# Patient Record
Sex: MALE | Race: BLACK OR AFRICAN AMERICAN | Employment: FULL TIME | URBAN - METROPOLITAN AREA
[De-identification: names, ages, dates, MRNs, and addresses within clinical notes are randomized per-mention and may not be internally consistent; named-entity substitution may affect disease eponyms.]

---

## 2017-03-16 ENCOUNTER — OFFICE VISIT (OUTPATIENT)
Dept: FAMILY MEDICINE CLINIC | Facility: CLINIC | Age: 52
End: 2017-03-16

## 2017-03-16 VITALS
HEART RATE: 73 BPM | HEIGHT: 72 IN | DIASTOLIC BLOOD PRESSURE: 80 MMHG | SYSTOLIC BLOOD PRESSURE: 135 MMHG | TEMPERATURE: 98 F | RESPIRATION RATE: 17 BRPM

## 2017-03-16 DIAGNOSIS — S06.0X0D MILD CONCUSSION, WITHOUT LOSS OF CONSCIOUSNESS, SUBSEQUENT ENCOUNTER: ICD-10-CM

## 2017-03-16 DIAGNOSIS — S16.1XXS CERVICAL STRAIN, SEQUELA: ICD-10-CM

## 2017-03-16 DIAGNOSIS — M99.01 CERVICOTHORACIC SOMATIC DYSFUNCTION: ICD-10-CM

## 2017-03-16 DIAGNOSIS — S13.4XXD WHIPLASH INJURY SYNDROME, SUBSEQUENT ENCOUNTER: Primary | ICD-10-CM

## 2017-03-16 PROCEDURE — 98927 OSTEOPATH MANJ 5-6 REGIONS: CPT | Performed by: FAMILY MEDICINE

## 2017-03-16 PROCEDURE — 99213 OFFICE O/P EST LOW 20 MIN: CPT | Performed by: FAMILY MEDICINE

## 2017-03-23 ENCOUNTER — OFFICE VISIT (OUTPATIENT)
Dept: FAMILY MEDICINE CLINIC | Facility: CLINIC | Age: 52
End: 2017-03-23

## 2017-03-23 VITALS
HEIGHT: 72 IN | SYSTOLIC BLOOD PRESSURE: 116 MMHG | RESPIRATION RATE: 16 BRPM | BODY MASS INDEX: 29.53 KG/M2 | WEIGHT: 218 LBS | HEART RATE: 81 BPM | DIASTOLIC BLOOD PRESSURE: 68 MMHG

## 2017-03-23 DIAGNOSIS — M99.01 CERVICOTHORACIC SOMATIC DYSFUNCTION: Primary | ICD-10-CM

## 2017-03-23 DIAGNOSIS — S13.4XXD WHIPLASH INJURY SYNDROME, SUBSEQUENT ENCOUNTER: ICD-10-CM

## 2017-03-23 PROCEDURE — 99214 OFFICE O/P EST MOD 30 MIN: CPT | Performed by: FAMILY MEDICINE

## 2017-03-23 PROCEDURE — 98927 OSTEOPATH MANJ 5-6 REGIONS: CPT | Performed by: FAMILY MEDICINE
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
## 2017-03-31 ENCOUNTER — OFFICE VISIT (OUTPATIENT)
Dept: PHYSICAL THERAPY | Facility: HOSPITAL | Age: 52
End: 2017-03-31
Attending: FAMILY MEDICINE
Payer: COMMERCIAL

## 2017-03-31 DIAGNOSIS — S13.4XXD WHIPLASH INJURY SYNDROME, SUBSEQUENT ENCOUNTER: ICD-10-CM

## 2017-03-31 DIAGNOSIS — M99.01 CERVICOTHORACIC SOMATIC DYSFUNCTION: Primary | ICD-10-CM

## 2017-03-31 PROCEDURE — 97163 PT EVAL HIGH COMPLEX 45 MIN: CPT
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
## 2017-04-03 ENCOUNTER — OFFICE VISIT (OUTPATIENT)
Dept: PHYSICAL THERAPY | Facility: HOSPITAL | Age: 52
End: 2017-04-03
Attending: FAMILY MEDICINE
Payer: COMMERCIAL

## 2017-04-03 PROCEDURE — 97110 THERAPEUTIC EXERCISES: CPT
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
## 2017-04-06 ENCOUNTER — APPOINTMENT (OUTPATIENT)
Dept: PHYSICAL THERAPY | Facility: HOSPITAL | Age: 52
End: 2017-04-06
Attending: FAMILY MEDICINE
Payer: COMMERCIAL

## 2017-04-11 ENCOUNTER — APPOINTMENT (OUTPATIENT)
Dept: PHYSICAL THERAPY | Facility: HOSPITAL | Age: 52
End: 2017-04-11
Attending: FAMILY MEDICINE
Payer: COMMERCIAL

## 2017-04-14 ENCOUNTER — APPOINTMENT (OUTPATIENT)
Dept: PHYSICAL THERAPY | Facility: HOSPITAL | Age: 52
End: 2017-04-14
Attending: FAMILY MEDICINE
Payer: COMMERCIAL

## 2017-04-18 ENCOUNTER — APPOINTMENT (OUTPATIENT)
Dept: PHYSICAL THERAPY | Facility: HOSPITAL | Age: 52
End: 2017-04-18
Attending: FAMILY MEDICINE
Payer: COMMERCIAL

## 2017-04-21 ENCOUNTER — OFFICE VISIT (OUTPATIENT)
Dept: PHYSICAL THERAPY | Facility: HOSPITAL | Age: 52
End: 2017-04-21
Attending: FAMILY MEDICINE
Payer: COMMERCIAL

## 2017-04-21 PROCEDURE — 97110 THERAPEUTIC EXERCISES: CPT
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
## 2017-07-20 ENCOUNTER — OFFICE VISIT (OUTPATIENT)
Dept: FAMILY MEDICINE CLINIC | Facility: CLINIC | Age: 52
End: 2017-07-20

## 2017-07-20 VITALS
RESPIRATION RATE: 16 BRPM | TEMPERATURE: 97 F | WEIGHT: 225 LBS | HEIGHT: 72 IN | HEART RATE: 67 BPM | BODY MASS INDEX: 30.48 KG/M2 | SYSTOLIC BLOOD PRESSURE: 110 MMHG | DIASTOLIC BLOOD PRESSURE: 70 MMHG

## 2017-07-20 DIAGNOSIS — Z02.89 EXAMINATION, PHYSICAL, EMPLOYEE: Primary | ICD-10-CM

## 2017-07-20 PROBLEM — Z00.00 PHYSICAL EXAM: Status: ACTIVE | Noted: 2017-07-20

## 2017-07-20 PROCEDURE — 99213 OFFICE O/P EST LOW 20 MIN: CPT | Performed by: FAMILY MEDICINE

## 2017-07-20 PROCEDURE — 99212 OFFICE O/P EST SF 10 MIN: CPT | Performed by: FAMILY MEDICINE

## 2017-07-20 NOTE — PROGRESS NOTES
HPI:    Patient ID: Delma Banks is a 49year old male. 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 No acute or chronic complaints. Bowel, bladder functions intact.          Review of Systems         Current Outpat heard. Carotid bruit not present. Pulmonary/Chest: Effort normal and breath sounds normal. No respiratory distress. Abdominal: Soft. Bowel sounds are normal.   Neurological: He is alert and oriented to person, place, and time.  No cranial nerve deficit, s

## 2017-08-14 ENCOUNTER — TELEPHONE (OUTPATIENT)
Dept: FAMILY MEDICINE CLINIC | Facility: CLINIC | Age: 52
End: 2017-08-14

## 2018-01-19 ENCOUNTER — OFFICE VISIT (OUTPATIENT)
Dept: FAMILY MEDICINE CLINIC | Facility: CLINIC | Age: 53
End: 2018-01-19

## 2018-01-19 VITALS
HEIGHT: 72 IN | DIASTOLIC BLOOD PRESSURE: 76 MMHG | HEART RATE: 78 BPM | TEMPERATURE: 99 F | SYSTOLIC BLOOD PRESSURE: 118 MMHG | RESPIRATION RATE: 16 BRPM

## 2018-01-19 DIAGNOSIS — L28.0 LICHEN SIMPLEX CHRONICUS: Primary | ICD-10-CM

## 2018-01-19 PROCEDURE — 99213 OFFICE O/P EST LOW 20 MIN: CPT | Performed by: FAMILY MEDICINE

## 2018-01-19 PROCEDURE — 99212 OFFICE O/P EST SF 10 MIN: CPT | Performed by: FAMILY MEDICINE

## 2018-01-19 NOTE — PROGRESS NOTES
HPI:    Patient ID: Ashish Green is a 47year old male. Rash   This is a new problem. The current episode started 1 to 4 weeks ago (2 weeks). The problem has been gradually worsening since onset.  The affected locations include the right hand, right Physical Exam    Constitutional: He appears well-developed and well-nourished. No distress. Cardiovascular: Normal rate and regular rhythm.     Pulmonary/Chest: Effort normal and breath sounds normal.   Skin:        Lichen simplex lesions as depicted on b

## 2018-07-26 ENCOUNTER — OFFICE VISIT (OUTPATIENT)
Dept: FAMILY MEDICINE CLINIC | Facility: CLINIC | Age: 53
End: 2018-07-26

## 2018-07-26 VITALS
SYSTOLIC BLOOD PRESSURE: 115 MMHG | HEART RATE: 64 BPM | RESPIRATION RATE: 17 BRPM | TEMPERATURE: 97 F | DIASTOLIC BLOOD PRESSURE: 78 MMHG | WEIGHT: 220 LBS | HEIGHT: 72 IN | BODY MASS INDEX: 29.8 KG/M2

## 2018-07-26 DIAGNOSIS — Z02.89 PHYSICAL EXAMINATION OF EMPLOYEE: Primary | ICD-10-CM

## 2018-07-26 PROCEDURE — 99212 OFFICE O/P EST SF 10 MIN: CPT | Performed by: FAMILY MEDICINE

## 2018-07-26 PROCEDURE — 99213 OFFICE O/P EST LOW 20 MIN: CPT | Performed by: FAMILY MEDICINE

## 2018-07-26 NOTE — PATIENT INSTRUCTIONS
Encouraged physical fitness and daily physical activity daily (PLAY). Recommend weight loss via daily exercising and consistent healthy dietary changes. Monitor blood pressures and record at home. Limit salt intake.

## 2018-07-26 NOTE — PROGRESS NOTES
HPI:    Patient ID: Verner Carpenter is a 47year old male. 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 No acute concerns. All pre-existing medical issues are all stable.          Review of Systems         Current Outpatient Pres Cardiovascular: Normal rate, regular rhythm and normal heart sounds. Pulmonary/Chest: Effort normal and breath sounds normal. No respiratory distress. Abdominal: Soft. Bowel sounds are normal. He exhibits no distension.    Neurological: He is alert a

## 2018-08-13 ENCOUNTER — TELEPHONE (OUTPATIENT)
Dept: FAMILY MEDICINE CLINIC | Facility: CLINIC | Age: 53
End: 2018-08-13

## 2018-08-13 ENCOUNTER — OFFICE VISIT (OUTPATIENT)
Dept: FAMILY MEDICINE CLINIC | Facility: CLINIC | Age: 53
End: 2018-08-13

## 2018-08-13 VITALS
RESPIRATION RATE: 17 BRPM | SYSTOLIC BLOOD PRESSURE: 117 MMHG | TEMPERATURE: 97 F | HEIGHT: 72 IN | HEART RATE: 63 BPM | DIASTOLIC BLOOD PRESSURE: 74 MMHG

## 2018-08-13 DIAGNOSIS — T14.8XXA SPLINTER: ICD-10-CM

## 2018-08-13 DIAGNOSIS — Z86.711 PERSONAL HISTORY OF PE (PULMONARY EMBOLISM): Primary | ICD-10-CM

## 2018-08-13 PROCEDURE — 99212 OFFICE O/P EST SF 10 MIN: CPT | Performed by: FAMILY MEDICINE

## 2018-08-13 PROCEDURE — 99213 OFFICE O/P EST LOW 20 MIN: CPT | Performed by: FAMILY MEDICINE

## 2018-08-13 RX ORDER — POLYMYXIN B SULFATE AND TRIMETHOPRIM 1; 10000 MG/ML; [USP'U]/ML
SOLUTION OPHTHALMIC
Refills: 0 | COMMUNITY
Start: 2018-07-08 | End: 2018-08-13 | Stop reason: ALTCHOICE

## 2018-08-13 NOTE — TELEPHONE ENCOUNTER
Tavo Linda had an appointment today with Dr. Zion Acevedo, he left FMLA forms for completion. FMLA forms, signed HIPAA form and payment form emailed to [email or web address], original forms left in the [employer] office for [employer]. Tavo Linda would like to  the forms when completed [facility].

## 2018-08-13 NOTE — PROGRESS NOTES
HPI:    Patient ID: Derrill Cabot is a 47year old male. 47year old AA male also with splint in left hand third digit which needs removal.  FMLA needs to be updated regarding pulmonary embolism history. Asymptomatic today.           Review of System Cardiovascular: Normal rate, regular rhythm and normal heart sounds. Pulmonary/Chest: Effort normal and breath sounds normal. No respiratory distress.    Musculoskeletal:        Hands:  Wood splint as depicted   Neurological: He is alert and oriented to

## 2018-08-13 NOTE — PATIENT INSTRUCTIONS
FMLA to be updated. Status unchanged. Keep specialty appointment as needed. Splinter removed manually after sterile prep with 11 blade without anesthesia.

## 2018-08-14 NOTE — TELEPHONE ENCOUNTER
Form completed signed by Dr. Ryan Blandon. Orig ready to  at  OP, pt informed and already faxed to  BROOK TAFOYA

## 2019-01-25 ENCOUNTER — TELEPHONE (OUTPATIENT)
Dept: OTHER | Age: 54
End: 2019-01-25

## 2019-01-25 NOTE — TELEPHONE ENCOUNTER
Action Requested: Summary for Provider     []  Critical Lab, Recommendations Needed  [] Need Additional Advice  []   FYI    []   Need Orders  [] Need Medications Sent to Pharmacy  []  Other     SUMMARY: OV scheduled for 1/25/19 lower back pain    Pt states

## 2019-01-29 ENCOUNTER — OFFICE VISIT (OUTPATIENT)
Dept: FAMILY MEDICINE CLINIC | Facility: CLINIC | Age: 54
End: 2019-01-29

## 2019-01-29 VITALS
HEART RATE: 60 BPM | WEIGHT: 240 LBS | RESPIRATION RATE: 16 BRPM | HEIGHT: 72 IN | BODY MASS INDEX: 32.51 KG/M2 | SYSTOLIC BLOOD PRESSURE: 120 MMHG | DIASTOLIC BLOOD PRESSURE: 81 MMHG

## 2019-01-29 DIAGNOSIS — M99.03 SOMATIC DYSFUNCTION OF LUMBOSACRAL REGION: ICD-10-CM

## 2019-01-29 DIAGNOSIS — R06.83 SNORING: ICD-10-CM

## 2019-01-29 DIAGNOSIS — M99.02 THORACIC REGION SOMATIC DYSFUNCTION: Primary | ICD-10-CM

## 2019-01-29 DIAGNOSIS — R29.818 SUSPECTED SLEEP APNEA: ICD-10-CM

## 2019-01-29 PROCEDURE — 98926 OSTEOPATH MANJ 3-4 REGIONS: CPT | Performed by: FAMILY MEDICINE

## 2019-01-29 PROCEDURE — 99212 OFFICE O/P EST SF 10 MIN: CPT | Performed by: FAMILY MEDICINE

## 2019-01-29 PROCEDURE — 99214 OFFICE O/P EST MOD 30 MIN: CPT | Performed by: FAMILY MEDICINE

## 2019-01-29 NOTE — PATIENT INSTRUCTIONS
Sleep study recommended for the patient. OMT done. Monitor blood pressures and record at home. Limit salt intake. Recommend weight loss via daily exercising and consistent healthy dietary changes.   We will get a lumbar x-ray if conservative manipulation

## 2019-01-29 NOTE — PROGRESS NOTES
HPI:    Patient ID: Lauren Mccollum is a 47year old male. Low Back Pain   This is a new problem. The current episode started 1 to 4 weeks ago. The problem occurs constantly. The problem is unchanged. The pain is present in the lumbar spine.  The qualit soft palate hanging low and obstructive. Cardiovascular: Normal rate, regular rhythm and normal heart sounds. Pulmonary/Chest: Effort normal and breath sounds normal. No respiratory distress.    Musculoskeletal:        Cervical back: He exhibits decr

## 2019-01-29 NOTE — PROCEDURES
Multiple vertebral segments in lumbosacral and thoracic region misaligned. Manual osteopathic manipulative therapy performed and immediate relief obtained. Patient instantaneously improved.

## 2019-03-28 ENCOUNTER — TELEPHONE (OUTPATIENT)
Dept: GASTROENTEROLOGY | Facility: CLINIC | Age: 54
End: 2019-03-28

## 2019-03-28 NOTE — TELEPHONE ENCOUNTER
----- Message from Thierno Valle RN sent at 4/29/2016  4:52 PM CDT -----  Regarding: recall colon  Recall colon in 3 years per PL.  Colon done 4/28/16

## 2019-04-24 ENCOUNTER — NURSE TRIAGE (OUTPATIENT)
Dept: FAMILY MEDICINE CLINIC | Facility: CLINIC | Age: 54
End: 2019-04-24

## 2019-04-24 NOTE — TELEPHONE ENCOUNTER
Pt called in requesting to come in after 1pm for off and on knee pain. Offered pt the first available appt with [facility] for 5/6 pt declined. Pt declines seeing another physician sooner.    Please advise

## 2019-04-26 NOTE — TELEPHONE ENCOUNTER
Action Requested: Summary for Provider     []  Critical Lab, Recommendations Needed  [] Need Additional Advice  []   FYI    []   Need Orders  [] Need Medications Sent to Pharmacy  []  Other     SUMMARY:   Appointment made for tomorrow 4/26/19    The patien

## 2019-05-14 ENCOUNTER — LAB ENCOUNTER (OUTPATIENT)
Dept: LAB | Age: 54
End: 2019-05-14
Attending: FAMILY MEDICINE
Payer: COMMERCIAL

## 2019-05-14 ENCOUNTER — OFFICE VISIT (OUTPATIENT)
Dept: FAMILY MEDICINE CLINIC | Facility: CLINIC | Age: 54
End: 2019-05-14

## 2019-05-14 VITALS
SYSTOLIC BLOOD PRESSURE: 117 MMHG | BODY MASS INDEX: 31.83 KG/M2 | HEIGHT: 72 IN | DIASTOLIC BLOOD PRESSURE: 75 MMHG | WEIGHT: 235 LBS | RESPIRATION RATE: 16 BRPM | HEART RATE: 76 BPM | TEMPERATURE: 98 F

## 2019-05-14 DIAGNOSIS — Z13.220 LIPID SCREENING: ICD-10-CM

## 2019-05-14 DIAGNOSIS — Z12.11 COLON CANCER SCREENING: ICD-10-CM

## 2019-05-14 DIAGNOSIS — Z00.00 ROUTINE PHYSICAL EXAMINATION: ICD-10-CM

## 2019-05-14 DIAGNOSIS — Z12.5 PROSTATE CANCER SCREENING: ICD-10-CM

## 2019-05-14 DIAGNOSIS — Z00.00 ROUTINE PHYSICAL EXAMINATION: Primary | ICD-10-CM

## 2019-05-14 DIAGNOSIS — Z13.29 THYROID DISORDER SCREEN: ICD-10-CM

## 2019-05-14 PROCEDURE — 99396 PREV VISIT EST AGE 40-64: CPT | Performed by: FAMILY MEDICINE

## 2019-05-14 PROCEDURE — 36415 COLL VENOUS BLD VENIPUNCTURE: CPT

## 2019-05-14 PROCEDURE — 81003 URINALYSIS AUTO W/O SCOPE: CPT

## 2019-05-14 PROCEDURE — 80053 COMPREHEN METABOLIC PANEL: CPT

## 2019-05-14 PROCEDURE — 85025 COMPLETE CBC W/AUTO DIFF WBC: CPT

## 2019-05-14 PROCEDURE — 93005 ELECTROCARDIOGRAM TRACING: CPT | Performed by: FAMILY MEDICINE

## 2019-05-14 PROCEDURE — 80061 LIPID PANEL: CPT

## 2019-05-14 PROCEDURE — 93000 ELECTROCARDIOGRAM COMPLETE: CPT | Performed by: FAMILY MEDICINE

## 2019-05-14 PROCEDURE — 84443 ASSAY THYROID STIM HORMONE: CPT

## 2019-05-14 NOTE — PROGRESS NOTES
HPI:    Patient ID: Verner Carpenter is a 47year old male.     Patient is a 56-year-old -American male here for complete preventive care physical and for status update on any confirmed chronic medical illnesses and follow up on any previous labs or sinus rate and rhythm. No Q waves. No ST-T wave changes. Normal EKG. - COMP METABOLIC PANEL (14); Future    2. Lipid screening  Screened  - LIPID PANEL; Future    3. Thyroid disorder screen  Screened  - TSH W REFLEX TO FREE T4; Future    4.  Prostate canc

## 2019-05-14 NOTE — PATIENT INSTRUCTIONS
All adult screening ordered and done appropriate for patient's age and gender and risk factors and complaints. Recommend weight loss via daily exercising and consistent healthy dietary changes. Monitor blood pressures and record at home.  Limit salt intak

## 2019-05-16 ENCOUNTER — OFFICE VISIT (OUTPATIENT)
Dept: FAMILY MEDICINE CLINIC | Facility: CLINIC | Age: 54
End: 2019-05-16

## 2019-05-16 VITALS
TEMPERATURE: 98 F | HEART RATE: 81 BPM | DIASTOLIC BLOOD PRESSURE: 79 MMHG | RESPIRATION RATE: 16 BRPM | SYSTOLIC BLOOD PRESSURE: 112 MMHG

## 2019-05-16 DIAGNOSIS — Z57.9 OCCUPATIONAL EXPOSURE IN WORKPLACE: Primary | ICD-10-CM

## 2019-05-16 PROCEDURE — 99213 OFFICE O/P EST LOW 20 MIN: CPT | Performed by: FAMILY MEDICINE

## 2019-05-16 PROCEDURE — 99212 OFFICE O/P EST SF 10 MIN: CPT | Performed by: FAMILY MEDICINE

## 2019-05-16 SDOH — HEALTH STABILITY - PHYSICAL HEALTH: OCCUPATIONAL EXPOSURE TO UNSPECIFIED RISK FACTOR: Z57.9
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
## 2019-05-20 ENCOUNTER — TELEPHONE (OUTPATIENT)
Dept: GASTROENTEROLOGY | Facility: CLINIC | Age: 54
End: 2019-05-20

## 2019-05-20 NOTE — TELEPHONE ENCOUNTER
Forwarded to Nelly TILLMAN. Pt contacted, meds/allergies reviewed. This is Dr Bro Rodriguez pt--placed colon/path report on your desk. Thanks.      Last Procedure:  Colon screening 4/28/16  Last Diagnosis:  Found 4 polyps  Recalled for (years): 3 yrs, due 4/19  Kamala

## 2019-05-20 NOTE — TELEPHONE ENCOUNTER
The patient's chart has been reviewed. Okay to schedule pt for 3 year CLN recall r/t screening for CLN CA/hx colon polyps with Dr. Byron Parmar. Advise MAC sedation due to BMI >30 with split dose Colyte or equivalent (eRx) preparation.     -Please note:  It is

## 2019-05-21 NOTE — TELEPHONE ENCOUNTER
CB, unable to LM to schedule procedure, VM box full. If pt returns call, please transfer to Gaby Pond at ext [phone] or [phone] for scheduling. Or please transfer to Maxine Real in GI if unavailable.

## 2019-05-23 ENCOUNTER — TELEPHONE (OUTPATIENT)
Dept: FAMILY MEDICINE CLINIC | Facility: CLINIC | Age: 54
End: 2019-05-23

## 2019-05-23 NOTE — TELEPHONE ENCOUNTER
Richardson Lowry would like May 14th.notes faxed over to [phone]. Claim # [id number].  Please advsed

## 2019-05-29 ENCOUNTER — OFFICE VISIT (OUTPATIENT)
Dept: SLEEP CENTER | Age: 54
End: 2019-05-29
Attending: FAMILY MEDICINE
Payer: COMMERCIAL

## 2019-05-29 DIAGNOSIS — Z76.89 SLEEP CONCERN: Primary | ICD-10-CM

## 2019-05-29 DIAGNOSIS — R29.818 SUSPECTED SLEEP APNEA: ICD-10-CM

## 2019-05-29 PROCEDURE — 95810 POLYSOM 6/> YRS 4/> PARAM: CPT

## 2019-06-01 NOTE — PROCEDURES
[address] [facility]  Accredited by the [place] of Sleep Medicine (AASM)    PATIENT'S NAME: Kwadwo Seller   ATTENDING PHYSICIAN: Tushar Garcia DO   REFERRING PHYSICIAN: Tushar Garcia DO   PATIENT ACCOUNT #: [de-identified] LOCATION: S index is 20.5 events per hour, for a combined arousal index of 35.9 events per hour.   There were 11 periodic limb movements, for a periodic limb movement index of 2 per hour, of which 1 per hour were associated with arousal.  Lowest desaturation was to 85%

## 2019-06-06 DIAGNOSIS — G47.33 OSA (OBSTRUCTIVE SLEEP APNEA): Primary | ICD-10-CM

## 2019-06-07 ENCOUNTER — OFFICE VISIT (OUTPATIENT)
Dept: FAMILY MEDICINE CLINIC | Facility: CLINIC | Age: 54
End: 2019-06-07

## 2019-06-07 VITALS
RESPIRATION RATE: 17 BRPM | DIASTOLIC BLOOD PRESSURE: 75 MMHG | HEART RATE: 75 BPM | WEIGHT: 235 LBS | SYSTOLIC BLOOD PRESSURE: 115 MMHG | BODY MASS INDEX: 31.83 KG/M2 | HEIGHT: 72 IN

## 2019-06-07 DIAGNOSIS — Z57.9 OCCUPATIONAL EXPOSURE IN WORKPLACE: Primary | ICD-10-CM

## 2019-06-07 PROCEDURE — 99212 OFFICE O/P EST SF 10 MIN: CPT | Performed by: FAMILY MEDICINE

## 2019-06-07 PROCEDURE — 99213 OFFICE O/P EST LOW 20 MIN: CPT | Performed by: FAMILY MEDICINE

## 2019-06-07 SDOH — HEALTH STABILITY - PHYSICAL HEALTH: OCCUPATIONAL EXPOSURE TO UNSPECIFIED RISK FACTOR: Z57.9

## 2019-06-07 NOTE — PATIENT INSTRUCTIONS
Return to work statement generated for patient. If this continues to be a sleep disturbing issue or any type of psychological components then we will recommend clinical psychologist or other counseling.

## 2019-06-07 NOTE — PROGRESS NOTES
HPI:    Patient ID: Anisha Lane is a 47year old male. This is a follow-up visit to the following. HPI: Dandy Lopez is a 47year old male who presents for occupational exposure. He works for [employer].  Pt was working yesterda of the defined types were placed in this encounter.       Meds This Visit:  Requested Prescriptions      No prescriptions requested or ordered in this encounter       Imaging & Referrals:  None  Patient Instructions   Return to work statement generated for

## 2019-06-12 ENCOUNTER — TELEPHONE (OUTPATIENT)
Dept: FAMILY MEDICINE CLINIC | Facility: CLINIC | Age: 54
End: 2019-06-12

## 2019-06-12 NOTE — TELEPHONE ENCOUNTER
Irish rucker [facility] workers comp department called stating they need LOV notes and return to work note for June 7th 2019

## 2019-07-01 ENCOUNTER — TELEPHONE (OUTPATIENT)
Dept: FAMILY MEDICINE CLINIC | Facility: CLINIC | Age: 54
End: 2019-07-01

## 2019-07-01 NOTE — TELEPHONE ENCOUNTER
Milana Champagne stopped in the [facility] office, dropped off an Attending Physician Statement to be completed. Form, signed HIPAA form and FCR emailed to [email or web address], original form left in the [facility] office for completion.  Please call patient when the

## 2019-07-02 NOTE — TELEPHONE ENCOUNTER
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 Logged for processing.  NK

## 2019-07-08 NOTE — TELEPHONE ENCOUNTER
[facility] and [facility]hands[facility] have been completed and are ready for pk up at [employer]  for pt to take to employer. Pt given office hours. No further action needed.

## 2019-08-01 ENCOUNTER — NURSE TRIAGE (OUTPATIENT)
Dept: FAMILY MEDICINE CLINIC | Facility: CLINIC | Age: 54
End: 2019-08-01

## 2019-08-01 NOTE — TELEPHONE ENCOUNTER
Action Requested: Summary for Provider     []  Critical Lab, Recommendations Needed  [] Need Additional Advice  [x]   FYI    []   Need Orders  [] Need Medications Sent to Pharmacy  []  Other     SUMMARY: Appt scheduled 8/19/19 6pm with Dr Devyn De Paz for symptoms

## 2019-08-02 ENCOUNTER — TELEPHONE (OUTPATIENT)
Dept: FAMILY MEDICINE CLINIC | Facility: CLINIC | Age: 54
End: 2019-08-02

## 2019-08-02 NOTE — TELEPHONE ENCOUNTER
Pt dropped off FMLA forms to be completed and pt will pk up when completed. Pt HIPAA still current, pt to be billed for forms fee.  Forms scanned to [employer] and placed in [employer] forms mailbox

## 2019-08-06 NOTE — TELEPHONE ENCOUNTER
Called and informed patient that [facility]) is ready for pk up at the [employer] .

## 2019-08-19 ENCOUNTER — OFFICE VISIT (OUTPATIENT)
Dept: FAMILY MEDICINE CLINIC | Facility: CLINIC | Age: 54
End: 2019-08-19

## 2019-08-19 VITALS
HEIGHT: 72 IN | HEART RATE: 77 BPM | RESPIRATION RATE: 17 BRPM | WEIGHT: 222 LBS | SYSTOLIC BLOOD PRESSURE: 125 MMHG | BODY MASS INDEX: 30.07 KG/M2 | TEMPERATURE: 98 F | DIASTOLIC BLOOD PRESSURE: 72 MMHG

## 2019-08-19 DIAGNOSIS — M99.02 THORACIC REGION SOMATIC DYSFUNCTION: ICD-10-CM

## 2019-08-19 DIAGNOSIS — Z12.11 COLON CANCER SCREENING: Primary | ICD-10-CM

## 2019-08-19 DIAGNOSIS — R19.4 CHANGE IN BOWEL HABIT: ICD-10-CM

## 2019-08-19 DIAGNOSIS — M99.03 SOMATIC DYSFUNCTION OF LUMBOSACRAL REGION: ICD-10-CM

## 2019-08-19 DIAGNOSIS — M54.50 ACUTE LEFT-SIDED LOW BACK PAIN WITHOUT SCIATICA: ICD-10-CM

## 2019-08-19 LAB
BACTERIA UR QL AUTO: NEGATIVE /HPF
BILIRUB UR QL: NEGATIVE
CLARITY UR: CLEAR
COLOR UR: YELLOW
GLUCOSE UR-MCNC: NEGATIVE MG/DL
HGB UR QL STRIP.AUTO: NEGATIVE
LEUKOCYTE ESTERASE UR QL STRIP.AUTO: NEGATIVE
NITRITE UR QL STRIP.AUTO: NEGATIVE
PH UR: 6 [PH] (ref 5–8)
PROT UR-MCNC: 30 MG/DL
RBC #/AREA URNS AUTO: <1 /HPF
SP GR UR STRIP: 1.03 (ref 1–1.03)
UROBILINOGEN UR STRIP-ACNC: 2
VIT C UR-MCNC: 40 MG/DL
WBC #/AREA URNS AUTO: <1 /HPF

## 2019-08-19 PROCEDURE — 99214 OFFICE O/P EST MOD 30 MIN: CPT | Performed by: FAMILY MEDICINE

## 2019-08-19 PROCEDURE — 98926 OSTEOPATH MANJ 3-4 REGIONS: CPT | Performed by: FAMILY MEDICINE

## 2019-08-19 RX ORDER — CYCLOBENZAPRINE HCL 5 MG
5 TABLET ORAL 3 TIMES DAILY PRN
Qty: 30 TABLET | Refills: 0 | Status: SHIPPED | OUTPATIENT
Start: 2019-08-19 | End: 2021-01-05

## 2019-08-19 NOTE — PATIENT INSTRUCTIONS
Patient referred to gastroenterologist for colonoscopy repeat. Continue oral pool therapy. Urine studies sent. Cyclobenzaprine 5 mg prescribed to be taken before bedtime for muscle relaxation as needed. OMT done.

## 2019-08-20 NOTE — PROGRESS NOTES
HPI:    Patient ID: Baudilio Feldman is a 47year old male. Patient is a 13-year-old -American male here with a concern about 2 weeks of low back pain focused more to the left side without any trauma to report.   The patient has been doing a lot o rate, regular rhythm and normal heart sounds. Pulmonary/Chest: Effort normal and breath sounds normal.   Abdominal: Soft. Bowel sounds are normal.   Neurological: He is alert and oriented to person, place, and time. ASSESSMENT/PLAN:   1.  Ac

## 2019-08-28 ENCOUNTER — TELEPHONE (OUTPATIENT)
Dept: OTHER | Age: 54
End: 2019-08-28

## 2019-08-28 NOTE — TELEPHONE ENCOUNTER
Advised patient of the result per Dr Peacock Sessions note: Urinalysis has some findings but non suggestive of UTI. Amira Richter Patient is asking if he should be concerned with the \"some findings\". Patient advised he drinks muscle protein milk shakes but not daily.  Just

## 2019-09-16 NOTE — H&P
[address] [facility]    Patient presen use:   mg daily    History, Medications, Allergies, ROS:      Past Medical History:   Diagnosis Date   • Sleep apnea, obstructive 09/2019    mild      Past Surgical History:   Procedure Laterality Date   • COLONOSCOPY     • SHOULDER SURG PROC Rosamond Barthel in no acute distress  HEENT: conjunctiva pink, the sclera appears anicteric, oropharynx clear, mucus membranes appear moist  CV: regular rate and rhythm, the extremities are warm and well perfused   Lung: effort normal and breath sounds normal, no respirat before procedure(s)   - NO herbal supplements or weight loss medications x 7 days prior to the procedure(s)    ** If MAC @ [facility] or IV twilight - continue all medications as prescribed      Colonoscopy consent: I have discussed the risks (including risk of d

## 2019-09-23 ENCOUNTER — OFFICE VISIT (OUTPATIENT)
Dept: GASTROENTEROLOGY | Facility: CLINIC | Age: 54
End: 2019-09-23

## 2019-09-23 ENCOUNTER — TELEPHONE (OUTPATIENT)
Dept: GASTROENTEROLOGY | Facility: CLINIC | Age: 54
End: 2019-09-23

## 2019-09-23 VITALS
BODY MASS INDEX: 29.66 KG/M2 | HEART RATE: 66 BPM | HEIGHT: 72 IN | DIASTOLIC BLOOD PRESSURE: 72 MMHG | WEIGHT: 219 LBS | SYSTOLIC BLOOD PRESSURE: 111 MMHG

## 2019-09-23 DIAGNOSIS — Z86.010 HISTORY OF COLON POLYPS: ICD-10-CM

## 2019-09-23 DIAGNOSIS — Z80.0 FAMILY HISTORY OF COLON CANCER: ICD-10-CM

## 2019-09-23 DIAGNOSIS — Z86.010 HISTORY OF COLON POLYPS: Primary | ICD-10-CM

## 2019-09-23 DIAGNOSIS — Z12.11 SCREENING FOR COLON CANCER: Primary | ICD-10-CM

## 2019-09-23 PROCEDURE — 99243 OFF/OP CNSLTJ NEW/EST LOW 30: CPT | Performed by: NURSE PRACTITIONER

## 2019-09-23 NOTE — TELEPHONE ENCOUNTER
Scheduled for:  Colonoscopy [id number]  Provider Name: Giuseppe Iqbal  Date:  11/20/19  Location: [facility]   Sedation:  Mac  Time:  1400 / Arrival 1300Pm  Prep: Split dose Colyte  Meds/Allergies Reconciled?:  Yes,Physican reviewed by Alfreda Otto  Diagnosis with cod

## 2019-09-23 NOTE — PATIENT INSTRUCTIONS
-Schedule colonoscopy w/ Dr. Chuck Rush with MAC  Dx:  Screening, hx colon polyps, FHCC   -Eligible for NE: No r/t medical history  -Prep: Split dose Colyte or equivalent  -Anti-platelets and anti-coagulants: ASA-continue as prescribed  -Diabetes meds: None    *

## 2019-10-02 ENCOUNTER — TELEPHONE (OUTPATIENT)
Dept: GASTROENTEROLOGY | Facility: CLINIC | Age: 54
End: 2019-10-02

## 2019-10-02 NOTE — TELEPHONE ENCOUNTER
Charito in [facility] was not in office. Sending back to [facility] to review Dr Caruso People note below so it can be forwarded to [facility]. Thanks. May close encounter when auth received and pt notified. Thanks.

## 2019-10-02 NOTE — TELEPHONE ENCOUNTER
Pt's ins is requesting a reason for a 3 yr recall regarding the colonoscopy. Per pt's ins, both family hx & hx of 2 colon polyps is recommended in 5 yrs. Please advise. Thank you.

## 2019-10-02 NOTE — TELEPHONE ENCOUNTER
Dr Larisa Leger, please see below message from Ean Armendariz, and refer to your 4/28/16 colonoscopy report/path. Please advise, thanks.

## 2019-10-02 NOTE — TELEPHONE ENCOUNTER
Chart reviewed - prior colon from 2016 mentions 4 colon polyps removed. Adenomatous x 1  Two were not retrieved and assumed to be adenomatous   One hyperplastic. Guidelines suggest that 3 or greater polyps would indicate 3 year recall.

## 2019-11-19 ENCOUNTER — TELEPHONE (OUTPATIENT)
Dept: GASTROENTEROLOGY | Facility: CLINIC | Age: 54
End: 2019-11-19

## 2019-11-19 NOTE — TELEPHONE ENCOUNTER
Pt was contacted to inquire about his question. Per patient when he called he thought he lost his prep instructions but eventually found it. RN asked if he had any questions regarding the instruction and he stated no.   States he just picked up his pr

## 2019-11-20 ENCOUNTER — ANESTHESIA EVENT (OUTPATIENT)
Dept: ENDOSCOPY | Facility: HOSPITAL | Age: 54
End: 2019-11-20
Payer: COMMERCIAL

## 2019-11-20 ENCOUNTER — HOSPITAL ENCOUNTER (OUTPATIENT)
Facility: HOSPITAL | Age: 54
Setting detail: HOSPITAL OUTPATIENT SURGERY
Discharge: HOME | End: 2019-11-20
Attending: INTERNAL MEDICINE | Admitting: INTERNAL MEDICINE
Payer: COMMERCIAL

## 2019-11-20 ENCOUNTER — ANESTHESIA (OUTPATIENT)
Dept: ENDOSCOPY | Facility: HOSPITAL | Age: 54
End: 2019-11-20
Payer: COMMERCIAL

## 2019-11-20 VITALS
SYSTOLIC BLOOD PRESSURE: 113 MMHG | HEART RATE: 62 BPM | RESPIRATION RATE: 16 BRPM | WEIGHT: 220 LBS | OXYGEN SATURATION: 100 % | BODY MASS INDEX: 29.8 KG/M2 | DIASTOLIC BLOOD PRESSURE: 87 MMHG | HEIGHT: 72 IN

## 2019-11-20 DIAGNOSIS — K63.5 POLYP OF DESCENDING COLON, UNSPECIFIED TYPE: Primary | ICD-10-CM

## 2019-11-20 DIAGNOSIS — Z86.010 HISTORY OF COLON POLYPS: ICD-10-CM

## 2019-11-20 DIAGNOSIS — Z80.0 FAMILY HISTORY OF COLON CANCER: ICD-10-CM

## 2019-11-20 DIAGNOSIS — K64.9 HEMORRHOID: ICD-10-CM

## 2019-11-20 DIAGNOSIS — K63.5 POLYP OF SIGMOID COLON, UNSPECIFIED TYPE: ICD-10-CM

## 2019-11-20 PROCEDURE — 0DBN8ZX EXCISION OF SIGMOID COLON, VIA NATURAL OR ARTIFICIAL OPENING ENDOSCOPIC, DIAGNOSTIC: ICD-10-PCS | Performed by: INTERNAL MEDICINE

## 2019-11-20 PROCEDURE — 0DBM8ZX EXCISION OF DESCENDING COLON, VIA NATURAL OR ARTIFICIAL OPENING ENDOSCOPIC, DIAGNOSTIC: ICD-10-PCS | Performed by: INTERNAL MEDICINE

## 2019-11-20 PROCEDURE — 45385 COLONOSCOPY W/LESION REMOVAL: CPT | Performed by: INTERNAL MEDICINE

## 2019-11-20 RX ORDER — SODIUM CHLORIDE, SODIUM LACTATE, POTASSIUM CHLORIDE, CALCIUM CHLORIDE 600; 310; 30; 20 MG/100ML; MG/100ML; MG/100ML; MG/100ML
INJECTION, SOLUTION INTRAVENOUS CONTINUOUS
Status: CANCELLED | OUTPATIENT
Start: 2019-11-20

## 2019-11-20 RX ORDER — LIDOCAINE HYDROCHLORIDE 10 MG/ML
INJECTION, SOLUTION EPIDURAL; INFILTRATION; INTRACAUDAL; PERINEURAL AS NEEDED
Status: DISCONTINUED | OUTPATIENT
Start: 2019-11-20 | End: 2019-11-20 | Stop reason: SURG

## 2019-11-20 RX ORDER — NALOXONE HYDROCHLORIDE 0.4 MG/ML
80 INJECTION, SOLUTION INTRAMUSCULAR; INTRAVENOUS; SUBCUTANEOUS AS NEEDED
Status: CANCELLED | OUTPATIENT
Start: 2019-11-20 | End: 2019-11-20

## 2019-11-20 RX ORDER — SODIUM CHLORIDE, SODIUM LACTATE, POTASSIUM CHLORIDE, CALCIUM CHLORIDE 600; 310; 30; 20 MG/100ML; MG/100ML; MG/100ML; MG/100ML
INJECTION, SOLUTION INTRAVENOUS CONTINUOUS
Status: DISCONTINUED | OUTPATIENT
Start: 2019-11-20 | End: 2019-11-20

## 2019-11-20 RX ADMIN — LIDOCAINE HYDROCHLORIDE 50 MG: 10 INJECTION, SOLUTION EPIDURAL; INFILTRATION; INTRACAUDAL; PERINEURAL at 14:36:00

## 2019-11-20 NOTE — ANESTHESIA POSTPROCEDURE EVALUATION
Patient: Ashish Green    Procedure Summary     Date:  11/20/19 Room / Location:  [facility] ENDOSCOPY 05 / [facility] ENDOSCOPY    Anesthesia Start:  1319 Anesthesia Stop:  4459    Procedure:  COLONOSCOPY (N/A ) Diagnosis:       History of colon polyps      Family h

## 2019-11-20 NOTE — ANESTHESIA PREPROCEDURE EVALUATION
Anesthesia PreOp Note    HPI:     Delma Banks is a 49year old male who presents for preoperative consultation requested by: Glen Feliz MD    Date of Surgery: 11/20/2019    Procedure(s):  COLONOSCOPY  Indication: History of colon polyps, Family triamcinolone acetonide 0.1 % External Cream, Apply topically 2 (two) times daily as needed. , Disp: 60 g, Rfl: 3, Taking      lactated ringers infusion, , Intravenous, Continuous, Perri Coelho MD, Last Rate: 20 mL/hr at 11/20/19 1320    No current Epi Forced sexual activity: Not on file    Other Topics      Concerns:         Service: Not Asked        Blood Transfusions: Not Asked        Caffeine Concern: Yes          tea        Occupational Exposure: Not Asked        Hobby Hazards: Not Ask I have informed Jeanna Garcia and/or legal guardian or family member of the nature of the anesthetic plan, benefits, risks including possible dental damage if relevant, major complications, and any alternative forms of anesthetic management.    All of th

## 2019-11-20 NOTE — H&P
History & Physical Examination    Patient Name: Berna Byrd  MRN: [id number]  CSN: [id number]  YOB: 1965    Diagnosis: history of colon polyps       aspirin 81 MG Oral Tab, Take 81 mg by mouth daily. , Disp: , Rfl: , 11/17/2019  PEG 3350- above. Corinne Griffiths.  Laquita  11/20/2019  2:35 PM

## 2019-11-20 NOTE — OPERATIVE REPORT
[facility] HOSP - [facility] Endoscopy Report      Preoperative Diagnosis:  - history of colon polyps   - family history of colon cancer      Post operative Diagnosis:  - colon polyps x 2  - internal hemorrhoids      Procedure:    Colonoscopy       Surgeon:

## 2019-11-22 ENCOUNTER — TELEPHONE (OUTPATIENT)
Dept: GASTROENTEROLOGY | Facility: CLINIC | Age: 54
End: 2019-11-22

## 2019-11-26 ENCOUNTER — TELEPHONE (OUTPATIENT)
Dept: GASTROENTEROLOGY | Facility: CLINIC | Age: 54
End: 2019-11-26

## 2019-11-26 DIAGNOSIS — R10.9 ABDOMINAL DISCOMFORT: Primary | ICD-10-CM

## 2019-11-26 NOTE — TELEPHONE ENCOUNTER
Pt contacted and reviewed below recommendation with the patient. He will try simethicone as he tried pepto and was not helping. Pt was also given the phone number to central scheduling ([phone]) for the abdominal x-ray ordered.   Pt asked how lakeisha

## 2019-11-26 NOTE — TELEPHONE ENCOUNTER
Dr. Cris Feliz (office on call),    Pt calling c/o abdominal pain on the right and left side described as achy, worse with food, currently rated at 5-6/10. Also feels gassy/bloated and abd pain gets relieved when passing gas.  Last BM was yesterday with nor

## 2019-11-26 NOTE — TELEPHONE ENCOUNTER
I reviewed the procedure note which appeared uncomplicated. Could be just alteration of bowels and gas which should improve over time. I will order an Xray for him to get completed in radiology.     He can try some pepto OTC for the discomfort or simethi

## 2019-12-23 ENCOUNTER — HOSPITAL ENCOUNTER (OUTPATIENT)
Dept: GENERAL RADIOLOGY | Facility: HOSPITAL | Age: 54
Discharge: HOME | End: 2019-12-23
Attending: INTERNAL MEDICINE
Payer: COMMERCIAL

## 2019-12-23 ENCOUNTER — TELEPHONE (OUTPATIENT)
Dept: GASTROENTEROLOGY | Facility: CLINIC | Age: 54
End: 2019-12-23

## 2019-12-23 DIAGNOSIS — R10.9 ABDOMINAL DISCOMFORT: ICD-10-CM

## 2019-12-23 PROCEDURE — 74018 RADEX ABDOMEN 1 VIEW: CPT | Performed by: INTERNAL MEDICINE

## 2019-12-23 NOTE — TELEPHONE ENCOUNTER
Pt informed of below results from [facility] and [address] [place] [address] Rd appt made   Future Appointments   Date Time Provider India Colunga   1/6/2020  4:00 PM PRANAY Hensley [facility] Sathya LINARES     Patient notified to come to [address], [facility], [address] [place] [address]

## 2019-12-23 NOTE — TELEPHONE ENCOUNTER
----- Message from PRANAY Green sent at 12/23/2019  5:24 PM CST -----  Unremarkable x-ray r/t TE dated 11/26/19 noted (ordered per Dr. Karla De La Cruz - on call). Nursing: please let the pt know his x-ray shows no abnormalities.   If pt is continuing t

## 2019-12-24 NOTE — TELEPHONE ENCOUNTER
Pt's procedure was completed on 11/20/19 with Dr. Bro Rodriguez at [facility]. See TE from 9/23/19 for scheduling info. TE closed.

## 2019-12-31 NOTE — TELEPHONE ENCOUNTER
Unable to leave a message. Call does not go through on home and cell #. I tried three times. I called patient's work number and person who answered the phone said Sergio Moss is unavailable and no other information given. Please follow up on Thursday.

## 2019-12-31 NOTE — TELEPHONE ENCOUNTER
Nursing: To follow-up on this appointment. This patient is currently on my schedule for next Monday at 4 PM.  I do not typically see patients that late unless it was an add-on however I do not see documentation that I approved this patient to be added on.

## 2019-12-31 NOTE — PROGRESS NOTES
[facility] Follow-up Visit    Philomena not yet have CPAP     Pertinent Family Hx:  + FHCC, father (dx age 67)  - No family hx of esophageal, gastric cancer  - No family history of IBD.     Prior endoscopies:  November 2019 colonoscopy performed by Dr. Dara Flores with MAC related to history of colon mouth 3 (three) times daily as needed for Muscle spasms. (Patient not taking: Reported on 1/6/2020 ) 30 tablet 0   • triamcinolone acetonide 0.1 % External Cream Apply topically 2 (two) times daily as needed.  (Patient not taking: Reported on 1/6/2020 ) 61 internal hemorrhoids, PE (2009), trigeminal neuralgia, who presents for f/u        1. Epigastric burning: Physical exam unremarkable. Patient does not describe other associated symptoms as discussed with Dr. Nora Salazar.   Rather, he describes epigastric bu

## 2020-01-06 ENCOUNTER — OFFICE VISIT (OUTPATIENT)
Dept: GASTROENTEROLOGY | Facility: CLINIC | Age: 55
End: 2020-01-06

## 2020-01-06 VITALS
BODY MASS INDEX: 31.42 KG/M2 | SYSTOLIC BLOOD PRESSURE: 130 MMHG | HEIGHT: 72 IN | DIASTOLIC BLOOD PRESSURE: 70 MMHG | HEART RATE: 78 BPM | WEIGHT: 232 LBS

## 2020-01-06 DIAGNOSIS — R10.13 EPIGASTRIC BURNING SENSATION: Primary | ICD-10-CM

## 2020-01-06 PROCEDURE — 99214 OFFICE O/P EST MOD 30 MIN: CPT | Performed by: NURSE PRACTITIONER

## 2020-01-06 NOTE — TELEPHONE ENCOUNTER
Nursing: please follow up on Mari's message as below.  I do not see that we have tried to reach out to the pt since Dec. 31st

## 2020-01-06 NOTE — TELEPHONE ENCOUNTER
LMTCB    There is a 1 pm slot open today, we will try to reschedule appt for 1pm today or next available. I also gave [employer] to  they will continue to call the patient as well.      [address]- FYI

## 2020-01-09 ENCOUNTER — APPOINTMENT (OUTPATIENT)
Dept: LAB | Facility: HOSPITAL | Age: 55
End: 2020-01-09
Attending: NURSE PRACTITIONER
Payer: COMMERCIAL

## 2020-01-09 DIAGNOSIS — R10.13 EPIGASTRIC BURNING SENSATION: ICD-10-CM

## 2020-01-09 PROCEDURE — 87338 HPYLORI STOOL AG IA: CPT

## 2020-01-14 LAB — H PYLORI AG STL QL IA: NEGATIVE

## 2020-01-14 RX ORDER — PANTOPRAZOLE SODIUM 40 MG/1
40 TABLET, DELAYED RELEASE ORAL
Qty: 30 TABLET | Refills: 3 | Status: SHIPPED | OUTPATIENT
Start: 2020-01-14 | End: 2020-02-13

## 2020-02-03 NOTE — Clinical Note
Spoke with pt today for TCM--please see notes.TCM appt made for 5/14/2024 with you. Pt prefers to also keep 6/04/2024 3 month f/u appt with PCP. Thank you.  Future Appointments 5/13/2024  2:00 PM    Berna Pierre APRN [facility] 5/14/2024  3:30 PM    Mandy Rodríguez APRN      [facility] 6/4/2024   2:40 PM    Dawson Means DO    [facility] 11/21/2024 10:40 AM   Dawson Means DO    [facility]  Subjective   Rigoberto Parks is a 4 m.o. male.       Well Child Visit 4 months     The following portions of the patient's history were reviewed and updated as appropriate: allergies, current medications, past family history, past medical history, past social history, past surgical history and problem list.    Review of Systems   Constitutional: Negative for appetite change and fever.   HENT: Negative for congestion, rhinorrhea, sneezing, swollen glands and trouble swallowing.    Eyes: Negative for discharge and redness.   Respiratory: Negative for cough, choking and wheezing.    Cardiovascular: Negative for fatigue with feeds and cyanosis.   Gastrointestinal: Negative for abdominal distention, blood in stool, constipation, diarrhea and vomiting.   Genitourinary: Negative for decreased urine volume and hematuria.   Skin: Negative for color change and rash.   Hematological: Negative for adenopathy.       Current Issues:  Current concerns include none.    Review of Nutrition:  Current diet: formula sim spit up  Difficulties with feeding? no  Current stooling frequency: 1-2 times a day  Sleeping all night: yes    Social Screening:  Secondhand smoke exposure? no   Car Seat (backwards, back seat) yes  Sleeps on back / side yes  Smoke Detectors yes    Developmental History:    Laughs and squeals:  yes  Smile spontaneously:  yes  [place] and begins to babble:  yes  Brings hands together in the midline:  yes  Reaches for objects::  yes  Follows moving objects from side to side:  yes  Rolls over from stomach to back:  yes  Lifts head to 90° and lifts chest off floor when prone:  yes  Plays with feet:yes    Objective     There were no vitals taken for this visit.  Physical Exam   Constitutional: He appears well-developed and well-nourished. He has a strong cry.   HENT:   Head: Anterior fontanelle is flat.   Right Ear: Tympanic membrane normal.   Left Ear: Tympanic membrane normal.   Nose: Nose normal.   Mouth/Throat:  Mucous membranes are moist. Oropharynx is clear.   Eyes: Red reflex is present bilaterally. Pupils are equal, round, and reactive to light.   Neck: Neck supple.   Cardiovascular: Normal rate and regular rhythm. Pulses are palpable.   Pulmonary/Chest: Effort normal and breath sounds normal.   Abdominal: Soft. Bowel sounds are normal. He exhibits no distension. There is no hepatosplenomegaly. There is no tenderness.   Musculoskeletal: Normal range of motion.   Neurological: He is alert. He has normal strength. Suck normal.   Skin: Skin is warm and dry. Turgor is normal.         Assessment/Plan   Rigoberto was seen today for well child.    Diagnoses and all orders for this visit:    Encounter for routine child health examination without abnormal findings    Other orders  -     DTaP HepB IPV Combined Vaccine IM  -     HiB PRP-T Conjugate Vaccine 4 Dose IM  -     Pneumococcal Conjugate Vaccine 13-Valent All  -     Rotavirus Vaccine PentaValent 3 Dose Oral          1. Anticipatory guidance discussed.      Parents were instructed to keep chemicals, , and medications locked up and out of reach.  They should keep a poison control sticker handy and call poison control it the child ingests anything.  The child should be playing only with large toys.  Plastic bags should be ripped up and thrown out.  Outlets should be covered.  Stairs should be gated as needed.  Unsafe foods include popcorn, peanuts, candy, gum, hot dogs, grapes, and raw carrots.  The child is to be supervised anytime he or she is in water.  Sunscreen should be used as needed.  General  burn safety include setting hot water heater to 120°, matches and lighters should be locked up, candles should not be left burning, smoke alarms should be checked regularly, and a fire safety plan in place.  Guns in the home should be unloaded and locked up. The child should be in an approved car seat, in the back seat, rear facing until age 2, then forward facing, but not  in the front seat with an airbag. Do not use walkers.  Do not prop bottle or put baby to sleep with a bottle.  Discussed teething.  Encouraged book sharing in the home.    2. Development: appropriate for age      3. Immunizations: discussed risk/benefits to vaccination, reviewed components of the vaccine, discussed VIS, discussed informed consent and informed consent obtained. Patient was allowed to accept or refuse vaccine. Questions answered to satisfactory state of patient. We reviewed typical age appropriate and seasonally appropriate vaccinations. Reviewed immunization history and updated state vaccination form as needed.    4. Diet: discussed starting solids if taking over 30 ounces of formula. If already taking cereal may strart baby food with a spoon. Start with vegetables, may add a new food every 3-4 days. May go onto fruits after that. If breast fed may start a spoon or wait until 6months    Return in about 2 months (around 4/3/2020) for well child.

## 2020-03-23 ENCOUNTER — TELEPHONE (OUTPATIENT)
Dept: FAMILY MEDICINE CLINIC | Facility: CLINIC | Age: 55
End: 2020-03-23

## 2020-04-13 ENCOUNTER — HOSPITAL (OUTPATIENT)
Dept: OTHER | Age: 55
End: 2020-04-13

## 2020-04-13 ENCOUNTER — NURSE TRIAGE (OUTPATIENT)
Dept: FAMILY MEDICINE CLINIC | Facility: CLINIC | Age: 55
End: 2020-04-13

## 2020-04-13 ENCOUNTER — DIAGNOSTIC TRANS (OUTPATIENT)
Dept: OTHER | Age: 55
End: 2020-04-13

## 2020-04-13 LAB
ALBUMIN SERPL-MCNC: 3.8 G/DL (ref 3.6–5.1)
ALBUMIN/GLOB SERPL: 0.9 {RATIO} (ref 1–2.4)
ALP SERPL-CCNC: 70 UNITS/L (ref 45–117)
ALT SERPL-CCNC: 45 UNITS/L
AMORPH SED URNS QL MICRO: NORMAL
ANALYZER ANC (IANC): NORMAL
ANION GAP SERPL CALC-SCNC: 6 MMOL/L (ref 10–20)
APPEARANCE UR: CLEAR
APTT PPP: 25 SEC (ref 22–32)
APTT PPP: NORMAL S
APTT PPP: NORMAL S
AST SERPL-CCNC: 26 UNITS/L
BACTERIA #/AREA URNS HPF: NORMAL /HPF
BASOPHILS # BLD: 0 K/MCL (ref 0–0.3)
BASOPHILS NFR BLD: 0 %
BILIRUB SERPL-MCNC: 0.4 MG/DL (ref 0.2–1)
BILIRUB UR QL: NEGATIVE
BUN SERPL-MCNC: 10 MG/DL (ref 6–20)
BUN/CREAT SERPL: 9 (ref 7–25)
CALCIUM SERPL-MCNC: 9.3 MG/DL (ref 8.4–10.2)
CAOX CRY URNS QL MICRO: NORMAL
CHLORIDE SERPL-SCNC: 106 MMOL/L (ref 98–107)
CO2 SERPL-SCNC: 31 MMOL/L (ref 21–32)
COLOR UR: YELLOW
CREAT SERPL-MCNC: 1.12 MG/DL (ref 0.67–1.17)
DIFFERENTIAL METHOD BLD: NORMAL
EOSINOPHIL # BLD: 0.1 K/MCL (ref 0.1–0.5)
EOSINOPHIL NFR BLD: 2 %
EPITH CASTS #/AREA URNS LPF: NORMAL /[LPF]
ERYTHROCYTE [DISTWIDTH] IN BLOOD: 11.9 % (ref 11–15)
FATTY CASTS #/AREA URNS LPF: NORMAL /[LPF]
GLOBULIN SER-MCNC: 4.1 G/DL (ref 2–4)
GLUCOSE SERPL-MCNC: 86 MG/DL (ref 65–99)
GLUCOSE UR-MCNC: NEGATIVE MG/DL
GRAN CASTS #/AREA URNS LPF: NORMAL /[LPF]
HCT VFR BLD CALC: 44.6 % (ref 39–51)
HGB BLD-MCNC: 15 G/DL (ref 13–17)
HGB UR QL: NEGATIVE
HYALINE CASTS #/AREA URNS LPF: NORMAL /LPF (ref 0–5)
IMM GRANULOCYTES # BLD AUTO: 0 K/MCL (ref 0–0.2)
IMM GRANULOCYTES NFR BLD: 0 %
INR PPP: 1.1
INR PPP: NORMAL
KETONES UR-MCNC: NEGATIVE MG/DL
LEUKOCYTE ESTERASE UR QL STRIP: NEGATIVE
LIPASE SERPL-CCNC: 83 UNITS/L (ref 73–393)
LYMPHOCYTES # BLD: 2 K/MCL (ref 1–4)
LYMPHOCYTES NFR BLD: 39 %
MAGNESIUM SERPL-MCNC: 2.2 MG/DL (ref 1.7–2.4)
MCH RBC QN AUTO: 31.8 PG (ref 26–34)
MCHC RBC AUTO-ENTMCNC: 33.6 G/DL (ref 32–36.5)
MCV RBC AUTO: 94.5 FL (ref 78–100)
MIXED CELL CASTS #/AREA URNS LPF: NORMAL /[LPF]
MONOCYTES # BLD: 0.7 K/MCL (ref 0.3–0.9)
MONOCYTES NFR BLD: 15 %
MUCOUS THREADS URNS QL MICRO: PRESENT
NEUTROPHILS # BLD: 2.2 K/MCL (ref 1.8–7.7)
NEUTROPHILS NFR BLD: 44 %
NEUTS SEG NFR BLD: NORMAL %
NITRITE UR QL: NEGATIVE
NRBC (NRBCRE): 0 /100 WBC
PH UR: 6 UNITS (ref 5–7)
PLATELET # BLD: 167 K/MCL (ref 140–450)
POTASSIUM SERPL-SCNC: 3.9 MMOL/L (ref 3.4–5.1)
PROT SERPL-MCNC: 7.9 G/DL (ref 6.4–8.2)
PROT UR QL: NEGATIVE MG/DL
PROTHROMBIN TIME (PRT2): NORMAL
PROTHROMBIN TIME: 11.3 SEC (ref 9.7–11.8)
RBC # BLD: 4.72 MIL/MCL (ref 4.5–5.9)
RBC #/AREA URNS HPF: NORMAL /HPF (ref 0–2)
RBC CASTS #/AREA URNS LPF: NORMAL /[LPF]
RENAL EPI CELLS #/AREA URNS HPF: NORMAL /[HPF]
SODIUM SERPL-SCNC: 139 MMOL/L (ref 135–145)
SP GR UR: 1.01 (ref 1–1.03)
SPECIMEN SOURCE: NORMAL
SPERM URNS QL MICRO: NORMAL
SQUAMOUS #/AREA URNS HPF: NORMAL /HPF (ref 0–5)
T VAGINALIS URNS QL MICRO: NORMAL
TRI-PHOS CRY URNS QL MICRO: NORMAL
TROPONIN I SERPL HS-MCNC: <0.02 NG/ML
URATE CRY URNS QL MICRO: NORMAL
UROBILINOGEN UR QL: 0.2 MG/DL (ref 0–1)
WAXY CASTS #/AREA URNS LPF: NORMAL /[LPF]
WBC # BLD: 5 K/MCL (ref 4.2–11)
WBC #/AREA URNS HPF: NORMAL /HPF (ref 0–5)
WBC CASTS #/AREA URNS LPF: NORMAL /[LPF]
YEAST HYPHAE URNS QL MICRO: NORMAL
YEAST URNS QL MICRO: NORMAL

## 2020-04-13 PROCEDURE — 93010 ELECTROCARDIOGRAM REPORT: CPT | Performed by: INTERNAL MEDICINE

## 2020-04-13 PROCEDURE — 99285 EMERGENCY DEPT VISIT HI MDM: CPT | Performed by: EMERGENCY MEDICINE

## 2020-04-13 NOTE — TELEPHONE ENCOUNTER
Action Requested: Summary for Provider     []  Critical Lab, Recommendations Needed  [] Need Additional Advice  [x]   FYI    []   Need Orders  [] Need Medications Sent to Pharmacy  []  Other     SUMMARY: Constant abdominal pain, 9 out of 10; advised to go

## 2020-04-15 ENCOUNTER — TELEPHONE (OUTPATIENT)
Dept: FAMILY MEDICINE CLINIC | Facility: CLINIC | Age: 55
End: 2020-04-15

## 2020-04-15 DIAGNOSIS — K65.4 MESENTERIC PANNICULITIS (HCC): Primary | ICD-10-CM

## 2020-04-15 NOTE — TELEPHONE ENCOUNTER
Patient states he was advised to follow up with PCP after ER visit on 04/13/2020 for stomach and back pain. Please confirm if we can schedule a phone or video appointment.

## 2020-04-16 ENCOUNTER — VIRTUAL PHONE E/M (OUTPATIENT)
Dept: FAMILY MEDICINE CLINIC | Facility: CLINIC | Age: 55
End: 2020-04-16

## 2020-04-16 DIAGNOSIS — K65.4 MESENTERIC PANNICULITIS (HCC): Primary | ICD-10-CM

## 2020-04-16 PROCEDURE — 99213 OFFICE O/P EST LOW 20 MIN: CPT | Performed by: FAMILY MEDICINE

## 2020-04-16 RX ORDER — METHYLPREDNISOLONE 4 MG/1
TABLET ORAL
Qty: 1 KIT | Refills: 0 | Status: SHIPPED | OUTPATIENT
Start: 2020-04-16 | End: 2020-07-09

## 2020-04-16 NOTE — PROGRESS NOTES
Virtual Telephone Check-In    Morro Diane verbally consents to a Virtual/Telephone Check-In visit on 04/16/20. Patient understands and accepts financial responsibility for any deductible, co-insurance and/or co-pays associated with this service.

## 2020-04-21 RX ORDER — METHYLPREDNISOLONE 4 MG/1
TABLET ORAL
Qty: 1 KIT | Refills: 0 | Status: SHIPPED | OUTPATIENT
Start: 2020-04-21 | End: 2020-07-09

## 2020-04-24 ENCOUNTER — TELEPHONE (OUTPATIENT)
Dept: FAMILY MEDICINE CLINIC | Facility: CLINIC | Age: 55
End: 2020-04-24

## 2020-04-24 NOTE — TELEPHONE ENCOUNTER
Patient called and advised he needs a new letter for his employer. He is asking for a letter extending his time off of work. He is still feeling lightheaded and dizzy. He is asking for a letter giving him until May 8th off of work.        Please Advise

## 2020-04-24 NOTE — TELEPHONE ENCOUNTER
The patient's work statement has been completed. Please call the patient and let him know. I sent a straight to my chart.

## 2020-05-01 ENCOUNTER — TELEPHONE (OUTPATIENT)
Dept: FAMILY MEDICINE CLINIC | Facility: CLINIC | Age: 55
End: 2020-05-01

## 2020-05-01 NOTE — TELEPHONE ENCOUNTER
Patient requesting a new note for work indicating he could return to work on 05/06/2020 instead of 05/08/2020.

## 2020-05-01 NOTE — TELEPHONE ENCOUNTER
The note has been written and is on the chart and I sent it straight to my chart. Please call the patient and let him know.

## 2020-05-09 ENCOUNTER — DIAGNOSTIC TRANS (OUTPATIENT)
Dept: OTHER | Age: 55
End: 2020-05-09

## 2020-05-09 ENCOUNTER — HOSPITAL (OUTPATIENT)
Dept: OTHER | Age: 55
End: 2020-05-09

## 2020-05-09 LAB
ALBUMIN SERPL-MCNC: 3.9 G/DL (ref 3.6–5.1)
ALBUMIN/GLOB SERPL: 1 {RATIO} (ref 1–2.4)
ALP SERPL-CCNC: 73 UNITS/L (ref 45–117)
ALT SERPL-CCNC: 50 UNITS/L
ANALYZER ANC (IANC): NORMAL
ANION GAP SERPL CALC-SCNC: 8 MMOL/L (ref 10–20)
AST SERPL-CCNC: 54 UNITS/L
BASOPHILS # BLD: 0 K/MCL (ref 0–0.3)
BASOPHILS NFR BLD: 1 %
BILIRUB SERPL-MCNC: 0.3 MG/DL (ref 0.2–1)
BUN SERPL-MCNC: 12 MG/DL (ref 6–20)
BUN/CREAT SERPL: 9 (ref 7–25)
CALCIUM SERPL-MCNC: 9 MG/DL (ref 8.4–10.2)
CHLORIDE SERPL-SCNC: 105 MMOL/L (ref 98–107)
CO2 SERPL-SCNC: 30 MMOL/L (ref 21–32)
CREAT SERPL-MCNC: 1.32 MG/DL (ref 0.67–1.17)
DIFFERENTIAL METHOD BLD: NORMAL
EOSINOPHIL # BLD: 0.1 K/MCL (ref 0.1–0.5)
EOSINOPHIL NFR BLD: 2 %
ERYTHROCYTE [DISTWIDTH] IN BLOOD: 12.4 % (ref 11–15)
GLOBULIN SER-MCNC: 4.1 G/DL (ref 2–4)
GLUCOSE SERPL-MCNC: 130 MG/DL (ref 65–99)
HCT VFR BLD CALC: 42.6 % (ref 39–51)
HGB BLD-MCNC: 14.7 G/DL (ref 13–17)
IMM GRANULOCYTES # BLD AUTO: 0 K/MCL (ref 0–0.2)
IMM GRANULOCYTES NFR BLD: 0 %
LYMPHOCYTES # BLD: 2 K/MCL (ref 1–4)
LYMPHOCYTES NFR BLD: 38 %
MCH RBC QN AUTO: 32.4 PG (ref 26–34)
MCHC RBC AUTO-ENTMCNC: 34.5 G/DL (ref 32–36.5)
MCV RBC AUTO: 93.8 FL (ref 78–100)
MONOCYTES # BLD: 0.6 K/MCL (ref 0.3–0.9)
MONOCYTES NFR BLD: 12 %
NEUTROPHILS # BLD: 2.5 K/MCL (ref 1.8–7.7)
NEUTROPHILS NFR BLD: 47 %
NEUTS SEG NFR BLD: NORMAL %
NRBC (NRBCRE): 0 /100 WBC
PLATELET # BLD: 163 K/MCL (ref 140–450)
POTASSIUM SERPL-SCNC: 4 MMOL/L (ref 3.4–5.1)
PROT SERPL-MCNC: 8 G/DL (ref 6.4–8.2)
RBC # BLD: 4.54 MIL/MCL (ref 4.5–5.9)
SODIUM SERPL-SCNC: 139 MMOL/L (ref 135–145)
TROPONIN I SERPL HS-MCNC: <0.02 NG/ML
WBC # BLD: 5.3 K/MCL (ref 4.2–11)

## 2020-05-09 PROCEDURE — 99284 EMERGENCY DEPT VISIT MOD MDM: CPT | Performed by: EMERGENCY MEDICINE

## 2020-07-09 ENCOUNTER — OFFICE VISIT (OUTPATIENT)
Dept: FAMILY MEDICINE CLINIC | Facility: CLINIC | Age: 55
End: 2020-07-09

## 2020-07-09 VITALS
SYSTOLIC BLOOD PRESSURE: 100 MMHG | WEIGHT: 242 LBS | DIASTOLIC BLOOD PRESSURE: 70 MMHG | HEIGHT: 72 IN | BODY MASS INDEX: 32.78 KG/M2 | TEMPERATURE: 98 F | RESPIRATION RATE: 17 BRPM

## 2020-07-09 DIAGNOSIS — Z02.1 PHYSICAL EXAM, PRE-EMPLOYMENT: Primary | ICD-10-CM

## 2020-07-09 PROCEDURE — 99213 OFFICE O/P EST LOW 20 MIN: CPT | Performed by: FAMILY MEDICINE

## 2020-07-09 NOTE — PATIENT INSTRUCTIONS
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 Monitor blood pressures and record at home. Limit salt intake. Encouraged physical fitness and daily physical activity daily (PLAY).   Recommend weight loss via

## 2020-07-09 NOTE — PROGRESS NOTES
HPI:    Patient ID: Makenna Hallman is a 49year old male. 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 Monitor blood pressures and record at home. Limit salt intake.   E

## 2020-07-16 ENCOUNTER — TELEPHONE (OUTPATIENT)
Dept: FAMILY MEDICINE CLINIC | Facility: CLINIC | Age: 55
End: 2020-07-16

## 2020-07-17 NOTE — TELEPHONE ENCOUNTER
Patient dropped off FMLA forms at the [facility] office, filled the [employer], paid $25.00 cash, scanned to the [employer] dept and placed in the forms box.

## 2020-07-21 NOTE — TELEPHONE ENCOUNTER
Dr. Emily Russo,    [facility] recert     Please sign off on form:  -Highlight the patient and hit \"Chart\" button.   -In Chart Review, w/in the Encounter tab - click 1 time on the Telephone call encounter for 7/16/20 Scroll down the telephone encounter.  -Click \"

## 2020-07-22 NOTE — TELEPHONE ENCOUNTER
Patient called back and states he will pick the paper up today     Thank you  Please advice  [phone]

## 2021-01-05 NOTE — PROGRESS NOTES
HPI: Severino Real is a 48year old male who presents for work note. Pt was off of work for 4 days for personal reasons. No physical illness. Pt states it was due to mental reasons. He is ready to return to work tomorrow.   He does not feel like he needs thera

## 2021-01-16 ENCOUNTER — NURSE TRIAGE (OUTPATIENT)
Dept: FAMILY MEDICINE CLINIC | Facility: CLINIC | Age: 56
End: 2021-01-16

## 2021-01-16 ENCOUNTER — HOSPITAL ENCOUNTER (EMERGENCY)
Age: 56
Discharge: HOME | End: 2021-01-16
Attending: EMERGENCY MEDICINE

## 2021-01-16 VITALS
SYSTOLIC BLOOD PRESSURE: 128 MMHG | WEIGHT: 244.49 LBS | HEART RATE: 94 BPM | TEMPERATURE: 98.1 F | RESPIRATION RATE: 17 BRPM | DIASTOLIC BLOOD PRESSURE: 89 MMHG | OXYGEN SATURATION: 97 %

## 2021-01-16 DIAGNOSIS — K29.00 ACUTE GASTRITIS WITHOUT HEMORRHAGE, UNSPECIFIED GASTRITIS TYPE: Primary | ICD-10-CM

## 2021-01-16 LAB
ALBUMIN SERPL-MCNC: 3.6 G/DL (ref 3.6–5.1)
ALBUMIN/GLOB SERPL: 0.8 {RATIO} (ref 1–2.4)
ALP SERPL-CCNC: 78 UNITS/L (ref 45–117)
ALT SERPL-CCNC: 27 UNITS/L
ANION GAP SERPL CALC-SCNC: 11 MMOL/L (ref 10–20)
APPEARANCE UR: CLEAR
AST SERPL-CCNC: 17 UNITS/L
BACTERIA #/AREA URNS HPF: ABNORMAL /HPF
BASOPHILS # BLD: 0 K/MCL (ref 0–0.3)
BASOPHILS NFR BLD: 0 %
BILIRUB SERPL-MCNC: 0.4 MG/DL (ref 0.2–1)
BILIRUB UR QL STRIP: NEGATIVE
BUN SERPL-MCNC: 13 MG/DL (ref 6–20)
BUN/CREAT SERPL: 11 (ref 7–25)
CALCIUM SERPL-MCNC: 9.3 MG/DL (ref 8.4–10.2)
CHLORIDE SERPL-SCNC: 105 MMOL/L (ref 98–107)
CO2 SERPL-SCNC: 26 MMOL/L (ref 21–32)
COLOR UR: YELLOW
CREAT SERPL-MCNC: 1.22 MG/DL (ref 0.67–1.17)
DEPRECATED RDW RBC: 42.6 FL (ref 39–50)
EOSINOPHIL # BLD: 0 K/MCL (ref 0–0.5)
EOSINOPHIL NFR BLD: 1 %
ERYTHROCYTE [DISTWIDTH] IN BLOOD: 12.6 % (ref 11–15)
FASTING DURATION TIME PATIENT: ABNORMAL H
GFR SERPLBLD BASED ON 1.73 SQ M-ARVRAT: 77 ML/MIN/1.73M2
GLOBULIN SER-MCNC: 4.4 G/DL (ref 2–4)
GLUCOSE SERPL-MCNC: 203 MG/DL (ref 65–99)
GLUCOSE UR STRIP-MCNC: NEGATIVE MG/DL
HCT VFR BLD CALC: 43.5 % (ref 39–51)
HGB BLD-MCNC: 14.9 G/DL (ref 13–17)
HGB UR QL STRIP: NEGATIVE
HYALINE CASTS #/AREA URNS LPF: ABNORMAL /LPF
IMM GRANULOCYTES # BLD AUTO: 0 K/MCL (ref 0–0.2)
IMM GRANULOCYTES # BLD: 0 %
KETONES UR STRIP-MCNC: NEGATIVE MG/DL
LEUKOCYTE ESTERASE UR QL STRIP: NEGATIVE
LIPASE SERPL-CCNC: 68 UNITS/L (ref 73–393)
LYMPHOCYTES # BLD: 1.8 K/MCL (ref 1–4)
LYMPHOCYTES NFR BLD: 31 %
MCH RBC QN AUTO: 31.4 PG (ref 26–34)
MCHC RBC AUTO-ENTMCNC: 34.3 G/DL (ref 32–36.5)
MCV RBC AUTO: 91.6 FL (ref 78–100)
MONOCYTES # BLD: 0.5 K/MCL (ref 0.3–0.9)
MONOCYTES NFR BLD: 9 %
MUCOUS THREADS URNS QL MICRO: PRESENT
NEUTROPHILS # BLD: 3.4 K/MCL (ref 1.8–7.7)
NEUTROPHILS NFR BLD: 59 %
NITRITE UR QL STRIP: NEGATIVE
NRBC BLD MANUAL-RTO: 0 /100 WBC
PH UR STRIP: 6 [PH] (ref 5–7)
PLATELET # BLD AUTO: 188 K/MCL (ref 140–450)
POTASSIUM SERPL-SCNC: 3.8 MMOL/L (ref 3.4–5.1)
PROT SERPL-MCNC: 8 G/DL (ref 6.4–8.2)
PROT UR STRIP-MCNC: 30 MG/DL
RAINBOW EXTRA TUBES HOLD SPECIMEN: NORMAL
RBC # BLD: 4.75 MIL/MCL (ref 4.5–5.9)
RBC #/AREA URNS HPF: ABNORMAL /HPF
SODIUM SERPL-SCNC: 138 MMOL/L (ref 135–145)
SP GR UR STRIP: 1.02 (ref 1–1.03)
SPERM URNS QL MICRO: PRESENT
SQUAMOUS #/AREA URNS HPF: ABNORMAL /HPF
UROBILINOGEN UR STRIP-MCNC: 2 MG/DL
WBC # BLD: 5.8 K/MCL (ref 4.2–11)
WBC #/AREA URNS HPF: ABNORMAL /HPF

## 2021-01-16 PROCEDURE — 81001 URINALYSIS AUTO W/SCOPE: CPT | Performed by: EMERGENCY MEDICINE

## 2021-01-16 PROCEDURE — 36415 COLL VENOUS BLD VENIPUNCTURE: CPT

## 2021-01-16 PROCEDURE — C9803 HOPD COVID-19 SPEC COLLECT: HCPCS

## 2021-01-16 PROCEDURE — 80053 COMPREHEN METABOLIC PANEL: CPT | Performed by: EMERGENCY MEDICINE

## 2021-01-16 PROCEDURE — 83690 ASSAY OF LIPASE: CPT | Performed by: EMERGENCY MEDICINE

## 2021-01-16 PROCEDURE — 10002803 HB RX 637: Performed by: EMERGENCY MEDICINE

## 2021-01-16 PROCEDURE — 99284 EMERGENCY DEPT VISIT MOD MDM: CPT | Performed by: EMERGENCY MEDICINE

## 2021-01-16 PROCEDURE — 99284 EMERGENCY DEPT VISIT MOD MDM: CPT

## 2021-01-16 PROCEDURE — 10002801 HB RX 250 W/O HCPCS: Performed by: EMERGENCY MEDICINE

## 2021-01-16 PROCEDURE — 93005 ELECTROCARDIOGRAM TRACING: CPT | Performed by: EMERGENCY MEDICINE

## 2021-01-16 PROCEDURE — 85025 COMPLETE CBC W/AUTO DIFF WBC: CPT | Performed by: EMERGENCY MEDICINE

## 2021-01-16 PROCEDURE — U0005 INFEC AGEN DETEC AMPLI PROBE: HCPCS | Performed by: EMERGENCY MEDICINE

## 2021-01-16 PROCEDURE — 93010 ELECTROCARDIOGRAM REPORT: CPT | Performed by: INTERNAL MEDICINE

## 2021-01-16 RX ORDER — FAMOTIDINE 20 MG/1
20 TABLET, FILM COATED ORAL ONCE
Status: COMPLETED | OUTPATIENT
Start: 2021-01-16 | End: 2021-01-16

## 2021-01-16 RX ADMIN — FAMOTIDINE 20 MG: 20 TABLET ORAL at 16:09

## 2021-01-16 RX ADMIN — Medication 15 ML: at 14:02

## 2021-01-16 ASSESSMENT — ENCOUNTER SYMPTOMS
HEADACHES: 0
BACK PAIN: 1
CHILLS: 0
SORE THROAT: 0
ABDOMINAL PAIN: 1
SHORTNESS OF BREATH: 0
FEVER: 0
DIZZINESS: 0

## 2021-01-16 NOTE — TELEPHONE ENCOUNTER
Advised to go to ER now; patient agreed, will go to nearest ER now. Since Thursday evening, after some alcohol/food, he has had new and constant abdominal pain, middle abdomen to throughout, worse Thursday and he rated 7 now.  He also said he has low bennie

## 2021-01-17 LAB
ATRIAL RATE (BPM): 98
P AXIS (DEGREES): 39
PR-INTERVAL (MSEC): 156
QRS-INTERVAL (MSEC): 84
QT-INTERVAL (MSEC): 344
QTC: 439
R AXIS (DEGREES): 4
REPORT TEXT: NORMAL
SARS-COV-2 RNA RESP QL NAA+PROBE: NOT DETECTED
SERVICE CMNT-IMP: NORMAL
SERVICE CMNT-IMP: NORMAL
T AXIS (DEGREES): -7
VENTRICULAR RATE EKG/MIN (BPM): 98

## 2021-01-18 NOTE — TELEPHONE ENCOUNTER
Pt was seen in [facility] ER on 1/16/21 per chart notes. MDM  Number of Diagnoses or Management Options  Diagnosis management comments: 52-year-old male presented to the ER for evaluation of upper abdominal pain. , Consider gastritis, pancrea

## 2021-05-11 ENCOUNTER — NURSE TRIAGE (OUTPATIENT)
Dept: FAMILY MEDICINE CLINIC | Facility: CLINIC | Age: 56
End: 2021-05-11

## 2021-05-11 NOTE — TELEPHONE ENCOUNTER
Action Requested: Summary for Provider     []  Critical Lab, Recommendations Needed  [] Need Additional Advice  []   FYI    []   Need Orders  [] Need Medications Sent to Pharmacy  []  Other     SUMMARY:     Reports swelling to left calf for 6-7 days.   No r

## 2021-05-12 NOTE — TELEPHONE ENCOUNTER
Spoke with patient--reports he was seen at [facility] in [facility] (his sister lives there) and assessed--patient is scheduled for US left leg today after work--around 3:30 p.m.     Patient states his calf feels sightly better after icing--he was told by  physicia

## 2021-05-13 ENCOUNTER — TELEPHONE (OUTPATIENT)
Dept: INTERNAL MEDICINE | Age: 56
End: 2021-05-13

## 2021-05-13 DIAGNOSIS — M79.662 PAIN OF LEFT LOWER LEG: Primary | ICD-10-CM

## 2021-05-13 NOTE — TELEPHONE ENCOUNTER
Dr. Daniela Reed - Patient asking if Ultrasound test can be ordered instead of ER visit. Ultrasound Left Leg was not completed yesterday. Please advise. Thank you.    Please reply to pool: EM RN TRIAGE      RN called patient to follow-up on results of ultrasoun

## 2021-05-13 NOTE — TELEPHONE ENCOUNTER
RN called patient. RN informed patient of provider's message below. RN provided phone number to schedule test.   RN advised him to get this done as soon as possible - today preferably.    RN advised patient if symptoms get severely worse, patient should s

## 2021-05-16 ENCOUNTER — HOSPITAL ENCOUNTER (OUTPATIENT)
Dept: ULTRASOUND IMAGING | Age: 56
Discharge: HOME | End: 2021-05-16
Attending: FAMILY MEDICINE
Payer: COMMERCIAL

## 2021-05-16 DIAGNOSIS — M79.662 PAIN OF LEFT LOWER LEG: ICD-10-CM

## 2021-05-16 PROCEDURE — 93971 EXTREMITY STUDY: CPT | Performed by: FAMILY MEDICINE

## 2021-07-20 ENCOUNTER — TELEPHONE (OUTPATIENT)
Dept: FAMILY MEDICINE CLINIC | Facility: CLINIC | Age: 56
End: 2021-07-20

## 2021-07-20 NOTE — TELEPHONE ENCOUNTER
Pt dropped off [facility] paperwork to be completed. signed Signa Ascencion and would like a call when it's ready for .  Pt wants to pick it up does not want it faxed over

## 2021-07-27 NOTE — TELEPHONE ENCOUNTER
Dr. Shon Bianchi,    Yearly recert due to Pulmonary Embolism. Pt's next appt is on 8/17/21     Please sign off on form:  -Highlight the patient and hit \"Chart\" button. -In Chart Review, w/in the Encounter tab - click 1 time on the Telephone call encounter for 7/20/21 Scroll down the telephone encounter.  -Click \"scan on\" blue Hyperlink under \"Media\" heading for FMLA Dr. Shon Bianchi 7/27/21  w/in the telephone enc.  -Click on Acknowledge button at the bottom right corner and left-click onto image, signature stamp appears and drag signature to Provider signature line. Stamp will turn blue. Close window.      Thank you,  [facility]

## 2021-08-17 ENCOUNTER — OFFICE VISIT (OUTPATIENT)
Dept: FAMILY MEDICINE CLINIC | Facility: CLINIC | Age: 56
End: 2021-08-17

## 2021-08-17 VITALS
RESPIRATION RATE: 17 BRPM | SYSTOLIC BLOOD PRESSURE: 109 MMHG | BODY MASS INDEX: 33 KG/M2 | TEMPERATURE: 98 F | HEART RATE: 72 BPM | HEIGHT: 72 IN | DIASTOLIC BLOOD PRESSURE: 69 MMHG

## 2021-08-17 DIAGNOSIS — M25.562 CHRONIC PAIN OF LEFT KNEE: Primary | ICD-10-CM

## 2021-08-17 DIAGNOSIS — G47.33 OSA ON CPAP: ICD-10-CM

## 2021-08-17 DIAGNOSIS — R09.81 NASAL SINUS CONGESTION: ICD-10-CM

## 2021-08-17 DIAGNOSIS — Z99.89 OSA ON CPAP: ICD-10-CM

## 2021-08-17 DIAGNOSIS — G89.29 CHRONIC PAIN OF LEFT KNEE: Primary | ICD-10-CM

## 2021-08-17 PROCEDURE — 3074F SYST BP LT 130 MM HG: CPT | Performed by: FAMILY MEDICINE

## 2021-08-17 PROCEDURE — 3078F DIAST BP <80 MM HG: CPT | Performed by: FAMILY MEDICINE

## 2021-08-17 PROCEDURE — 99214 OFFICE O/P EST MOD 30 MIN: CPT | Performed by: FAMILY MEDICINE

## 2021-08-17 NOTE — PATIENT INSTRUCTIONS
CPAP titration test placed on the chart. X-ray of the left knee also ordered.   Patient actually might have nasal sinus congestion secondary to the recent high index of heat/humidity, however his recent headaches may be more a result of obstructive sleep a

## 2021-08-18 ENCOUNTER — ORDER TRANSCRIPTION (OUTPATIENT)
Dept: SLEEP CENTER | Age: 56
End: 2021-08-18

## 2021-08-18 DIAGNOSIS — G47.33 OBSTRUCTIVE SLEEP APNEA (ADULT) (PEDIATRIC): Primary | ICD-10-CM

## 2021-08-21 NOTE — PROGRESS NOTES
HPI/Subjective:   Patient ID: Makenna Hallman is a 48year old male. 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   It is starting to hinder his capacity t congestion  Patient might benefit from topical steroid nasal spray versus a short course of pseudoephedrine product to be taken orally. No orders of the defined types were placed in this encounter.       Meds This Visit:  Requested Prescriptions      No

## 2021-08-25 ENCOUNTER — HOSPITAL ENCOUNTER (OUTPATIENT)
Dept: GENERAL RADIOLOGY | Age: 56
Discharge: HOME | End: 2021-08-25
Attending: FAMILY MEDICINE
Payer: COMMERCIAL

## 2021-08-25 DIAGNOSIS — G89.29 CHRONIC PAIN OF LEFT KNEE: ICD-10-CM

## 2021-08-25 DIAGNOSIS — M25.562 CHRONIC PAIN OF LEFT KNEE: ICD-10-CM

## 2021-08-25 PROCEDURE — 73562 X-RAY EXAM OF KNEE 3: CPT | Performed by: FAMILY MEDICINE

## 2021-09-13 ENCOUNTER — LAB ENCOUNTER (OUTPATIENT)
Dept: LAB | Facility: HOSPITAL | Age: 56
End: 2021-09-13
Attending: FAMILY MEDICINE
Payer: COMMERCIAL

## 2021-09-13 DIAGNOSIS — G47.33 OBSTRUCTIVE SLEEP APNEA (ADULT) (PEDIATRIC): ICD-10-CM

## 2021-09-14 LAB — SARS-COV-2 RNA RESP QL NAA+PROBE: NOT DETECTED

## 2021-09-15 ENCOUNTER — OFFICE VISIT (OUTPATIENT)
Dept: SLEEP CENTER | Age: 56
End: 2021-09-15
Attending: FAMILY MEDICINE
Payer: COMMERCIAL

## 2021-09-15 DIAGNOSIS — G47.33 OSA ON CPAP: ICD-10-CM

## 2021-09-15 DIAGNOSIS — Z99.89 OSA ON CPAP: ICD-10-CM

## 2021-09-15 DIAGNOSIS — Z76.89 SLEEP CONCERN: Primary | ICD-10-CM

## 2021-09-15 PROCEDURE — 95811 POLYSOM 6/>YRS CPAP 4/> PARM: CPT

## 2021-09-22 DIAGNOSIS — Z99.89 OSA ON CPAP: Primary | ICD-10-CM

## 2021-09-22 DIAGNOSIS — G47.33 OSA ON CPAP: Primary | ICD-10-CM

## 2021-12-02 ENCOUNTER — TELEPHONE (OUTPATIENT)
Dept: FAMILY MEDICINE CLINIC | Facility: CLINIC | Age: 56
End: 2021-12-02

## 2021-12-02 NOTE — TELEPHONE ENCOUNTER
Patient requesting an earlier appointment for ER follow up. Dr. Sarah Francois next available is 1/11. Patient declined other provider.

## 2021-12-03 NOTE — TELEPHONE ENCOUNTER
Spoke, with the patient and he did schedule an appt with Dr. Shai Gill for 12-7-21 at 11:40 this was a double book ok 'd per the doctors message below.

## 2021-12-03 NOTE — TELEPHONE ENCOUNTER
See this patient can come and see me on Tuesday, December 7, 2021 at 11:40 AM.  You can double book that time slot.

## 2021-12-07 ENCOUNTER — OFFICE VISIT (OUTPATIENT)
Dept: FAMILY MEDICINE CLINIC | Facility: CLINIC | Age: 56
End: 2021-12-07

## 2021-12-07 VITALS
WEIGHT: 236 LBS | DIASTOLIC BLOOD PRESSURE: 80 MMHG | BODY MASS INDEX: 31.97 KG/M2 | HEART RATE: 94 BPM | HEIGHT: 72 IN | RESPIRATION RATE: 18 BRPM | SYSTOLIC BLOOD PRESSURE: 115 MMHG

## 2021-12-07 DIAGNOSIS — S20.212D CONTUSION OF RIB ON LEFT SIDE, SUBSEQUENT ENCOUNTER: ICD-10-CM
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
## 2021-12-07 DIAGNOSIS — S09.90XD TRAUMATIC INJURY OF HEAD, SUBSEQUENT ENCOUNTER: ICD-10-CM

## 2021-12-07 DIAGNOSIS — Z23 FLU VACCINE NEED: ICD-10-CM

## 2021-12-07 DIAGNOSIS — S13.4XXD WHIPLASH INJURY TO NECK, SUBSEQUENT ENCOUNTER: ICD-10-CM

## 2021-12-07 DIAGNOSIS — S06.0X9A CONCUSSION WITH BRIEF LOC: ICD-10-CM

## 2021-12-07 DIAGNOSIS — M54.9 VERTEBRAL COLUMN PAIN: ICD-10-CM

## 2021-12-07 DIAGNOSIS — R42 DIZZINESS: ICD-10-CM

## 2021-12-07 PROCEDURE — 3079F DIAST BP 80-89 MM HG: CPT | Performed by: FAMILY MEDICINE

## 2021-12-07 PROCEDURE — 99214 OFFICE O/P EST MOD 30 MIN: CPT | Performed by: FAMILY MEDICINE

## 2021-12-07 PROCEDURE — 3008F BODY MASS INDEX DOCD: CPT | Performed by: FAMILY MEDICINE

## 2021-12-07 PROCEDURE — 90471 IMMUNIZATION ADMIN: CPT | Performed by: FAMILY MEDICINE

## 2021-12-07 PROCEDURE — 90686 IIV4 VACC NO PRSV 0.5 ML IM: CPT | Performed by: FAMILY MEDICINE

## 2021-12-07 PROCEDURE — 3074F SYST BP LT 130 MM HG: CPT | Performed by: FAMILY MEDICINE

## 2021-12-07 RX ORDER — CYCLOBENZAPRINE HCL 10 MG
10 TABLET ORAL 3 TIMES DAILY
Qty: 30 TABLET | Refills: 1 | Status: SHIPPED | OUTPATIENT
Start: 2021-12-07 | End: 2021-12-27

## 2021-12-07 NOTE — PROGRESS NOTES
Subjective:   Patient ID: Abhay Sprain is a 55year old male. This 52-year-old -American gentleman is doing a follow-up after a fall that occurred while at work on duty on 2021-12-01.   The patient was on the external part of his truck and fel present. Back:       Comments: Areas of spasm as depicted. Neurological:      General: No focal deficit present. Mental Status: He is alert and oriented to person, place, and time. Assessment & Plan:   1.  Fall, subsequent encounter  R

## 2021-12-07 NOTE — PATIENT INSTRUCTIONS
Patient being referred to PT regarding fall while at work. Seasonal flu vaccine consented to and administered on today. Pain management via prescription medications as needed. Comply with medications. Monitor blood pressures and record at home.  Limit s

## 2021-12-20 ENCOUNTER — APPOINTMENT (OUTPATIENT)
Dept: GENERAL RADIOLOGY | Age: 56
End: 2021-12-20
Attending: EMERGENCY MEDICINE

## 2021-12-20 ENCOUNTER — HOSPITAL ENCOUNTER (EMERGENCY)
Age: 56
Discharge: LEFT BEFORE DISCHARGE | End: 2021-12-20

## 2021-12-20 VITALS
WEIGHT: 242.51 LBS | HEART RATE: 81 BPM | DIASTOLIC BLOOD PRESSURE: 86 MMHG | RESPIRATION RATE: 15 BRPM | OXYGEN SATURATION: 98 % | BODY MASS INDEX: 32.85 KG/M2 | HEIGHT: 72 IN | SYSTOLIC BLOOD PRESSURE: 126 MMHG | TEMPERATURE: 97.7 F

## 2021-12-20 LAB
ATRIAL RATE (BPM): 79
P AXIS (DEGREES): 42
PR-INTERVAL (MSEC): 152
QRS-INTERVAL (MSEC): 90
QT-INTERVAL (MSEC): 378
QTC: 433
R AXIS (DEGREES): 11
REPORT TEXT: NORMAL
T AXIS (DEGREES): -5
VENTRICULAR RATE EKG/MIN (BPM): 79

## 2021-12-20 PROCEDURE — 93005 ELECTROCARDIOGRAM TRACING: CPT | Performed by: EMERGENCY MEDICINE

## 2021-12-20 PROCEDURE — 10003627 HB COUNTER ED NO SERVICE

## 2021-12-20 PROCEDURE — 10004651 HB RX, NO CHARGE ITEM: Performed by: EMERGENCY MEDICINE

## 2021-12-20 PROCEDURE — 71046 X-RAY EXAM CHEST 2 VIEWS: CPT

## 2021-12-20 RX ORDER — ACETAMINOPHEN 500 MG
1000 TABLET ORAL ONCE
Status: COMPLETED | OUTPATIENT
Start: 2021-12-20 | End: 2021-12-20

## 2021-12-20 RX ADMIN — ACETAMINOPHEN 1000 MG: 500 TABLET ORAL at 04:17

## 2022-01-04 ENCOUNTER — MED REC SCAN ONLY (OUTPATIENT)
Dept: FAMILY MEDICINE CLINIC | Facility: CLINIC | Age: 57
End: 2022-01-04

## 2022-01-11 ENCOUNTER — OFFICE VISIT (OUTPATIENT)
Dept: FAMILY MEDICINE CLINIC | Facility: CLINIC | Age: 57
End: 2022-01-11
Payer: OTHER MISCELLANEOUS

## 2022-01-11 VITALS
BODY MASS INDEX: 32.54 KG/M2 | HEIGHT: 72 IN | SYSTOLIC BLOOD PRESSURE: 114 MMHG | HEART RATE: 79 BPM | WEIGHT: 240.25 LBS | DIASTOLIC BLOOD PRESSURE: 74 MMHG | TEMPERATURE: 98 F

## 2022-01-11 DIAGNOSIS — S09.90XD TRAUMATIC INJURY OF HEAD, SUBSEQUENT ENCOUNTER: Primary | ICD-10-CM

## 2022-01-11 DIAGNOSIS — S20.212D CONTUSION OF RIB ON LEFT SIDE, SUBSEQUENT ENCOUNTER: ICD-10-CM

## 2022-01-11 DIAGNOSIS — S13.4XXD WHIPLASH INJURY TO NECK, SUBSEQUENT ENCOUNTER: ICD-10-CM

## 2022-01-11 DIAGNOSIS — S06.0X9A CONCUSSION WITH BRIEF LOC: ICD-10-CM
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
## 2022-01-11 PROCEDURE — 99214 OFFICE O/P EST MOD 30 MIN: CPT | Performed by: FAMILY MEDICINE

## 2022-01-11 PROCEDURE — 3008F BODY MASS INDEX DOCD: CPT | Performed by: FAMILY MEDICINE

## 2022-01-11 PROCEDURE — 3078F DIAST BP <80 MM HG: CPT | Performed by: FAMILY MEDICINE

## 2022-01-11 PROCEDURE — 3074F SYST BP LT 130 MM HG: CPT | Performed by: FAMILY MEDICINE

## 2022-01-11 NOTE — PROGRESS NOTES
Subjective:   Patient ID: Char London is a 55year old male. This is a summary for this patient who was seen on December 7, 2021 for a work-related accident that occurred on December 1, 2021:     This 60-year-old -American gentleman is doing Exam  Constitutional:       Appearance: Normal appearance. HENT:      Head: Normocephalic. Cardiovascular:      Rate and Rhythm: Normal rate and regular rhythm. Pulses: Normal pulses. Heart sounds: Normal heart sounds.    Pulmonary:      Effor

## 2022-01-11 NOTE — PATIENT INSTRUCTIONS
Pain management and continuation with physical therapy. Follow-up appointment in the first week of February 2022.

## 2022-01-12 ENCOUNTER — TELEPHONE (OUTPATIENT)
Dept: FAMILY MEDICINE CLINIC | Facility: CLINIC | Age: 57
End: 2022-01-12

## 2022-01-12 NOTE — TELEPHONE ENCOUNTER
Karen from [address] [place] [address] solutions would like appts on 12/7/2021 and 1/11/2022 faxed over to [phone].  Please advise

## 2022-01-27 ENCOUNTER — TELEPHONE (OUTPATIENT)
Dept: FAMILY MEDICINE CLINIC | Facility: CLINIC | Age: 57
End: 2022-01-27

## 2022-01-27 NOTE — TELEPHONE ENCOUNTER
Melecio from [facility] calling stating that they have been waiting to received pts referral for Physical therapy. Requesting if the referral can be faxed to them    Please advise.    [facility] physical Therapy  Fax#: [phone]

## 2022-01-27 NOTE — TELEPHONE ENCOUNTER
Referral for physical therapy has been submitted to [facility]. Received confirmation.

## 2022-02-08 ENCOUNTER — TELEPHONE (OUTPATIENT)
Dept: NEUROLOGY | Facility: CLINIC | Age: 57
End: 2022-02-08

## 2022-02-08 ENCOUNTER — OFFICE VISIT (OUTPATIENT)
Dept: FAMILY MEDICINE CLINIC | Facility: CLINIC | Age: 57
End: 2022-02-08
Payer: OTHER MISCELLANEOUS

## 2022-02-08 VITALS
DIASTOLIC BLOOD PRESSURE: 75 MMHG | TEMPERATURE: 98 F | SYSTOLIC BLOOD PRESSURE: 114 MMHG | HEIGHT: 72 IN | WEIGHT: 244 LBS | BODY MASS INDEX: 33.05 KG/M2 | HEART RATE: 76 BPM

## 2022-02-08 DIAGNOSIS — G44.309 POST-TRAUMATIC HEADACHE, NOT INTRACTABLE, UNSPECIFIED CHRONICITY PATTERN: ICD-10-CM

## 2022-02-08 DIAGNOSIS — S09.90XD TRAUMATIC INJURY OF HEAD, SUBSEQUENT ENCOUNTER: Primary | ICD-10-CM

## 2022-02-08 DIAGNOSIS — S06.0X9A CONCUSSION WITH BRIEF LOC: ICD-10-CM

## 2022-02-08 DIAGNOSIS — G47.33 OSA ON CPAP: ICD-10-CM

## 2022-02-08 DIAGNOSIS — R42 VERTIGO: ICD-10-CM

## 2022-02-08 DIAGNOSIS — Z99.89 OSA ON CPAP: ICD-10-CM

## 2022-02-08 DIAGNOSIS — S13.4XXD WHIPLASH INJURY TO NECK, SUBSEQUENT ENCOUNTER: ICD-10-CM

## 2022-02-08 DIAGNOSIS — M54.9 VERTEBRAL COLUMN PAIN: ICD-10-CM

## 2022-02-08 DIAGNOSIS — L20.9 ATOPIC DERMATITIS, UNSPECIFIED TYPE: ICD-10-CM

## 2022-02-08 PROCEDURE — 99214 OFFICE O/P EST MOD 30 MIN: CPT | Performed by: FAMILY MEDICINE

## 2022-02-08 PROCEDURE — 3074F SYST BP LT 130 MM HG: CPT | Performed by: FAMILY MEDICINE

## 2022-02-08 PROCEDURE — 3078F DIAST BP <80 MM HG: CPT | Performed by: FAMILY MEDICINE

## 2022-02-08 PROCEDURE — 3008F BODY MASS INDEX DOCD: CPT | Performed by: FAMILY MEDICINE

## 2022-02-08 NOTE — PATIENT INSTRUCTIONS
Patient being referred to neurology. In the meantime patient has been encouraged to continue with physical therapy as ordered 3 times a week for 8 weeks and to be reassessed at that time for continuation or completion. Pain management is fine with ibuprofen products along as that is working. Patient will likely need a head radiograph such as a CT and/or MRI according to the neurologist's determination. Patient is work status has not changed. Remains temporarily totally disabled and cannot work.

## 2022-02-08 NOTE — TELEPHONE ENCOUNTER
Physical Therapy-pending approval   Occupational Therapy-pending   Vestibular Therapy-pending     Left voice message with [address] [place] [address] # [id number] to initiate authorization for above. Clinical notes faxed for review.     Flip Leoset  Claim#: [id number]  Phone Number: [phone]  XXT:JOHN

## 2022-02-09 ENCOUNTER — TELEPHONE (OUTPATIENT)
Dept: FAMILY MEDICINE CLINIC | Facility: CLINIC | Age: 57
End: 2022-02-09

## 2022-02-09 NOTE — TELEPHONE ENCOUNTER
Melecio from [facility] calling stating that they have been seeing this pt for his lower back and they are requesting two referrals for pt. One that needs to be backdated for 1/14/2022   And one for today 2/9/2022. She states it is to received authorization for pts workman's comp case. Fax#: [phone]    Please advise.

## 2022-02-09 NOTE — TELEPHONE ENCOUNTER
tj Dewitt messages requesting a return call. Called patient to request  information. If patient returns call, obtain  name, phone and fax number also, verify claim # .

## 2022-02-10 NOTE — TELEPHONE ENCOUNTER
Hi Dr. Torsten Ghotra,    Please see message below from phone room. [facility] does not do referral/authorization for Workman's Comp. Ag Tsai needs to go through the company that is handling his Workman's Comp case.     Thank you  Jw Castro

## 2022-02-11 NOTE — TELEPHONE ENCOUNTER
Called [facility] at [facility] to gather more information about the needed referrals. Melecio PT is gone for the day and  will be back to the office on Monday. I will follow-up on Monday.

## 2022-02-14 ENCOUNTER — OFFICE VISIT (OUTPATIENT)
Dept: FAMILY MEDICINE CLINIC | Facility: CLINIC | Age: 57
End: 2022-02-14
Payer: COMMERCIAL

## 2022-02-14 VITALS
OXYGEN SATURATION: 98 % | HEIGHT: 72 IN | HEART RATE: 87 BPM | RESPIRATION RATE: 19 BRPM | BODY MASS INDEX: 32.37 KG/M2 | SYSTOLIC BLOOD PRESSURE: 100 MMHG | DIASTOLIC BLOOD PRESSURE: 76 MMHG | WEIGHT: 239 LBS

## 2022-02-14 DIAGNOSIS — R10.13 EPIGASTRIC PAIN: ICD-10-CM

## 2022-02-14 DIAGNOSIS — R10.9 MIGRATORY ABDOMINAL PAIN: Primary | ICD-10-CM

## 2022-02-14 PROCEDURE — 3008F BODY MASS INDEX DOCD: CPT | Performed by: FAMILY MEDICINE

## 2022-02-14 PROCEDURE — 3074F SYST BP LT 130 MM HG: CPT | Performed by: FAMILY MEDICINE

## 2022-02-14 PROCEDURE — 3078F DIAST BP <80 MM HG: CPT | Performed by: FAMILY MEDICINE

## 2022-02-14 PROCEDURE — 99214 OFFICE O/P EST MOD 30 MIN: CPT | Performed by: FAMILY MEDICINE

## 2022-02-14 NOTE — TELEPHONE ENCOUNTER
Called and spoke to patient, notified the office was unable to obtain  information. Available options for continuation of care are, self pay or submit request through insurance and any bills received,submit to his . Patient states, submit to insurance but pt was informed the  called our office with  information. Verified  information with front office staff, no  information available     Requests submitted to [facility] for review.

## 2022-02-14 NOTE — TELEPHONE ENCOUNTER
Melecio at [facility] calling, confirmed pateint's name and . Melecio states that the patient needs a new referral.     The recommendation is for 3x per week for 4 weeks. Please fax to [phone]. Dr. Torsten Ghotra, I see a referral from Dr. Yann Camilo but it's only for 1 visit. Should I ask him to edit that one or should you enter a new one?

## 2022-02-14 NOTE — TELEPHONE ENCOUNTER
Physical Therapy CPT CODE 22312, [id number] -pending approval   Occupational Therapy CPT Code: 67886, [id number]   Vestibular Therapy CPT CODE: 20536, [id number]    Clinical notes sent to [facility] for review.

## 2022-02-14 NOTE — PATIENT INSTRUCTIONS
I have recommended that the patient continue to drink increased amounts of water. Patient also can take to apple cider vinegar Gummies per day. Navarro Joanne over the counter tablets to be taken twice a day as well for bulking stool. We can consider Linzess and/or Amitiza if these conservative natural treatments do not resolve the patient's symptoms. We are aware that the patient is on a 5-year plan for colonoscopy which would have the patient to have a repeat in 2024, however if symptoms persist we will refer to GI sooner.

## 2022-02-14 NOTE — TELEPHONE ENCOUNTER
Incoming message from [facility] , Sharath Chester.  who provided claim adjusters name and phone number. [facility], spoke to Sharath Chester who states pt walk-in to provide  name and phone number.       [address]   Phone : [phone]   [facility] # [id number]

## 2022-02-15 NOTE — TELEPHONE ENCOUNTER
Spoke with pt,  verified, pt stated he is returning our call. Pt was informed of MD recommendation, pt stated understanding.

## 2022-02-15 NOTE — TELEPHONE ENCOUNTER
I redid the neurology referral so that he can have 3 additional visits. Please call the patient let him know. Referral for external physical therapy also done on the chart. Please let the patient know.

## 2022-02-15 NOTE — TELEPHONE ENCOUNTER
Incoming call from [facility], fax clinical notes for   claim # [id number] for review. If request is approved, patient has the option to go to any facility go for his therapy.      Fax# [phone]     Clinical notes sent for review

## 2022-02-20 ENCOUNTER — APPOINTMENT (OUTPATIENT)
Dept: LAB | Facility: HOSPITAL | Age: 57
End: 2022-02-20
Attending: FAMILY MEDICINE
Payer: COMMERCIAL

## 2022-02-20 PROCEDURE — 87338 HPYLORI STOOL AG IA: CPT

## 2022-02-21 ENCOUNTER — LAB ENCOUNTER (OUTPATIENT)
Dept: LAB | Age: 57
End: 2022-02-21
Attending: FAMILY MEDICINE
Payer: COMMERCIAL

## 2022-02-21 DIAGNOSIS — R10.9 MIGRATORY ABDOMINAL PAIN: ICD-10-CM

## 2022-02-23 LAB — HELICOBACTER PYLORI AG, FECAL: NEGATIVE

## 2022-02-25 ENCOUNTER — TELEPHONE (OUTPATIENT)
Dept: FAMILY MEDICINE CLINIC | Facility: CLINIC | Age: 57
End: 2022-02-25

## 2022-02-25 NOTE — TELEPHONE ENCOUNTER
A Ellie calling from Dr Cesia Huang office to request Dr wells a Peer to peer which is due 2/28 by the end of our business day regarding therapy.    Dr. Quin Chowdhury   [phone]

## 2022-02-28 NOTE — TELEPHONE ENCOUNTER
There really is no good time for [id number]-mg-[id number] review. I suggest any day except for Wednesdays at 1:10 PM just prior to starting the afternoon clinic. Please call the  and let them know. This does not include today which is Monday, February 28, 2022.

## 2022-03-01 NOTE — TELEPHONE ENCOUNTER
Called  [facility] for status. States to contact nurse  Patsy Curtis for status on claim # [id number]    Called nurse case [address] who states Vestibular Therapy is approved for 12 Sessions. A similar  request from pt PCP was received AND  patient will continue to go to [facility] for vestibular PT. In addition, Physical therapy is certified for 59-52 sessions however, advised, any PT requests after the 24 sessions are not certified. As of Feb 18, 2022 pt has had 22 PT sessions. Nurse management Ezequiel Salinas, can not approved Occupational therapy due to insufficient information. Requesting clarification, why is occupational therapy being order and how many OT sessions are being requested? Message routed for  OT clarification     Left message for pt notifying of above.

## 2022-03-08 ENCOUNTER — OFFICE VISIT (OUTPATIENT)
Dept: FAMILY MEDICINE CLINIC | Facility: CLINIC | Age: 57
End: 2022-03-08
Payer: COMMERCIAL

## 2022-03-08 VITALS
HEART RATE: 96 BPM | HEIGHT: 72 IN | DIASTOLIC BLOOD PRESSURE: 75 MMHG | BODY MASS INDEX: 32.51 KG/M2 | SYSTOLIC BLOOD PRESSURE: 115 MMHG | WEIGHT: 240 LBS

## 2022-03-08 DIAGNOSIS — R10.13 EPIGASTRIC PAIN: ICD-10-CM

## 2022-03-08 DIAGNOSIS — R10.9 MIGRATORY ABDOMINAL PAIN: Primary | ICD-10-CM

## 2022-03-08 PROCEDURE — 3008F BODY MASS INDEX DOCD: CPT | Performed by: FAMILY MEDICINE

## 2022-03-08 PROCEDURE — 99214 OFFICE O/P EST MOD 30 MIN: CPT | Performed by: FAMILY MEDICINE

## 2022-03-08 PROCEDURE — 3078F DIAST BP <80 MM HG: CPT | Performed by: FAMILY MEDICINE

## 2022-03-08 PROCEDURE — 3074F SYST BP LT 130 MM HG: CPT | Performed by: FAMILY MEDICINE

## 2022-03-08 NOTE — PATIENT INSTRUCTIONS
Patient encouraged to continue with healthy dietary choices with consistency. Apple cider vinegar Gummies and FiberCon can be added to the patient's daily regimen for colon health. Increase clear water intake as well.

## 2022-03-21 ENCOUNTER — TELEPHONE (OUTPATIENT)
Dept: FAMILY MEDICINE CLINIC | Facility: CLINIC | Age: 57
End: 2022-03-21

## 2022-03-21 NOTE — TELEPHONE ENCOUNTER
A Statement of Attending Physician Form was received in the [facility] via fax for patient. Form emailed to [email or web address], original form left in the [facility]. No Exposure

## 2022-04-01 ENCOUNTER — LAB ENCOUNTER (OUTPATIENT)
Dept: LAB | Age: 57
End: 2022-04-01
Attending: FAMILY MEDICINE
Payer: COMMERCIAL

## 2022-04-01 ENCOUNTER — OFFICE VISIT (OUTPATIENT)
Dept: FAMILY MEDICINE CLINIC | Facility: CLINIC | Age: 57
End: 2022-04-01
Payer: COMMERCIAL

## 2022-04-01 VITALS
RESPIRATION RATE: 19 BRPM | DIASTOLIC BLOOD PRESSURE: 75 MMHG | HEIGHT: 72 IN | BODY MASS INDEX: 31.97 KG/M2 | HEART RATE: 87 BPM | SYSTOLIC BLOOD PRESSURE: 111 MMHG | WEIGHT: 236 LBS | OXYGEN SATURATION: 98 %

## 2022-04-01 DIAGNOSIS — Z00.00 PHYSICAL EXAM: Primary | ICD-10-CM

## 2022-04-01 DIAGNOSIS — Z00.00 PHYSICAL EXAM: ICD-10-CM

## 2022-04-01 LAB
ALBUMIN SERPL-MCNC: 4 G/DL (ref 3.4–5)
ALBUMIN/GLOB SERPL: 1 {RATIO} (ref 1–2)
ALP LIVER SERPL-CCNC: 68 U/L
ALT SERPL-CCNC: 36 U/L
ANION GAP SERPL CALC-SCNC: 4 MMOL/L (ref 0–18)
AST SERPL-CCNC: 22 U/L (ref 15–37)
BASOPHILS # BLD AUTO: 0.04 X10(3) UL (ref 0–0.2)
BASOPHILS NFR BLD AUTO: 0.8 %
BILIRUB SERPL-MCNC: 0.4 MG/DL (ref 0.1–2)
BILIRUB UR QL: NEGATIVE
BUN BLD-MCNC: 14 MG/DL (ref 7–18)
BUN/CREAT SERPL: 10.1 (ref 10–20)
CALCIUM BLD-MCNC: 9.2 MG/DL (ref 8.5–10.1)
CHLORIDE SERPL-SCNC: 107 MMOL/L (ref 98–112)
CHOLEST SERPL-MCNC: 156 MG/DL (ref ?–200)
CO2 SERPL-SCNC: 28 MMOL/L (ref 21–32)
COLOR UR: YELLOW
COMPLEXED PSA SERPL-MCNC: 0.78 NG/ML (ref ?–4)
CREAT BLD-MCNC: 1.39 MG/DL
DEPRECATED RDW RBC AUTO: 48 FL (ref 35.1–46.3)
EOSINOPHIL # BLD AUTO: 0.18 X10(3) UL (ref 0–0.7)
EOSINOPHIL NFR BLD AUTO: 3.8 %
ERYTHROCYTE [DISTWIDTH] IN BLOOD BY AUTOMATED COUNT: 13 % (ref 11–15)
FASTING PATIENT LIPID ANSWER: NO
FASTING STATUS PATIENT QL REPORTED: NO
GLOBULIN PLAS-MCNC: 3.9 G/DL (ref 2.8–4.4)
GLUCOSE BLD-MCNC: 135 MG/DL (ref 70–99)
GLUCOSE UR-MCNC: NEGATIVE MG/DL
HCT VFR BLD AUTO: 46.6 %
HDLC SERPL-MCNC: 33 MG/DL (ref 40–59)
HGB BLD-MCNC: 14.9 G/DL
HGB UR QL STRIP.AUTO: NEGATIVE
IMM GRANULOCYTES # BLD AUTO: 0 X10(3) UL (ref 0–1)
IMM GRANULOCYTES NFR BLD: 0 %
KETONES UR-MCNC: NEGATIVE MG/DL
LDLC SERPL CALC-MCNC: 106 MG/DL (ref ?–100)
LYMPHOCYTES # BLD AUTO: 2.07 X10(3) UL (ref 1–4)
LYMPHOCYTES NFR BLD AUTO: 43.7 %
MCH RBC QN AUTO: 31.9 PG (ref 26–34)
MCHC RBC AUTO-ENTMCNC: 32 G/DL (ref 31–37)
MCV RBC AUTO: 99.8 FL
MONOCYTES # BLD AUTO: 0.55 X10(3) UL (ref 0.1–1)
MONOCYTES NFR BLD AUTO: 11.6 %
NEUTROPHILS # BLD AUTO: 1.9 X10 (3) UL (ref 1.5–7.7)
NEUTROPHILS # BLD AUTO: 1.9 X10(3) UL (ref 1.5–7.7)
NEUTROPHILS NFR BLD AUTO: 40.1 %
NITRITE UR QL STRIP.AUTO: NEGATIVE
NONHDLC SERPL-MCNC: 123 MG/DL (ref ?–130)
OSMOLALITY SERPL CALC.SUM OF ELEC: 291 MOSM/KG (ref 275–295)
PH UR: 5 [PH] (ref 5–8)
PLATELET # BLD AUTO: 172 10(3)UL (ref 150–450)
POTASSIUM SERPL-SCNC: 4.1 MMOL/L (ref 3.5–5.1)
PROT SERPL-MCNC: 7.9 G/DL (ref 6.4–8.2)
PROT UR-MCNC: 30 MG/DL
RBC # BLD AUTO: 4.67 X10(6)UL
SODIUM SERPL-SCNC: 139 MMOL/L (ref 136–145)
SP GR UR STRIP: 1.03 (ref 1–1.03)
TRIGL SERPL-MCNC: 89 MG/DL (ref 30–149)
TSI SER-ACNC: 1.24 MIU/ML (ref 0.36–3.74)
UROBILINOGEN UR STRIP-ACNC: 2
VIT C UR-MCNC: NEGATIVE MG/DL
VLDLC SERPL CALC-MCNC: 15 MG/DL (ref 0–30)
WBC # BLD AUTO: 4.7 X10(3) UL (ref 4–11)

## 2022-04-01 PROCEDURE — 80061 LIPID PANEL: CPT

## 2022-04-01 PROCEDURE — 3074F SYST BP LT 130 MM HG: CPT | Performed by: FAMILY MEDICINE

## 2022-04-01 PROCEDURE — 3078F DIAST BP <80 MM HG: CPT | Performed by: FAMILY MEDICINE

## 2022-04-01 PROCEDURE — 3008F BODY MASS INDEX DOCD: CPT | Performed by: FAMILY MEDICINE

## 2022-04-01 PROCEDURE — 85025 COMPLETE CBC W/AUTO DIFF WBC: CPT

## 2022-04-01 PROCEDURE — 99396 PREV VISIT EST AGE 40-64: CPT | Performed by: FAMILY MEDICINE

## 2022-04-01 PROCEDURE — 93000 ELECTROCARDIOGRAM COMPLETE: CPT | Performed by: FAMILY MEDICINE

## 2022-04-01 PROCEDURE — 36415 COLL VENOUS BLD VENIPUNCTURE: CPT

## 2022-04-01 PROCEDURE — 80053 COMPREHEN METABOLIC PANEL: CPT

## 2022-04-01 PROCEDURE — 81001 URINALYSIS AUTO W/SCOPE: CPT

## 2022-04-01 PROCEDURE — 84443 ASSAY THYROID STIM HORMONE: CPT

## 2022-04-01 NOTE — PATIENT INSTRUCTIONS
All adult screening ordered and done appropriate for patient's age and gender and risk factors and complaints. Monitor blood pressures and record at home. Limit salt intake. Recommend weight loss via daily exercising and consistent healthy dietary changes. Encouraged physical fitness and daily physical activity daily.

## 2022-04-15 ENCOUNTER — TELEPHONE (OUTPATIENT)
Dept: FAMILY MEDICINE CLINIC | Facility: CLINIC | Age: 57
End: 2022-04-15

## 2022-04-15 NOTE — TELEPHONE ENCOUNTER
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 Dates requesting are 4/1-4/28. Requesting letter be signed by Dr. Cookie Vazquez. 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      Fax [phone]

## 2022-04-15 NOTE — TELEPHONE ENCOUNTER
Pt would like a note stating that he is still under the doctors care. Pt would like to  the copy when ready. Please, call the patient when the note/letter is ready for  at [phone].

## 2022-04-18 NOTE — TELEPHONE ENCOUNTER
Dr. Laith Faria,   See patient request for note below, have you seen patient in regards to this work injury? He is asking for a note to be off for 1 months. Please advise.  Thanks

## 2022-04-27 ENCOUNTER — TELEPHONE (OUTPATIENT)
Dept: NEUROLOGY | Facility: CLINIC | Age: 57
End: 2022-04-27

## 2022-04-27 NOTE — TELEPHONE ENCOUNTER
Received a fax from [employer]  (Stephanie Christensen RN, CCM - ) requesting the office visit notes from 4/25/22 for the patients workers compensation case. The OV notes have been faxed to [phone]. Santosh Smith Reviewed and electronically signed by:  ORALIA Mei,

## 2022-04-28 ENCOUNTER — OFFICE VISIT (OUTPATIENT)
Dept: FAMILY MEDICINE CLINIC | Facility: CLINIC | Age: 57
End: 2022-04-28
Payer: OTHER MISCELLANEOUS

## 2022-04-28 VITALS
WEIGHT: 239 LBS | OXYGEN SATURATION: 100 % | SYSTOLIC BLOOD PRESSURE: 123 MMHG | DIASTOLIC BLOOD PRESSURE: 79 MMHG | HEART RATE: 72 BPM | HEIGHT: 72 IN | BODY MASS INDEX: 32.37 KG/M2

## 2022-04-28 DIAGNOSIS — Z99.89 OSA ON CPAP: ICD-10-CM

## 2022-04-28 DIAGNOSIS — G44.309 POST-TRAUMATIC HEADACHE, NOT INTRACTABLE, UNSPECIFIED CHRONICITY PATTERN: ICD-10-CM

## 2022-04-28 DIAGNOSIS — S09.90XD TRAUMATIC INJURY OF HEAD, SUBSEQUENT ENCOUNTER: ICD-10-CM

## 2022-04-28 DIAGNOSIS — S06.0X9A CONCUSSION WITH BRIEF LOC: ICD-10-CM

## 2022-04-28 DIAGNOSIS — Z23 NEED FOR ZOSTER VACCINATION: Primary | ICD-10-CM

## 2022-04-28 DIAGNOSIS — G47.33 OSA ON CPAP: ICD-10-CM

## 2022-04-28 DIAGNOSIS — F07.81 POSTCONCUSSION SYNDROME: ICD-10-CM

## 2022-04-28 PROCEDURE — 3074F SYST BP LT 130 MM HG: CPT | Performed by: FAMILY MEDICINE

## 2022-04-28 PROCEDURE — 3008F BODY MASS INDEX DOCD: CPT | Performed by: FAMILY MEDICINE

## 2022-04-28 PROCEDURE — 99214 OFFICE O/P EST MOD 30 MIN: CPT | Performed by: FAMILY MEDICINE

## 2022-04-28 PROCEDURE — 3078F DIAST BP <80 MM HG: CPT | Performed by: FAMILY MEDICINE

## 2022-04-28 NOTE — TELEPHONE ENCOUNTER
Dr. Jessica Herron,     Please sign off on form: Disability   12/1/22 - 5/3/22  -Highlight the patient and hit \"Chart\" button. -In Chart Review, w/in the Encounter tab - click 1 time on the Telephone call encounter for 3/21/22 Scroll down the telephone encounter.  -Click \"scan on\" blue Hyperlink under \"Media\" heading for Disab Dr Jessica Herron 4/28/22  w/in the telephone enc.  -Click on Acknowledge button at the bottom right corner and left-click onto image, signature stamp appears and drag signature to Provider signature line. Stamp will turn blue. Close window.      Thank you,    Jailyn Coleman

## 2022-04-28 NOTE — PATIENT INSTRUCTIONS
Medication reviewed and renewed where needed and appropriate. Comply with medications. Monitor blood pressures and record at home. Limit salt intake. Recommend weight loss via daily exercising and consistent healthy dietary changes. Encouraged physical fitness and daily physical activity daily. Patient has been encouraged to continue with any and all exercises learned via PT.

## 2022-05-05 ENCOUNTER — TELEPHONE (OUTPATIENT)
Dept: FAMILY MEDICINE CLINIC | Facility: CLINIC | Age: 57
End: 2022-05-05

## 2022-05-05 NOTE — TELEPHONE ENCOUNTER
Patient called in and states he has not heard about CPAP machine yet, sleep study completed in Sept 2021 and DME's ordered 9/22/21 and 4/28/21. [facility] and spoke with Efrem Zuleta, they have not received referral for patient. Refaxed all information to home medical at [phone]. Patient notified, verbalized understanding. Patient expressed concern at delay, notified patient we would follow up with home medical tomorrow to verify fax was received.

## 2022-05-06 NOTE — TELEPHONE ENCOUNTER
[facility], confirmed patient's name and . She states that she is emailing the appropriate staff and that we will receive a call back if they have not received the fax from yesterday.

## 2022-05-06 NOTE — TELEPHONE ENCOUNTER
Mechelle Vasquez from [facility] mable Worthy  ( identified name and  )  States needs more information concerning this  patient , Jalil Ruby will fax what she is requesting  the [facility]   Provided  fax number to [place] [phone]      O [facility] please be aware of incoming fax , Thank you

## 2022-05-09 ENCOUNTER — MED REC SCAN ONLY (OUTPATIENT)
Dept: FAMILY MEDICINE CLINIC | Facility: CLINIC | Age: 57
End: 2022-05-09

## 2022-05-19 ENCOUNTER — TELEPHONE (OUTPATIENT)
Dept: FAMILY MEDICINE CLINIC | Facility: CLINIC | Age: 57
End: 2022-05-19

## 2022-05-19 DIAGNOSIS — G47.33 OBSTRUCTIVE SLEEP APNEA: Primary | ICD-10-CM

## 2022-05-24 ENCOUNTER — TELEPHONE (OUTPATIENT)
Dept: FAMILY MEDICINE CLINIC | Facility: CLINIC | Age: 57
End: 2022-05-24

## 2022-05-24 DIAGNOSIS — G47.33 OSA (OBSTRUCTIVE SLEEP APNEA): Primary | ICD-10-CM

## 2022-06-20 ENCOUNTER — NURSE TRIAGE (OUTPATIENT)
Dept: FAMILY MEDICINE CLINIC | Facility: CLINIC | Age: 57
End: 2022-06-20

## 2022-06-21 ENCOUNTER — OFFICE VISIT (OUTPATIENT)
Dept: FAMILY MEDICINE CLINIC | Facility: CLINIC | Age: 57
End: 2022-06-21
Payer: COMMERCIAL

## 2022-06-21 VITALS
DIASTOLIC BLOOD PRESSURE: 72 MMHG | SYSTOLIC BLOOD PRESSURE: 123 MMHG | RESPIRATION RATE: 18 BRPM | BODY MASS INDEX: 31.94 KG/M2 | TEMPERATURE: 96 F | WEIGHT: 235.81 LBS | HEIGHT: 72 IN | HEART RATE: 78 BPM

## 2022-06-21 DIAGNOSIS — M79.662 PAIN OF LEFT CALF: Primary | ICD-10-CM

## 2022-06-21 DIAGNOSIS — M79.89 CALF SWELLING: ICD-10-CM

## 2022-06-21 PROCEDURE — 3078F DIAST BP <80 MM HG: CPT | Performed by: FAMILY MEDICINE

## 2022-06-21 PROCEDURE — 3008F BODY MASS INDEX DOCD: CPT | Performed by: FAMILY MEDICINE

## 2022-06-21 PROCEDURE — 99214 OFFICE O/P EST MOD 30 MIN: CPT | Performed by: FAMILY MEDICINE

## 2022-06-21 PROCEDURE — 3074F SYST BP LT 130 MM HG: CPT | Performed by: FAMILY MEDICINE

## 2022-06-21 NOTE — PROGRESS NOTES
Subjective:   Patient ID: Mariluz Corrales is a 55year old male. Patient presents with calf pain as below    Pain  This is a new problem. The current episode started 1 to 4 weeks ago. The problem occurs daily. The problem has been waxing and waning. The symptoms are aggravated by walking. History/Other:   Review of Systems  No current outpatient medications on file. Allergies:No Known Allergies    Objective:   Physical Exam  Constitutional:       Appearance: Normal appearance. Cardiovascular:      Rate and Rhythm: Normal rate and regular rhythm. Pulses: Normal pulses. Heart sounds: Normal heart sounds. Pulmonary:      Effort: Pulmonary effort is normal.      Breath sounds: Normal breath sounds. Musculoskeletal:      Right lower leg: No edema. Left lower leg: No edema. Comments: See below   Neurological:      Mental Status: He is alert. Assessment & Plan:   Pain of left calf  (primary encounter diagnosis)  Calf swelling  Pain in the left calf for the past month. Worse with walking, stairs. No dyspnea, chest pain. History of Achilles tendinitis, but also pulmonary emboli. On exam mild calf tenderness. Left calf circumference 43 cm, right calf circumference 41 cm. No ankle or foot edema. Rule out DVT, tendinitis. Check Doppler. If negative recommend Aleve twice daily as needed, physical therapy. No orders of the defined types were placed in this encounter.       Meds This Visit:  Requested Prescriptions      No prescriptions requested or ordered in this encounter       Imaging & Referrals:  PHYSICAL THERAPY - INTERNAL  US VENOUS DOPPLER LEG LEFT - DIAG IMG (CPT=93971)

## 2022-06-22 ENCOUNTER — HOSPITAL ENCOUNTER (OUTPATIENT)
Dept: ULTRASOUND IMAGING | Facility: HOSPITAL | Age: 57
Discharge: HOME | End: 2022-06-22
Attending: FAMILY MEDICINE
Payer: COMMERCIAL

## 2022-06-22 DIAGNOSIS — M79.89 CALF SWELLING: ICD-10-CM

## 2022-06-22 DIAGNOSIS — I82.532: Primary | ICD-10-CM

## 2022-06-22 DIAGNOSIS — M79.662 PAIN OF LEFT CALF: ICD-10-CM

## 2022-06-22 PROCEDURE — 93971 EXTREMITY STUDY: CPT | Performed by: FAMILY MEDICINE

## 2022-06-28 NOTE — PATIENT INSTRUCTIONS
Triamcinolone 0.1% cream prescribed. Consider derm. May need to wear gloves at night.
no chest pain and no edema.

## 2022-07-18 ENCOUNTER — OFFICE VISIT (OUTPATIENT)
Dept: FAMILY MEDICINE CLINIC | Facility: CLINIC | Age: 57
End: 2022-07-18
Payer: COMMERCIAL

## 2022-07-18 VITALS
WEIGHT: 225 LBS | HEIGHT: 72 IN | HEART RATE: 78 BPM | BODY MASS INDEX: 30.48 KG/M2 | SYSTOLIC BLOOD PRESSURE: 102 MMHG | DIASTOLIC BLOOD PRESSURE: 78 MMHG

## 2022-07-18 DIAGNOSIS — M21.42 PES PLANUS OF BOTH FEET: ICD-10-CM

## 2022-07-18 DIAGNOSIS — G47.33 OBSTRUCTIVE SLEEP APNEA: ICD-10-CM

## 2022-07-18 DIAGNOSIS — Z23 NEED FOR TDAP VACCINATION: ICD-10-CM

## 2022-07-18 DIAGNOSIS — M21.41 PES PLANUS OF BOTH FEET: ICD-10-CM

## 2022-07-18 DIAGNOSIS — M21.619 BUNION OF GREAT TOE: Primary | ICD-10-CM

## 2022-07-18 PROCEDURE — 99214 OFFICE O/P EST MOD 30 MIN: CPT | Performed by: FAMILY MEDICINE

## 2022-07-18 PROCEDURE — 3074F SYST BP LT 130 MM HG: CPT | Performed by: FAMILY MEDICINE

## 2022-07-18 PROCEDURE — 3078F DIAST BP <80 MM HG: CPT | Performed by: FAMILY MEDICINE

## 2022-07-18 PROCEDURE — 90471 IMMUNIZATION ADMIN: CPT | Performed by: FAMILY MEDICINE

## 2022-07-18 PROCEDURE — 90715 TDAP VACCINE 7 YRS/> IM: CPT | Performed by: FAMILY MEDICINE

## 2022-07-18 PROCEDURE — 3008F BODY MASS INDEX DOCD: CPT | Performed by: FAMILY MEDICINE

## 2022-07-18 NOTE — PATIENT INSTRUCTIONS
Patient being referred to podiatry for pes planus/bilateral bunions of both great toes. Medication reviewed and renewed where needed and appropriate. Comply with medications. Monitor blood pressures and record at home. Limit salt intake. Encouraged physical fitness and daily physical activity daily. Tdap ordered and given on today.
none

## 2022-07-20 ENCOUNTER — TELEPHONE (OUTPATIENT)
Dept: FAMILY MEDICINE CLINIC | Facility: CLINIC | Age: 57
End: 2022-07-20

## 2022-07-20 DIAGNOSIS — M79.662 PAIN OF LEFT CALF: ICD-10-CM

## 2022-07-20 DIAGNOSIS — M21.619 BUNION OF GREAT TOE: Primary | ICD-10-CM

## 2022-07-20 DIAGNOSIS — M21.41 PES PLANUS OF BOTH FEET: ICD-10-CM

## 2022-07-20 DIAGNOSIS — M21.42 PES PLANUS OF BOTH FEET: ICD-10-CM

## 2022-07-20 NOTE — TELEPHONE ENCOUNTER
Dr Sidhu First I pended PT referral for patient please advise if appropriate and missing DXs. Thank you!

## 2022-07-20 NOTE — TELEPHONE ENCOUNTER
Abimael Mancia of [facility] physical therapy wants an order faxed to them for patient for PT for his foot and ankle on the left  (2-3 times/week for 6 weeks). Phone: [phone]  Fax [phone]    Patient is requesting referral.     Name of specialist and specialty department :   Reason for visit with the specialist: lower extremity pain  Address of the specialist office: [address] [place] [address]  Appointment date: urgent         CSS informed patient the turnaround time for referral is 5-7 business days. Patient was informed to check their [employer] account for referral status.

## 2022-07-23 NOTE — TELEPHONE ENCOUNTER
PT referral has been completed and signed. Please the patient and all pertinent parties and let them know. Thank you.

## 2022-07-25 NOTE — TELEPHONE ENCOUNTER
Message sent to referral department. Please call patient once referral is approved or if referral is denied.

## 2022-08-08 ENCOUNTER — OFFICE VISIT (OUTPATIENT)
Dept: PODIATRY CLINIC | Facility: CLINIC | Age: 57
End: 2022-08-08
Payer: COMMERCIAL

## 2022-08-08 DIAGNOSIS — Q66.212 METATARSUS PRIMUS VARUS OF BOTH FEET: ICD-10-CM

## 2022-08-08 DIAGNOSIS — M20.12 VALGUS DEFORMITY OF BOTH GREAT TOES: ICD-10-CM

## 2022-08-08 DIAGNOSIS — M20.11 VALGUS DEFORMITY OF BOTH GREAT TOES: ICD-10-CM

## 2022-08-08 DIAGNOSIS — B35.1 ONYCHOMYCOSIS: ICD-10-CM

## 2022-08-08 DIAGNOSIS — M76.62 ACHILLES TENDINITIS, LEFT LEG: Primary | ICD-10-CM

## 2022-08-08 DIAGNOSIS — Q66.211 METATARSUS PRIMUS VARUS OF BOTH FEET: ICD-10-CM

## 2022-08-08 PROCEDURE — 99203 OFFICE O/P NEW LOW 30 MIN: CPT | Performed by: PODIATRIST

## 2022-08-09 ENCOUNTER — TELEPHONE (OUTPATIENT)
Dept: PODIATRY CLINIC | Facility: CLINIC | Age: 57
End: 2022-08-09

## 2022-08-09 NOTE — TELEPHONE ENCOUNTER
Per pt forgot his physical therapy referral and asking to fax it to Maggie with [employer]    Fax: [phone]

## 2022-08-22 ENCOUNTER — TELEPHONE (OUTPATIENT)
Dept: ORTHOPEDICS CLINIC | Facility: CLINIC | Age: 57
End: 2022-08-22

## 2022-08-25 ENCOUNTER — TELEPHONE (OUTPATIENT)
Dept: ORTHOPEDICS CLINIC | Facility: CLINIC | Age: 57
End: 2022-08-25

## 2022-08-25 DIAGNOSIS — B35.1 ONYCHOMYCOSIS: Primary | ICD-10-CM

## 2022-08-30 ENCOUNTER — NURSE TRIAGE (OUTPATIENT)
Dept: FAMILY MEDICINE CLINIC | Facility: CLINIC | Age: 57
End: 2022-08-30

## 2022-09-02 ENCOUNTER — LAB ENCOUNTER (OUTPATIENT)
Dept: LAB | Facility: HOSPITAL | Age: 57
End: 2022-09-02
Attending: FAMILY MEDICINE
Payer: COMMERCIAL

## 2022-09-02 ENCOUNTER — TELEPHONE (OUTPATIENT)
Dept: PODIATRY CLINIC | Facility: CLINIC | Age: 57
End: 2022-09-02

## 2022-09-02 ENCOUNTER — OFFICE VISIT (OUTPATIENT)
Dept: FAMILY MEDICINE CLINIC | Facility: CLINIC | Age: 57
End: 2022-09-02
Payer: COMMERCIAL

## 2022-09-02 VITALS
OXYGEN SATURATION: 98 % | HEIGHT: 72 IN | TEMPERATURE: 98 F | HEART RATE: 80 BPM | DIASTOLIC BLOOD PRESSURE: 80 MMHG | WEIGHT: 229 LBS | BODY MASS INDEX: 31.02 KG/M2 | RESPIRATION RATE: 18 BRPM | SYSTOLIC BLOOD PRESSURE: 118 MMHG

## 2022-09-02 DIAGNOSIS — M79.10 MYALGIA: ICD-10-CM

## 2022-09-02 DIAGNOSIS — M54.50 LUMBAR BACK PAIN: Primary | ICD-10-CM

## 2022-09-02 DIAGNOSIS — B35.1 ONYCHOMYCOSIS: ICD-10-CM

## 2022-09-02 DIAGNOSIS — R10.9 FLANK PAIN: ICD-10-CM

## 2022-09-02 LAB
ALBUMIN SERPL-MCNC: 3.5 G/DL (ref 3.4–5)
ALP LIVER SERPL-CCNC: 67 U/L
ALT SERPL-CCNC: 33 U/L
APPEARANCE: CLEAR
BILIRUB SERPL-MCNC: 0.4 MG/DL (ref 0.1–2)
BILIRUBIN: NEGATIVE
GLUCOSE (URINE DIPSTICK): NEGATIVE MG/DL
KETONES (URINE DIPSTICK): NEGATIVE MG/DL
LEUKOCYTES: NEGATIVE
MULTISTIX LOT#: NORMAL NUMERIC
NITRITE, URINE: NEGATIVE
OCCULT BLOOD: NEGATIVE
PH, URINE: 6 (ref 4.5–8)
PROT SERPL-MCNC: 7.9 G/DL (ref 6.4–8.2)
PROTEIN (URINE DIPSTICK): NEGATIVE MG/DL
SPECIFIC GRAVITY: 1.02 (ref 1–1.03)
URINE-COLOR: YELLOW
UROBILINOGEN,SEMI-QN: 1 MG/DL (ref 0–1.9)

## 2022-09-02 PROCEDURE — 80076 HEPATIC FUNCTION PANEL: CPT

## 2022-09-02 PROCEDURE — 81002 URINALYSIS NONAUTO W/O SCOPE: CPT

## 2022-09-02 PROCEDURE — 36415 COLL VENOUS BLD VENIPUNCTURE: CPT

## 2022-09-02 PROCEDURE — 3074F SYST BP LT 130 MM HG: CPT

## 2022-09-02 PROCEDURE — 3008F BODY MASS INDEX DOCD: CPT

## 2022-09-02 PROCEDURE — 3079F DIAST BP 80-89 MM HG: CPT

## 2022-09-02 PROCEDURE — 99214 OFFICE O/P EST MOD 30 MIN: CPT

## 2022-09-02 RX ORDER — CYCLOBENZAPRINE HCL 10 MG
10 TABLET ORAL NIGHTLY
Qty: 15 TABLET | Refills: 0 | Status: SHIPPED | OUTPATIENT
Start: 2022-09-02 | End: 2022-09-17

## 2022-09-02 NOTE — TELEPHONE ENCOUNTER
Patient's blood clotted so AST and Billirubin could not be performed. Do you need those values in order to prescribe Lamisil?

## 2022-09-02 NOTE — TELEPHONE ENCOUNTER
Per John pt did a liver function panel and could not do ast and direct bill. Per John asking if Dr. Lennox Wylie wants her to contact pt.  Please advise

## 2022-09-08 ENCOUNTER — TELEPHONE (OUTPATIENT)
Dept: FAMILY MEDICINE CLINIC | Facility: CLINIC | Age: 57
End: 2022-09-08

## 2022-09-14 ENCOUNTER — TELEPHONE (OUTPATIENT)
Dept: FAMILY MEDICINE CLINIC | Facility: CLINIC | Age: 57
End: 2022-09-14

## 2022-09-21 DIAGNOSIS — G47.33 OSA ON CPAP: Primary | ICD-10-CM

## 2022-09-21 DIAGNOSIS — Z99.89 OSA ON CPAP: Primary | ICD-10-CM

## 2022-09-21 NOTE — PROGRESS NOTES
The order to update the patient's CPAP appliance has been placed on the chart. Please print a copy of it and send it to the patient's respiratory store and once this has been done please call him and let him know that the process has been initiated. Thank you.

## 2022-09-22 ENCOUNTER — LAB ENCOUNTER (OUTPATIENT)
Dept: LAB | Facility: HOSPITAL | Age: 57
End: 2022-09-22
Attending: PODIATRIST

## 2022-09-22 ENCOUNTER — MED REC SCAN ONLY (OUTPATIENT)
Dept: FAMILY MEDICINE CLINIC | Facility: CLINIC | Age: 57
End: 2022-09-22

## 2022-09-22 DIAGNOSIS — B35.1 DERMATOPHYTOSIS OF NAIL: ICD-10-CM

## 2022-09-22 LAB
AST SERPL-CCNC: 29 U/L (ref 15–37)
BILIRUB DIRECT SERPL-MCNC: 0.2 MG/DL (ref 0–0.2)

## 2022-09-22 PROCEDURE — 82248 BILIRUBIN DIRECT: CPT

## 2022-09-22 PROCEDURE — 36415 COLL VENOUS BLD VENIPUNCTURE: CPT

## 2022-09-23 ENCOUNTER — TELEPHONE (OUTPATIENT)
Dept: FAMILY MEDICINE CLINIC | Facility: CLINIC | Age: 57
End: 2022-09-23

## 2022-09-23 NOTE — TELEPHONE ENCOUNTER
Miracle Benjamin from [facility] physical therapy would like referral faxed to them as soon as possible at   [phone]. Please advise.

## 2022-09-23 NOTE — PROGRESS NOTES
Called E, verified that nothing is needed from our office, they have all the paperwork and verifying information.

## 2022-09-23 NOTE — TELEPHONE ENCOUNTER
Char Altamirano from [facility] physical therapy will like to place a primary provider referral due to insurance:    Name of specialist and specialty department : [facility] Physical Therapy Dr. Lilly Delgado  Reason for visit with the specialist: Physical therapy for back issues  Address of the specialist office: [phone] [address] [address]  Appointment date: 9/28 at 4 pm         CSS informed patient the turnaround time for referral is 5-7 business days. Patient was informed to check their [employer] account for referral status.

## 2022-10-18 ENCOUNTER — TELEPHONE (OUTPATIENT)
Dept: FAMILY MEDICINE CLINIC | Facility: CLINIC | Age: 57
End: 2022-10-18

## 2022-10-18 DIAGNOSIS — G47.33 OSA (OBSTRUCTIVE SLEEP APNEA): Primary | ICD-10-CM

## 2022-10-18 NOTE — TELEPHONE ENCOUNTER
Order from 9/22/21 is invalid order. There is a new DME order from 9/21/22 that is pending review from [facility].

## 2022-11-01 NOTE — TELEPHONE ENCOUNTER
The health plan notes that it was accidentally closed. Please sign off so we can resubmit to health plan.      Thank you,  [facility]  Referral specialist

## 2022-11-01 NOTE — TELEPHONE ENCOUNTER
Message sent to health plan to clarify.      Thank you,  [facility]  Referral specialist

## 2022-11-02 NOTE — TELEPHONE ENCOUNTER
The order for the CPAP paraphernalia has been signed. Please print this and send it to the patient's respiratory store that is supported by his insurance. Once all of this has been done please let all pertinent parties and let them know.

## 2022-11-09 NOTE — TELEPHONE ENCOUNTER
Referral was approved. Usually is faxed to DME provider with office note, PSG and demographics, which was done. Triage support can you follow up with HME and find out what additional information they need?   [phone]

## 2022-12-06 ENCOUNTER — HOSPITAL ENCOUNTER (OUTPATIENT)
Dept: GENERAL RADIOLOGY | Age: 57
Discharge: HOME | End: 2022-12-06
Attending: FAMILY MEDICINE
Payer: COMMERCIAL

## 2022-12-06 ENCOUNTER — OFFICE VISIT (OUTPATIENT)
Dept: FAMILY MEDICINE CLINIC | Facility: CLINIC | Age: 57
End: 2022-12-06
Payer: COMMERCIAL

## 2022-12-06 VITALS
SYSTOLIC BLOOD PRESSURE: 114 MMHG | WEIGHT: 239 LBS | TEMPERATURE: 97 F | HEART RATE: 68 BPM | BODY MASS INDEX: 32.37 KG/M2 | HEIGHT: 72 IN | DIASTOLIC BLOOD PRESSURE: 78 MMHG

## 2022-12-06 DIAGNOSIS — G89.29 CHRONIC MIDLINE LOW BACK PAIN WITHOUT SCIATICA: ICD-10-CM

## 2022-12-06 DIAGNOSIS — Z23 FLU VACCINE NEED: Primary | ICD-10-CM

## 2022-12-06 DIAGNOSIS — M54.50 CHRONIC MIDLINE LOW BACK PAIN WITHOUT SCIATICA: ICD-10-CM

## 2022-12-06 PROCEDURE — 3008F BODY MASS INDEX DOCD: CPT | Performed by: FAMILY MEDICINE

## 2022-12-06 PROCEDURE — 3074F SYST BP LT 130 MM HG: CPT | Performed by: FAMILY MEDICINE

## 2022-12-06 PROCEDURE — 99214 OFFICE O/P EST MOD 30 MIN: CPT | Performed by: FAMILY MEDICINE

## 2022-12-06 PROCEDURE — 3078F DIAST BP <80 MM HG: CPT | Performed by: FAMILY MEDICINE

## 2022-12-06 PROCEDURE — 72110 X-RAY EXAM L-2 SPINE 4/>VWS: CPT | Performed by: FAMILY MEDICINE

## 2022-12-06 NOTE — PATIENT INSTRUCTIONS
Xray of lumbar spine ordered. Patient instructed/encouraged to continue with ibuprofen twice a day with meals pain or no pain for 2 weeks in order to reduce inflammation in addition to any discomfort. Recommend weight loss via daily exercising and consistent healthy dietary changes. Encouraged physical fitness and daily physical activity daily. Patient will likely need to see our physiatry specialist pending the x-ray results.

## 2022-12-07 DIAGNOSIS — Q76.49 LUMBARIZATION, VERTEBRA: ICD-10-CM

## 2022-12-07 DIAGNOSIS — G89.29 CHRONIC MIDLINE LOW BACK PAIN WITHOUT SCIATICA: Primary | ICD-10-CM

## 2022-12-07 DIAGNOSIS — M54.50 CHRONIC MIDLINE LOW BACK PAIN WITHOUT SCIATICA: Primary | ICD-10-CM

## 2022-12-20 ENCOUNTER — OFFICE VISIT (OUTPATIENT)
Dept: PHYSICAL MEDICINE AND REHAB | Facility: CLINIC | Age: 57
End: 2022-12-20
Payer: COMMERCIAL

## 2022-12-20 VITALS — HEIGHT: 72 IN | BODY MASS INDEX: 32.37 KG/M2 | WEIGHT: 239 LBS

## 2022-12-20 DIAGNOSIS — G89.29 CHRONIC BILATERAL LOW BACK PAIN WITHOUT SCIATICA: ICD-10-CM

## 2022-12-20 DIAGNOSIS — M54.50 CHRONIC BILATERAL LOW BACK PAIN WITHOUT SCIATICA: ICD-10-CM

## 2022-12-20 DIAGNOSIS — M47.816 FACET ARTHROPATHY, LUMBAR: Primary | ICD-10-CM

## 2022-12-20 PROCEDURE — 99244 OFF/OP CNSLTJ NEW/EST MOD 40: CPT | Performed by: PHYSICAL MEDICINE & REHABILITATION

## 2022-12-20 PROCEDURE — 3008F BODY MASS INDEX DOCD: CPT | Performed by: PHYSICAL MEDICINE & REHABILITATION

## 2022-12-20 RX ORDER — MELOXICAM 15 MG/1
15 TABLET ORAL DAILY
Qty: 14 TABLET | Refills: 0 | Status: SHIPPED | OUTPATIENT
Start: 2022-12-20 | End: 2023-01-03

## 2022-12-20 NOTE — PATIENT INSTRUCTIONS
-Start physical therapy and home exercises  -Mobic daily for the next 7-10 days and then as needed  -Ice/Heat as tolerated  -Please stop the medication if you have any side effects and call the office if you have any questions or concerns  -If no better will consider MRI for further evaluation  -Follow up in 4 weeks

## 2023-01-17 ENCOUNTER — OFFICE VISIT (OUTPATIENT)
Dept: PHYSICAL MEDICINE AND REHAB | Facility: CLINIC | Age: 58
End: 2023-01-17
Payer: COMMERCIAL

## 2023-01-17 VITALS
HEART RATE: 90 BPM | SYSTOLIC BLOOD PRESSURE: 112 MMHG | DIASTOLIC BLOOD PRESSURE: 76 MMHG | WEIGHT: 239 LBS | OXYGEN SATURATION: 96 % | BODY MASS INDEX: 32.37 KG/M2 | HEIGHT: 72 IN

## 2023-01-17 DIAGNOSIS — G89.29 CHRONIC BILATERAL LOW BACK PAIN WITHOUT SCIATICA: ICD-10-CM

## 2023-01-17 DIAGNOSIS — M54.50 CHRONIC BILATERAL LOW BACK PAIN WITHOUT SCIATICA: ICD-10-CM

## 2023-01-17 DIAGNOSIS — M47.816 FACET ARTHROPATHY, LUMBAR: Primary | ICD-10-CM

## 2023-01-17 PROCEDURE — 99214 OFFICE O/P EST MOD 30 MIN: CPT | Performed by: PHYSICAL MEDICINE & REHABILITATION

## 2023-01-17 PROCEDURE — 3074F SYST BP LT 130 MM HG: CPT | Performed by: PHYSICAL MEDICINE & REHABILITATION

## 2023-01-17 PROCEDURE — 3008F BODY MASS INDEX DOCD: CPT | Performed by: PHYSICAL MEDICINE & REHABILITATION

## 2023-01-17 PROCEDURE — 3078F DIAST BP <80 MM HG: CPT | Performed by: PHYSICAL MEDICINE & REHABILITATION

## 2023-01-17 RX ORDER — MELOXICAM 15 MG/1
15 TABLET ORAL DAILY
Qty: 14 TABLET | Refills: 0 | Status: SHIPPED | OUTPATIENT
Start: 2023-01-17 | End: 2023-01-31

## 2023-01-17 NOTE — PATIENT INSTRUCTIONS
-MRI of the lumbar spine and follow up after  -Continue physical therapy and home exercises  -Mobic for another 2 weeks

## 2023-01-24 ENCOUNTER — MED REC SCAN ONLY (OUTPATIENT)
Dept: PHYSICAL MEDICINE AND REHAB | Facility: CLINIC | Age: 58
End: 2023-01-24

## 2023-02-03 ENCOUNTER — TELEPHONE (OUTPATIENT)
Dept: CASE MANAGEMENT | Age: 58
End: 2023-02-03

## 2023-02-03 ENCOUNTER — TELEPHONE (OUTPATIENT)
Dept: FAMILY MEDICINE CLINIC | Facility: CLINIC | Age: 58
End: 2023-02-03

## 2023-02-03 ENCOUNTER — MED REC SCAN ONLY (OUTPATIENT)
Dept: FAMILY MEDICINE CLINIC | Facility: CLINIC | Age: 58
End: 2023-02-03

## 2023-02-03 DIAGNOSIS — G47.33 OBSTRUCTIVE SLEEP APNEA (ADULT) (PEDIATRIC): Primary | ICD-10-CM

## 2023-02-03 DIAGNOSIS — G47.33 OSA (OBSTRUCTIVE SLEEP APNEA): Primary | ICD-10-CM

## 2023-02-03 NOTE — TELEPHONE ENCOUNTER
Fax received from [facility] requesting referral for replacement CPAP supplies.   Referral entered and routed to Dr Sarah Francois

## 2023-02-03 NOTE — TELEPHONE ENCOUNTER
Dr. Stephania Hollis is requesting a referral for CPAP supplies. Pended referral please review diagnosis and sign off if you agree. Thank you.   Quan Perez

## 2023-02-06 ENCOUNTER — HOSPITAL ENCOUNTER (OUTPATIENT)
Dept: MRI IMAGING | Facility: HOSPITAL | Age: 58
Discharge: HOME | End: 2023-02-06
Attending: PHYSICAL MEDICINE & REHABILITATION
Payer: COMMERCIAL

## 2023-02-06 DIAGNOSIS — M47.816 FACET ARTHROPATHY, LUMBAR: ICD-10-CM

## 2023-02-06 PROCEDURE — 72148 MRI LUMBAR SPINE W/O DYE: CPT | Performed by: PHYSICAL MEDICINE & REHABILITATION

## 2023-03-09 ENCOUNTER — TELEPHONE (OUTPATIENT)
Dept: FAMILY MEDICINE CLINIC | Facility: CLINIC | Age: 58
End: 2023-03-09

## 2023-03-09 NOTE — TELEPHONE ENCOUNTER
[employer],   see DME order for [facility] 2/5/23, they sent fax in regards to PA. Will fax over to you as well.

## 2023-03-10 ENCOUNTER — MED REC SCAN ONLY (OUTPATIENT)
Dept: PHYSICAL MEDICINE AND REHAB | Facility: CLINIC | Age: 58
End: 2023-03-10

## 2023-04-10 ENCOUNTER — NURSE TRIAGE (OUTPATIENT)
Dept: FAMILY MEDICINE CLINIC | Facility: CLINIC | Age: 58
End: 2023-04-10

## 2023-04-18 ENCOUNTER — OFFICE VISIT (OUTPATIENT)
Dept: FAMILY MEDICINE CLINIC | Facility: CLINIC | Age: 58
End: 2023-04-18

## 2023-04-18 VITALS
TEMPERATURE: 98 F | BODY MASS INDEX: 32.23 KG/M2 | HEIGHT: 72 IN | SYSTOLIC BLOOD PRESSURE: 122 MMHG | DIASTOLIC BLOOD PRESSURE: 81 MMHG | WEIGHT: 238 LBS | HEART RATE: 89 BPM

## 2023-04-18 DIAGNOSIS — R10.84 GENERALIZED ABDOMINAL PAIN: Primary | ICD-10-CM

## 2023-04-18 DIAGNOSIS — Z02.89 PHYSICAL EXAMINATION OF EMPLOYEE: ICD-10-CM

## 2023-04-18 DIAGNOSIS — Z23 NEED FOR ZOSTER VACCINATION: ICD-10-CM

## 2023-04-18 PROCEDURE — 90750 HZV VACC RECOMBINANT IM: CPT | Performed by: FAMILY MEDICINE

## 2023-04-18 PROCEDURE — 3079F DIAST BP 80-89 MM HG: CPT | Performed by: FAMILY MEDICINE

## 2023-04-18 PROCEDURE — 99214 OFFICE O/P EST MOD 30 MIN: CPT | Performed by: FAMILY MEDICINE

## 2023-04-18 PROCEDURE — 3074F SYST BP LT 130 MM HG: CPT | Performed by: FAMILY MEDICINE

## 2023-04-18 PROCEDURE — 90471 IMMUNIZATION ADMIN: CPT | Performed by: FAMILY MEDICINE

## 2023-04-18 PROCEDURE — 3008F BODY MASS INDEX DOCD: CPT | Performed by: FAMILY MEDICINE

## 2023-04-18 NOTE — PATIENT INSTRUCTIONS
Application for patient to participate in refereeing has been completed. If the patient's abdominal discomfort persists, we will proceed with a work-up which will include H. pylori testing. Zoster vaccine ordered and to be administered on today.

## 2023-05-19 ENCOUNTER — TELEPHONE (OUTPATIENT)
Facility: CLINIC | Age: 58
End: 2023-05-19

## 2023-05-22 NOTE — TELEPHONE ENCOUNTER
Dr. Marisol Cosby    Patient called to schedule recall colonoscopy. Per prior recall from path (5 year recall) not due until 2024. However, Per [facility] patient saw Dr. Evelin Montenegro in office on 4/18/2023 and was advised to call to schedule repeat colonoscopy because : \"Patient reports abdominal discomfort/burning in epigastric and also suprapubic regions with softening of the stool, but no blood in stool. There has been no fever and no associated symptoms that would suggest viral etiology. Patient due for repeat colonoscopy per GI specialist.\"    Do you want to see him in office first or ok to schedule directly?     Thank you

## 2023-05-22 NOTE — TELEPHONE ENCOUNTER
Genevia Ormond,     I would advise patient be seen prior to discuss symptoms. Can scheduled patient with myself or Dr. Tray Griffin.      Luis Credit, SKIP

## 2023-05-22 NOTE — TELEPHONE ENCOUNTER
Left message to call back. Patient needs an appointment to schedule.     CSS:  Please schedule first available office visit with Ally Morales PA-C or Dr. Akila Palm when he calls back-per below message needs to be seen in office first.

## 2023-05-24 NOTE — TELEPHONE ENCOUNTER
Patient is scheduled to see [facility] in office.     Your Appointments           Tuesday July 11, 2023 11:00 AM  [facility] [address], [facility]) [address] [place] [address],[address]  [phone]

## 2023-07-10 ENCOUNTER — NURSE TRIAGE (OUTPATIENT)
Dept: FAMILY MEDICINE CLINIC | Facility: CLINIC | Age: 58
End: 2023-07-10

## 2023-07-10 NOTE — TELEPHONE ENCOUNTER
You can add this patient to my schedule for Thursday, July 13, 2023 as a double book at 11:40 AM.  Thank you.

## 2023-07-10 NOTE — TELEPHONE ENCOUNTER
Action Requested: Summary for Provider     []  Critical Lab, Recommendations Needed  [x] Need Additional Advice  []   FYI    []   Need Orders  [] Need Medications Sent to Pharmacy  []  Other     SUMMARY: Per protocol advised : Office visit     Can patient be added to your schedule , there are no RES 24 dates open this week       Reason for call: Low Back Pain  Onset: Data Unavailable    Patient calling ( identified name and  ) states having lower back pain , abdominal pain and is under umbilical to the right side  . onset of a few months , pain has been increasing the past few weeks   Pain radiates from his right groin to his right calf    Patient has tried PT and takes Motrin with slight refief     Rates pain at 8/10 , pain is worse in the morning , stretches help the pain     Patient did have an appointment on  but our office asked him to re-schedule          Can patient be added to your schedule this week     Patient is available today after 3pm  Tomorrow after 1pm  and anytime on Thursday    . Please advise and thank you.           Best call back number: Jc Mena  at [phone]        Reason for Disposition   MODERATE back pain (e.g., interferes with normal activities) and present > 3 days    Protocols used: Back Pain-A-[place]

## 2023-07-11 ENCOUNTER — OFFICE VISIT (OUTPATIENT)
Facility: CLINIC | Age: 58
End: 2023-07-11

## 2023-07-11 VITALS
HEIGHT: 72 IN | HEART RATE: 60 BPM | SYSTOLIC BLOOD PRESSURE: 120 MMHG | BODY MASS INDEX: 30.07 KG/M2 | DIASTOLIC BLOOD PRESSURE: 86 MMHG | WEIGHT: 222 LBS

## 2023-07-11 DIAGNOSIS — R10.10 PAIN OF UPPER ABDOMEN: Primary | ICD-10-CM

## 2023-07-11 PROCEDURE — 99204 OFFICE O/P NEW MOD 45 MIN: CPT | Performed by: PHYSICIAN ASSISTANT

## 2023-07-11 PROCEDURE — 3074F SYST BP LT 130 MM HG: CPT | Performed by: PHYSICIAN ASSISTANT

## 2023-07-11 PROCEDURE — 3008F BODY MASS INDEX DOCD: CPT | Performed by: PHYSICIAN ASSISTANT

## 2023-07-11 PROCEDURE — 3079F DIAST BP 80-89 MM HG: CPT | Performed by: PHYSICIAN ASSISTANT

## 2023-07-11 RX ORDER — PANTOPRAZOLE SODIUM 40 MG/1
40 TABLET, DELAYED RELEASE ORAL
Qty: 90 TABLET | Refills: 0 | Status: SHIPPED | OUTPATIENT
Start: 2023-07-11 | End: 2023-10-09

## 2023-07-11 NOTE — H&P
[facility] - Gastroenterology                                                                                                               Reason for consult: Patient presents with:  Abdominal Pain  Colonoscopy Screening      Requesting physician or provider: Art CookrDO      HPI:   Flynn Beard is a 65year old year-old male with history of  history of colon polyps, internal hemorrhoids, PE (2009), trigeminal neuralgia, who presents for abdominal pain. Abdominal pain that started about 4 months ago. Pain is constant. Burning pain. Spicy foods make it worse. No alleviating factors. He has not tried any OTC medications. Patient notes weight loss, but has been intermittent fasting and increasing his work outs. He moves his bowels daily without strain brbpr or melena. he denies dysphagia, odynophagia and/or globus. he denies nausea and/or vomiting. he denies recent change in appetite and/or unintentional weight loss. he denies bloating. Surgical Hx:  No abdominal surgery  - See additional surgical hx below  - No known issues with sedation. + known history of sleep apnea - newly dx - does not yet have CPAP     Pertinent Family Hx:  + FHCC, father (dx age 67)  - No family hx of esophageal, gastric cancer  - No family history of IBD.      Prior endoscopies:  November 2019 colonoscopy performed by Dr. Krish Dey with [facility] related to history of colon polyps, family history of colon cancer, findings: Colon polyp x2, internal hemorrhoids, 5-year recall     April 2016 colonoscopy performed by Dr. Krish Dey with [facility] related to colon cancer screening, findings: Colon polyp x2, internal hemorrhoids, 3-year recall     Social Hx:  - No smoking  + Occasional Etoh  - Denies illicit drug use   - Occupation: [address] with spouse  - NSAIDs/ASA use: Taking NSAIDS daily for worsening hip pain        Wt Readings from Last 6 Encounters:  07/11/23 : 222 lb (100.7 kg)  04/18/23 : 238 lb (108 kg)  01/17/23 : 239 lb (108.4 kg)  12/20/22 : 239 lb (108.4 kg)  12/06/22 : 239 lb (108.4 kg)  09/02/22 : 229 lb (103.9 kg)       History, Medications, Allergies, ROS:      Past Medical History:   Diagnosis Date    Pulmonary embolism ([address] 2009    Sleep apnea     Sleep apnea, obstructive 09/2019    mild      Past Surgical History:   Procedure Laterality Date    COLONOSCOPY      COLONOSCOPY      COLONOSCOPY N/A 11/20/2019    Procedure: COLONOSCOPY;  Surgeon: Palmer Cheney MD;  Location: [facility] ENDOSCOPY    SHOULDER SURG PROC UNLISTED Left       Family Hx:   Family History   Problem Relation Age of Onset    Cancer Father 67        colon cancer      Social History:   Social History     Socioeconomic History    Marital status:    Tobacco Use    Smoking status: Never    Smokeless tobacco: Never   Vaping Use    Vaping Use: Never used   Substance and Sexual Activity    Alcohol use:  Yes     Alcohol/week: 2.0 standard drinks of alcohol     Types: 2 Cans of beer per week     Comment: on ocassion     Drug use: No   Other Topics Concern    Caffeine Concern No     Comment: tea    Exercise Yes        Medications (Active prior to today's visit):  Current Outpatient Medications   Medication Sig Dispense Refill    pantoprazole 40 MG Oral Tab EC Take 1 tablet (40 mg total) by mouth every morning before breakfast. 90 tablet 0       Allergies:  No Known Allergies    ROS:   CONSTITUTIONAL: negative for fevers, chills, sweats and weight loss  EYES Negative for red eyes, yellow eyes, changes in vision  HEENT: Negative for dysphagia and hoarseness  RESPIRATORY: Negative for cough and shortness of breath  CARDIOVASCULAR: Negative for chest pain, palpitations  GASTROINTESTINAL: See HPI  GENITOURINARY: Negative for dysuria and frequency  MUSCULOSKELETAL: Negative for arthralgias and myalgias  NEUROLOGICAL: Negative for dizziness and headaches  BEHAVIOR/PSYCH: Negative for anxiety and poor appetite    PHYSICAL EXAM:   Blood pressure 120/86, pulse 60, height 6' (1.829 m), weight 222 lb (100.7 kg). GEN: WD/WN, NAD  HEENT: Supple symmetrical, trachea midline  CV: RRR, the extremities are warm and well perfused   LUNGS: No increased work of breathing  ABDOMEN: Epigastric tenderness, no rebound or guarding. No scars, normal bowel sounds, soft,  non-distended, no masses, no hepatomegaly  MSK: No redness, no warmth, no swelling of joints  SKIN: No jaundice, no erythema, no rashes  HEMATOLOGIC: No bleeding, no bruising  NEURO: Alert and interactive, normal gait    Labs/Imaging/Procedures:     Patient's pertinent labs and imaging were reviewed and discussed with patient today. Lab Results   Component Value Date    WBC 4.7 04/01/2022    RBC 4.67 04/01/2022    HGB 14.9 04/01/2022    HCT 46.6 04/01/2022    MCV 99.8 04/01/2022    MCH 31.9 04/01/2022    MCHC 32.0 04/01/2022    RDW 13.0 04/01/2022    .0 04/01/2022    MPV 11.4 (H) 08/29/2016        Lab Results   Component Value Date     (H) 04/01/2022    BUN 14 04/01/2022    BUNCREA 10.1 04/01/2022    CREATSERUM 1.39 (H) 04/01/2022    ANIONGAP 4 04/01/2022    GFR 59 (L) 05/09/2015    GFRNAA 57 (L) 04/01/2022    GFRAA 65 04/01/2022    CA 9.2 04/01/2022    OSMOCALC 291 04/01/2022    ALKPHO 67 09/02/2022    AST 29 09/22/2022    ALT 33 09/02/2022    ALKPHOS 66 09/20/2016    BILT 0.4 09/02/2022    TP 7.9 09/02/2022    ALB 3.5 09/02/2022    GLOBULIN 3.9 04/01/2022    AGRATIO 1.2 09/20/2016     04/01/2022    K 4.1 04/01/2022     04/01/2022    CO2 28.0 04/01/2022        No results found. .  ASSESSMENT/PLAN:   Bambi Up is a 65year old year-old male with history of  history of colon polyps, internal hemorrhoids, PE (2009), trigeminal neuralgia, who presents for abdominal pain. #abdominal pain  Pain began about 4 months ago. Epigastric region. Constant. He notes increased use of NSAIDS with hip and low back pain.  Has not tried PPI or H2 blocker for pain at this time. Discussed possible NSAID induced gastritis vs ulcer or esophagitis. Start PPI treatment then follow up in 2 months. #crc screening  Due spring 2024. Plan to hold off on cln. Recommendations:  -start taking pantoprazole twice a day for 14 days  -then switch to once a day for the rest of the month and for another month  -follow up with me in 2 months    Orders This Visit:  No orders of the defined types were placed in this encounter. Meds This Visit:  Requested Prescriptions     Signed Prescriptions Disp Refills    pantoprazole 40 MG Oral Tab EC 90 tablet 0     Sig: Take 1 tablet (40 mg total) by mouth every morning before breakfast.       Imaging & Referrals:  None      Soas Bowers PA-C   7/11/2023        This note was partially prepared using [employer] [facility] [employer] voice recognition dictation software. As a result, errors may occur. When identified, these errors have been corrected.  While every attempt is made to correct errors during dictation, discrepancies may still exist.

## 2023-07-11 NOTE — PATIENT INSTRUCTIONS
Recommendations:  -start taking pantoprazole twice a day for 14 days  -then switch to once a day for the rest of the month and for another month  -follow up with me in 2 months

## 2023-07-13 ENCOUNTER — OFFICE VISIT (OUTPATIENT)
Dept: FAMILY MEDICINE CLINIC | Facility: CLINIC | Age: 58
End: 2023-07-13

## 2023-07-13 VITALS
BODY MASS INDEX: 29.93 KG/M2 | WEIGHT: 221 LBS | TEMPERATURE: 98 F | DIASTOLIC BLOOD PRESSURE: 73 MMHG | SYSTOLIC BLOOD PRESSURE: 107 MMHG | HEART RATE: 67 BPM | HEIGHT: 72 IN

## 2023-07-13 DIAGNOSIS — M48.061 SPINAL STENOSIS OF LUMBAR REGION, UNSPECIFIED WHETHER NEUROGENIC CLAUDICATION PRESENT: Primary | ICD-10-CM

## 2023-07-13 DIAGNOSIS — M48.061 FORAMINAL STENOSIS OF LUMBAR REGION: ICD-10-CM

## 2023-07-13 DIAGNOSIS — M54.16 LUMBAR BACK PAIN WITH RADICULOPATHY AFFECTING RIGHT LOWER EXTREMITY: ICD-10-CM

## 2023-07-13 PROCEDURE — 90750 HZV VACC RECOMBINANT IM: CPT | Performed by: FAMILY MEDICINE

## 2023-07-13 PROCEDURE — 90471 IMMUNIZATION ADMIN: CPT | Performed by: FAMILY MEDICINE

## 2023-07-13 PROCEDURE — 3078F DIAST BP <80 MM HG: CPT | Performed by: FAMILY MEDICINE

## 2023-07-13 PROCEDURE — 3008F BODY MASS INDEX DOCD: CPT | Performed by: FAMILY MEDICINE

## 2023-07-13 PROCEDURE — 3074F SYST BP LT 130 MM HG: CPT | Performed by: FAMILY MEDICINE

## 2023-07-13 PROCEDURE — 99214 OFFICE O/P EST MOD 30 MIN: CPT | Performed by: FAMILY MEDICINE

## 2023-07-13 RX ORDER — PREDNISONE 10 MG/1
TABLET ORAL
Qty: 50 TABLET | Refills: 0 | Status: SHIPPED | OUTPATIENT
Start: 2023-07-13

## 2023-07-13 NOTE — PATIENT INSTRUCTIONS
Tapering dosage of steroid over 3 weeks. Return to physiatry for possible steroid injection. Continue with PT. Recommend weight loss via daily exercising and consistent healthy dietary changes.

## 2023-08-03 ENCOUNTER — APPOINTMENT (OUTPATIENT)
Dept: CT IMAGING | Facility: HOSPITAL | Age: 58
End: 2023-08-03
Attending: NURSE PRACTITIONER
Payer: COMMERCIAL

## 2023-08-03 ENCOUNTER — TELEPHONE (OUTPATIENT)
Dept: FAMILY MEDICINE CLINIC | Facility: CLINIC | Age: 58
End: 2023-08-03

## 2023-08-03 ENCOUNTER — HOSPITAL ENCOUNTER (EMERGENCY)
Facility: HOSPITAL | Age: 58
Discharge: HOME | End: 2023-08-03
Payer: COMMERCIAL

## 2023-08-03 VITALS
OXYGEN SATURATION: 97 % | BODY MASS INDEX: 29.47 KG/M2 | DIASTOLIC BLOOD PRESSURE: 88 MMHG | HEART RATE: 97 BPM | RESPIRATION RATE: 16 BRPM | HEIGHT: 72.44 IN | SYSTOLIC BLOOD PRESSURE: 129 MMHG | TEMPERATURE: 97 F | WEIGHT: 220 LBS

## 2023-08-03 DIAGNOSIS — R10.9 ABDOMINAL PAIN, ACUTE: Primary | ICD-10-CM

## 2023-08-03 LAB
ALBUMIN SERPL-MCNC: 3.5 G/DL (ref 3.4–5)
ALBUMIN/GLOB SERPL: 0.9 {RATIO} (ref 1–2)
ALP LIVER SERPL-CCNC: 66 U/L
ALT SERPL-CCNC: 29 U/L
ANION GAP SERPL CALC-SCNC: 6 MMOL/L (ref 0–18)
AST SERPL-CCNC: 16 U/L (ref 15–37)
BASOPHILS # BLD AUTO: 0.01 X10(3) UL (ref 0–0.2)
BASOPHILS NFR BLD AUTO: 0.1 %
BILIRUB SERPL-MCNC: 0.4 MG/DL (ref 0.1–2)
BILIRUB UR QL: NEGATIVE
BUN BLD-MCNC: 16 MG/DL (ref 7–18)
BUN/CREAT SERPL: 11.7 (ref 10–20)
CALCIUM BLD-MCNC: 9.2 MG/DL (ref 8.5–10.1)
CHLORIDE SERPL-SCNC: 111 MMOL/L (ref 98–112)
CLARITY UR: CLEAR
CO2 SERPL-SCNC: 25 MMOL/L (ref 21–32)
CREAT BLD-MCNC: 1.37 MG/DL
DEPRECATED RDW RBC AUTO: 44.1 FL (ref 35.1–46.3)
EGFRCR SERPLBLD CKD-EPI 2021: 61 ML/MIN/1.73M2 (ref 60–?)
EOSINOPHIL # BLD AUTO: 0.07 X10(3) UL (ref 0–0.7)
EOSINOPHIL NFR BLD AUTO: 1 %
ERYTHROCYTE [DISTWIDTH] IN BLOOD BY AUTOMATED COUNT: 12.3 % (ref 11–15)
GLOBULIN PLAS-MCNC: 3.8 G/DL (ref 2.8–4.4)
GLUCOSE BLD-MCNC: 113 MG/DL (ref 70–99)
GLUCOSE UR-MCNC: NORMAL MG/DL
HCT VFR BLD AUTO: 43.4 %
HGB BLD-MCNC: 14.7 G/DL
HGB UR QL STRIP.AUTO: NEGATIVE
IMM GRANULOCYTES # BLD AUTO: 0.01 X10(3) UL (ref 0–1)
IMM GRANULOCYTES NFR BLD: 0.1 %
KETONES UR-MCNC: NEGATIVE MG/DL
LEUKOCYTE ESTERASE UR QL STRIP.AUTO: NEGATIVE
LYMPHOCYTES # BLD AUTO: 2.18 X10(3) UL (ref 1–4)
LYMPHOCYTES NFR BLD AUTO: 32.4 %
MCH RBC QN AUTO: 32.5 PG (ref 26–34)
MCHC RBC AUTO-ENTMCNC: 33.9 G/DL (ref 31–37)
MCV RBC AUTO: 96 FL
MONOCYTES # BLD AUTO: 0.69 X10(3) UL (ref 0.1–1)
MONOCYTES NFR BLD AUTO: 10.3 %
NEUTROPHILS # BLD AUTO: 3.76 X10 (3) UL (ref 1.5–7.7)
NEUTROPHILS # BLD AUTO: 3.76 X10(3) UL (ref 1.5–7.7)
NEUTROPHILS NFR BLD AUTO: 56.1 %
NITRITE UR QL STRIP.AUTO: NEGATIVE
OSMOLALITY SERPL CALC.SUM OF ELEC: 296 MOSM/KG (ref 275–295)
PH UR: 6.5 [PH] (ref 5–8)
PLATELET # BLD AUTO: 147 10(3)UL (ref 150–450)
POTASSIUM SERPL-SCNC: 4 MMOL/L (ref 3.5–5.1)
PROT SERPL-MCNC: 7.3 G/DL (ref 6.4–8.2)
PROT UR-MCNC: NEGATIVE MG/DL
RBC # BLD AUTO: 4.52 X10(6)UL
SODIUM SERPL-SCNC: 142 MMOL/L (ref 136–145)
SP GR UR STRIP: 1.02 (ref 1–1.03)
UROBILINOGEN UR STRIP-ACNC: NORMAL
WBC # BLD AUTO: 6.7 X10(3) UL (ref 4–11)

## 2023-08-03 PROCEDURE — 80053 COMPREHEN METABOLIC PANEL: CPT

## 2023-08-03 PROCEDURE — 99284 EMERGENCY DEPT VISIT MOD MDM: CPT

## 2023-08-03 PROCEDURE — 36415 COLL VENOUS BLD VENIPUNCTURE: CPT

## 2023-08-03 PROCEDURE — 74177 CT ABD & PELVIS W/CONTRAST: CPT | Performed by: NURSE PRACTITIONER

## 2023-08-03 PROCEDURE — 85025 COMPLETE CBC W/AUTO DIFF WBC: CPT

## 2023-08-03 RX ORDER — DICYCLOMINE HCL 20 MG
20 TABLET ORAL 4 TIMES DAILY PRN
Qty: 30 TABLET | Refills: 0 | Status: SHIPPED | OUTPATIENT
Start: 2023-08-03 | End: 2023-09-02

## 2023-08-03 NOTE — TELEPHONE ENCOUNTER
Patient seen in the office on 7/13/23 for pain to the back and buttocks. He also recently had a visit with GI on 7/11/23 with PA for complaints of chronic left upper abdominal pain that is intermittent and feels like a stabbing pain below the left ribs. He was prescribed Pantoprazole and had a follow up scheduled for 9/7/23. No nausea/ vomiting and per patient this worsens if he eat's spicy food. Advised to avoid alcohol, spicy, citris, caffeine and to make sure to take the medication prescribed by GI on empty stomach in the morning with plenty of water. Advised to contact GI to see if he can be seen sooner or can be on a \"wait list.\" He will contact GI. Advised should this pain become worse or constant to go to the ER. He agreed to this plan.

## 2023-08-03 NOTE — ED INITIAL ASSESSMENT (HPI)
Pt c/o mid lower abdomen and bilateral lower abdomen pain for the past 2 weeks. Pt states has been getting worse. Pt denies n/v/d. Pt denies fevers. Pt denies urinary s&s. Last BM today. Denies pain with palpation.

## 2023-08-24 ENCOUNTER — TELEPHONE (OUTPATIENT)
Dept: PHYSICAL MEDICINE AND REHAB | Facility: CLINIC | Age: 58
End: 2023-08-24

## 2023-08-24 ENCOUNTER — OFFICE VISIT (OUTPATIENT)
Dept: PHYSICAL MEDICINE AND REHAB | Facility: CLINIC | Age: 58
End: 2023-08-24
Payer: COMMERCIAL

## 2023-08-24 VITALS
HEIGHT: 72 IN | DIASTOLIC BLOOD PRESSURE: 68 MMHG | WEIGHT: 220 LBS | OXYGEN SATURATION: 98 % | HEART RATE: 71 BPM | SYSTOLIC BLOOD PRESSURE: 122 MMHG | BODY MASS INDEX: 29.8 KG/M2

## 2023-08-24 DIAGNOSIS — M47.816 LUMBAR FACET ARTHROPATHY: Primary | ICD-10-CM

## 2023-08-24 PROCEDURE — 3078F DIAST BP <80 MM HG: CPT | Performed by: PHYSICAL MEDICINE & REHABILITATION

## 2023-08-24 PROCEDURE — 99214 OFFICE O/P EST MOD 30 MIN: CPT | Performed by: PHYSICAL MEDICINE & REHABILITATION

## 2023-08-24 PROCEDURE — 3008F BODY MASS INDEX DOCD: CPT | Performed by: PHYSICAL MEDICINE & REHABILITATION

## 2023-08-24 PROCEDURE — 3074F SYST BP LT 130 MM HG: CPT | Performed by: PHYSICAL MEDICINE & REHABILITATION

## 2023-08-24 NOTE — TELEPHONE ENCOUNTER
Per Cindy Deacon to two levels, either unilateral or bilateral, are allowed per session per spine region.    Reviewed w/ Dr. Odessia Burkitt who states he will perform Bilateral L4-5 and L5-S1 facet injections    Initiated authorization for Bilateral L4-5, L5-S1 Facet joint injections CPT 55828-24, 63116H5 dx:M47.816 to be done at [facility] with [facility]  Status: Approved w/ order #[id number] valid 8/28/23-9/26/23

## 2023-09-11 ENCOUNTER — APPOINTMENT (OUTPATIENT)
Dept: SURGERY | Facility: CLINIC | Age: 58
End: 2023-09-11
Payer: COMMERCIAL

## 2023-10-11 ENCOUNTER — OFFICE VISIT (OUTPATIENT)
Dept: PHYSICAL MEDICINE AND REHAB | Facility: CLINIC | Age: 58
End: 2023-10-11

## 2023-10-11 ENCOUNTER — HOSPITAL ENCOUNTER (OUTPATIENT)
Dept: GENERAL RADIOLOGY | Age: 58
Discharge: HOME | End: 2023-10-11
Attending: PHYSICAL MEDICINE & REHABILITATION
Payer: COMMERCIAL

## 2023-10-11 VITALS — WEIGHT: 225 LBS | HEIGHT: 72 IN | BODY MASS INDEX: 30.48 KG/M2

## 2023-10-11 DIAGNOSIS — M16.11 PRIMARY OSTEOARTHRITIS OF RIGHT HIP: ICD-10-CM

## 2023-10-11 DIAGNOSIS — M54.16 RIGHT LUMBAR RADICULOPATHY: Primary | ICD-10-CM

## 2023-10-11 PROCEDURE — 99214 OFFICE O/P EST MOD 30 MIN: CPT | Performed by: PHYSICAL MEDICINE & REHABILITATION

## 2023-10-11 PROCEDURE — 73502 X-RAY EXAM HIP UNI 2-3 VIEWS: CPT | Performed by: PHYSICAL MEDICINE & REHABILITATION

## 2023-10-11 RX ORDER — MELOXICAM 15 MG/1
15 TABLET ORAL DAILY
Qty: 14 TABLET | Refills: 0 | Status: SHIPPED | OUTPATIENT
Start: 2023-10-11 | End: 2023-10-25

## 2023-10-11 RX ORDER — GABAPENTIN 300 MG/1
CAPSULE ORAL
Qty: 90 CAPSULE | Refills: 0 | Status: SHIPPED | OUTPATIENT
Start: 2023-10-11

## 2023-10-11 NOTE — PATIENT INSTRUCTIONS
-Xray of the hip today  -MRI of the hip and follow up after  -Mobic daily for 7 days  -Gabapentin 300mg at nighttime and slowly increase to 3x daily

## 2023-10-18 ENCOUNTER — TELEPHONE (OUTPATIENT)
Dept: PHYSICAL MEDICINE AND REHAB | Facility: CLINIC | Age: 58
End: 2023-10-18

## 2023-10-18 DIAGNOSIS — M54.16 LUMBAR RADICULOPATHY: Primary | ICD-10-CM

## 2023-10-18 NOTE — TELEPHONE ENCOUNTER
Initiated authorization for Right L3 and L4 Transforaminal Epidural Steroid Injection under fluoroscopy CPT 52411, 29654 dx:M54.16 to be done at [facility] with [facility]  Status: Approved w/ order #[id number] valid 10/23/23-11/21/23

## 2023-10-20 NOTE — TELEPHONE ENCOUNTER
Patient has been scheduled for Right L3 and L4 Transforaminal Epidural Steroid Injection under fluoroscopy  on 10/30/2023 at the [address] [place] [address] with .   -Anesthesia type: local.  -If scheduling [address] [place] [address] covid testing required for all procedures whether patient is vaccinated or not. -Patient informed not to eat or drink anything after midnight the night prior to the procedure, if being sedated. (For afternoon injections: Patient to fast 6-8 hours prior to procedure with IVCS/MAC. )  -Patient was advised that if he/she does receive the covid vaccine it needs to be at least 2 weeks before or after the injection. -Medications and allergies reviewed. -Patient reminded to hold NSAIDs (Ibuprofen, ASA 81, Aleve, Naproxen, Mobic, Diclofenac, Etodolac, Celebrex etc.) for 3 days prior to [facility]  if BMI is greater than 35. For Cervical injections only hold multivitamins, Vitamin E, Fish Oil, Phentermine (Lomaira) for 7 days prior to injection and NSAIDS.  mg to be held for 7 days prior to injections.  -If patient is receiving MAC/IVCS Phentermine Eladio Bush), Adlyxin (Lixisenatide), Bydureon BCise (Exenatide-release), Byetta (Exenatide), Mounjaro (Tirezepatide), Ozempic (Semaglutide), Rybelsus (oral demaglutide), Saxenda (Liraglutide), Trulicity (Dulaglutide), Victoriza (Liraglutide), Wegovy (Semaglutide), Berberine (oral natures ozempic) will need to be held for 7 days prior to injection.  -If patient's BMI is greater than 50. Patient is unable to get IVCS/MAC at [address] [place], [place] [address] . (Options: Patient can go to [address] [place] [address] under MAC or ENDO under IVCS-reminder physician's slots at ENDO are limited. OR Patient can have the procedure done under local anesthesia at [address] [place] [address] with Valium ( per physician's preference.)    -If on blood thinner clearance has been received to hold this medication by provider.   -Patient informed he/she will need a  to and from procedure.    -[facility] is located in the [facility] 1st floor. Patient may park in the yellow or purple parking.   Patient verbalized understanding and agrees with plan.  -----> Scheduled in Epic: Yes  -----> Scheduled in [employer]/Surgical Case Request: Yes

## 2023-10-30 ENCOUNTER — APPOINTMENT (OUTPATIENT)
Dept: SURGERY | Facility: CLINIC | Age: 58
End: 2023-10-30

## 2023-11-14 ENCOUNTER — OFFICE VISIT (OUTPATIENT)
Dept: FAMILY MEDICINE CLINIC | Facility: CLINIC | Age: 58
End: 2023-11-14

## 2023-11-14 ENCOUNTER — LAB ENCOUNTER (OUTPATIENT)
Dept: LAB | Age: 58
End: 2023-11-14
Attending: FAMILY MEDICINE
Payer: COMMERCIAL

## 2023-11-14 VITALS
HEART RATE: 88 BPM | HEIGHT: 72 IN | BODY MASS INDEX: 30.34 KG/M2 | SYSTOLIC BLOOD PRESSURE: 124 MMHG | WEIGHT: 224 LBS | DIASTOLIC BLOOD PRESSURE: 82 MMHG

## 2023-11-14 DIAGNOSIS — Z00.00 ROUTINE PHYSICAL EXAMINATION: ICD-10-CM

## 2023-11-14 DIAGNOSIS — R10.31 INGUINAL PAIN, RIGHT: ICD-10-CM

## 2023-11-14 DIAGNOSIS — M16.11 PRIMARY OSTEOARTHRITIS OF RIGHT HIP: ICD-10-CM

## 2023-11-14 DIAGNOSIS — Z23 NEED FOR INFLUENZA VACCINATION: Primary | ICD-10-CM

## 2023-11-14 LAB
ALBUMIN SERPL-MCNC: 4.3 G/DL (ref 3.2–4.8)
ALBUMIN/GLOB SERPL: 1.3 {RATIO} (ref 1–2)
ALP LIVER SERPL-CCNC: 83 U/L
ALT SERPL-CCNC: 22 U/L
ANION GAP SERPL CALC-SCNC: 6 MMOL/L (ref 0–18)
AST SERPL-CCNC: 20 U/L (ref ?–34)
BASOPHILS # BLD AUTO: 0.04 X10(3) UL (ref 0–0.2)
BASOPHILS NFR BLD AUTO: 0.7 %
BILIRUB SERPL-MCNC: 0.4 MG/DL (ref 0.3–1.2)
BILIRUB UR QL: NEGATIVE
BUN BLD-MCNC: 13 MG/DL (ref 9–23)
BUN/CREAT SERPL: 9.8 (ref 10–20)
CALCIUM BLD-MCNC: 9.5 MG/DL (ref 8.7–10.4)
CHLORIDE SERPL-SCNC: 103 MMOL/L (ref 98–112)
CHOLEST SERPL-MCNC: 172 MG/DL (ref ?–200)
CLARITY UR: CLEAR
CO2 SERPL-SCNC: 28 MMOL/L (ref 21–32)
COMPLEXED PSA SERPL-MCNC: 1.3 NG/ML (ref ?–4)
COMPLEXED PSA SERPL-MCNC: 1.76 NG/ML (ref ?–4)
CREAT BLD-MCNC: 1.32 MG/DL
DEPRECATED RDW RBC AUTO: 47.1 FL (ref 35.1–46.3)
EGFRCR SERPLBLD CKD-EPI 2021: 63 ML/MIN/1.73M2 (ref 60–?)
EOSINOPHIL # BLD AUTO: 0.1 X10(3) UL (ref 0–0.7)
EOSINOPHIL NFR BLD AUTO: 1.8 %
ERYTHROCYTE [DISTWIDTH] IN BLOOD BY AUTOMATED COUNT: 13.1 % (ref 11–15)
FASTING PATIENT LIPID ANSWER: NO
FASTING STATUS PATIENT QL REPORTED: NO
GLOBULIN PLAS-MCNC: 3.2 G/DL (ref 2.8–4.4)
GLUCOSE BLD-MCNC: 129 MG/DL (ref 70–99)
GLUCOSE UR-MCNC: NORMAL MG/DL
HCT VFR BLD AUTO: 44.9 %
HDLC SERPL-MCNC: 46 MG/DL (ref 40–59)
HGB BLD-MCNC: 14.6 G/DL
HGB UR QL STRIP.AUTO: NEGATIVE
IMM GRANULOCYTES # BLD AUTO: 0.02 X10(3) UL (ref 0–1)
IMM GRANULOCYTES NFR BLD: 0.4 %
KETONES UR-MCNC: NEGATIVE MG/DL
LDLC SERPL CALC-MCNC: 113 MG/DL (ref ?–100)
LEUKOCYTE ESTERASE UR QL STRIP.AUTO: NEGATIVE
LYMPHOCYTES # BLD AUTO: 1.77 X10(3) UL (ref 1–4)
LYMPHOCYTES NFR BLD AUTO: 31.1 %
MCH RBC QN AUTO: 32 PG (ref 26–34)
MCHC RBC AUTO-ENTMCNC: 32.5 G/DL (ref 31–37)
MCV RBC AUTO: 98.5 FL
MONOCYTES # BLD AUTO: 0.57 X10(3) UL (ref 0.1–1)
MONOCYTES NFR BLD AUTO: 10 %
NEUTROPHILS # BLD AUTO: 3.19 X10 (3) UL (ref 1.5–7.7)
NEUTROPHILS # BLD AUTO: 3.19 X10(3) UL (ref 1.5–7.7)
NEUTROPHILS NFR BLD AUTO: 56 %
NITRITE UR QL STRIP.AUTO: NEGATIVE
NONHDLC SERPL-MCNC: 126 MG/DL (ref ?–130)
OSMOLALITY SERPL CALC.SUM OF ELEC: 286 MOSM/KG (ref 275–295)
PH UR: 6.5 [PH] (ref 5–8)
PLATELET # BLD AUTO: 158 10(3)UL (ref 150–450)
POTASSIUM SERPL-SCNC: 4.1 MMOL/L (ref 3.5–5.1)
PROT SERPL-MCNC: 7.5 G/DL (ref 5.7–8.2)
PROT UR-MCNC: NEGATIVE MG/DL
RBC # BLD AUTO: 4.56 X10(6)UL
SODIUM SERPL-SCNC: 137 MMOL/L (ref 136–145)
SP GR UR STRIP: 1.02 (ref 1–1.03)
TRIGL SERPL-MCNC: 70 MG/DL (ref 30–149)
TSI SER-ACNC: 1.22 MIU/ML (ref 0.55–4.78)
UROBILINOGEN UR STRIP-ACNC: NORMAL
VLDLC SERPL CALC-MCNC: 12 MG/DL (ref 0–30)
WBC # BLD AUTO: 5.7 X10(3) UL (ref 4–11)

## 2023-11-14 PROCEDURE — 81003 URINALYSIS AUTO W/O SCOPE: CPT | Performed by: FAMILY MEDICINE

## 2023-11-14 PROCEDURE — 85025 COMPLETE CBC W/AUTO DIFF WBC: CPT | Performed by: FAMILY MEDICINE

## 2023-11-14 PROCEDURE — 80061 LIPID PANEL: CPT | Performed by: FAMILY MEDICINE

## 2023-11-14 PROCEDURE — 80053 COMPREHEN METABOLIC PANEL: CPT | Performed by: FAMILY MEDICINE

## 2023-11-14 PROCEDURE — 84443 ASSAY THYROID STIM HORMONE: CPT | Performed by: FAMILY MEDICINE

## 2023-11-14 NOTE — PATIENT INSTRUCTIONS
All adult screening ordered and done appropriate for patient's age and gender and risk factors and complaints. Medication reviewed and renewed where needed and appropriate. Comply with medications. Monitor blood pressures and record at home. Limit salt intake. Recommend weight loss via daily exercising and consistent healthy dietary changes. Encouraged physical fitness and daily physical activity daily. We will consider prescription for Naprosyn concerning the patient's right hip disorder/pain after we see the updated creatinine and GFR which was drawn on today which is Tuesday, November 14, 2023.

## 2023-11-18 NOTE — PROGRESS NOTES
Subjective:     Patient ID: Adrian Garay is a 63year old male. This patient is a 18-year-old -American gentleman here for complete preventive care physical and for status update on any confirmed chronic medical illnesses and follow up on any previous labs or procedures that were suggestive or in need of further work up. Colonoscopy is current. Bowel, bladder, and sexual functions are intact. Patient complains of right inguinal discomfort with a known diagnosis of notable advanced right hip primary osteoarthritis. The patient has been seen by orthopedic specialist and has been advised in the past that this may require surgery. Patient's pain in the inguinal region is likely referred from right hip. Patient consents to recommended seasonal influenza vaccine. History/Other:   Review of Systems  Current Outpatient Medications   Medication Sig Dispense Refill    Cholecalciferol (VITAMIN D) 50 MCG (2000 UT) Oral Cap Take by mouth. Ascorbic Acid (VITAMIN C) 100 MG Oral Tab Take 1 tablet (100 mg total) by mouth daily. gabapentin 300 MG Oral Cap Start with night time dose only. If well tolerated, increase to two times daily. If well tolerated, increase to three times daily. 90 capsule 0    Multiple Vitamins-Minerals (MULTI FOR HIM 50+) Oral Tab Take by mouth. ibuprofen 100 MG Oral Tab Take 100 mg by mouth every 6 (six) hours as needed for Fever.        Allergies:No Known Allergies    Past Medical History:   Diagnosis Date    Pulmonary embolism ([address] 2009    Sleep apnea     CPAP    Sleep apnea, obstructive 09/2019    mild      Past Surgical History:   Procedure Laterality Date    COLONOSCOPY      COLONOSCOPY      COLONOSCOPY N/A 11/20/2019    Procedure: COLONOSCOPY;  Surgeon: Palmer Cheney MD;  Location: [facility] ENDOSCOPY    SHOULDER SURG PROC UNLISTED Left       Family History   Problem Relation Age of Onset    Cancer Father 67        colon cancer      Social History:   Social History     Socioeconomic History    Marital status:    Tobacco Use    Smoking status: Never    Smokeless tobacco: Never   Vaping Use    Vaping Use: Never used   Substance and Sexual Activity    Alcohol use: Yes     Alcohol/week: 2.0 standard drinks of alcohol     Types: 2 Cans of beer per week     Comment: on ocassion     Drug use: No   Other Topics Concern    Caffeine Concern No     Comment: tea    Exercise Yes        Objective:   Vitals:    11/14/23 1154   BP: 124/82   Pulse:        Physical Exam  Constitutional:       General: He is not in acute distress. Appearance: Normal appearance. He is not ill-appearing. HENT:      Head: Normocephalic and atraumatic. Right Ear: Tympanic membrane normal.      Left Ear: Tympanic membrane normal.      Nose: Nose normal.      Mouth/Throat:      Mouth: Mucous membranes are moist.   Neck:      Thyroid: No thyromegaly. Cardiovascular:      Rate and Rhythm: Normal rate and regular rhythm. Heart sounds:      No gallop. Pulmonary:      Effort: Pulmonary effort is normal.      Breath sounds: Normal breath sounds. Abdominal:      General: Bowel sounds are normal.      Palpations: Abdomen is soft. There is no mass. Musculoskeletal:      Right hip: Tenderness present. Decreased range of motion. Legs:       Comments: Pain in right hip with internal rotation. Region of discomfort as depicted. Neurological:      Mental Status: He is alert and oriented to person, place, and time. Psychiatric:         Mood and Affect: Mood normal.         Assessment & Plan:   1. Routine physical examination  General well exam and the following labs have been ordered. - CBC With Differential With Platelet; Future  - Comp Metabolic Panel (14); Future  - Lipid Panel; Future  - Urinalysis, Routine; Future  - PSA Total, Screen;  Future  - TSH W Reflex To Free T4; Future  - CBC With Differential With Platelet  - Comp Metabolic Panel (14)  - Lipid Panel  - Urinalysis, Routine  - PSA Total, Screen  - TSH W Reflex To Free T4    2. Need for influenza vaccination  Ordered and administered on today. - Fluzone Quadrivalent 6mo+ 0.5mL    3. Inguinal pain, right  Likely referred from right hip. 4. Primary osteoarthritis of right hip  Patient is currently under the care and observation of orthopedic specialist.      Orders Placed This Encounter   Procedures    CBC With Differential With Platelet    Comp Metabolic Panel (14)    Lipid Panel    Urinalysis, Routine    PSA Total, Screen    TSH W Reflex To Free T4    Fluzone Quadrivalent 6mo+ 0.5mL       Meds This Visit:  Requested Prescriptions      No prescriptions requested or ordered in this encounter       Imaging & Referrals:  INFLUENZA VACCINE, QUAD, PRESERVATIVE FREE, 0.5 ML     Patient Instructions   All adult screening ordered and done appropriate for patient's age and gender and risk factors and complaints. Medication reviewed and renewed where needed and appropriate. Comply with medications. Monitor blood pressures and record at home. Limit salt intake. Recommend weight loss via daily exercising and consistent healthy dietary changes. Encouraged physical fitness and daily physical activity daily. We will consider prescription for Naprosyn concerning the patient's right hip disorder/pain after we see the updated creatinine and GFR which was drawn on today which is Tuesday, November 14, 2023. Return if symptoms worsen or fail to improve.

## 2023-11-19 ENCOUNTER — HOSPITAL ENCOUNTER (OUTPATIENT)
Dept: MRI IMAGING | Facility: HOSPITAL | Age: 58
Discharge: HOME | End: 2023-11-19
Attending: PHYSICAL MEDICINE & REHABILITATION
Payer: COMMERCIAL

## 2023-11-19 ENCOUNTER — HOSPITAL ENCOUNTER (OUTPATIENT)
Dept: MRI IMAGING | Facility: HOSPITAL | Age: 58
End: 2023-11-19
Attending: PHYSICAL MEDICINE & REHABILITATION
Payer: COMMERCIAL

## 2023-11-19 DIAGNOSIS — R73.09 ELEVATED RANDOM BLOOD GLUCOSE LEVEL: Primary | ICD-10-CM

## 2023-11-19 DIAGNOSIS — M16.11 PRIMARY OSTEOARTHRITIS OF RIGHT HIP: ICD-10-CM

## 2023-11-19 PROCEDURE — 73721 MRI JNT OF LWR EXTRE W/O DYE: CPT | Performed by: PHYSICAL MEDICINE & REHABILITATION

## 2023-11-20 ENCOUNTER — TELEPHONE (OUTPATIENT)
Dept: FAMILY MEDICINE CLINIC | Facility: CLINIC | Age: 58
End: 2023-11-20

## 2023-11-20 NOTE — TELEPHONE ENCOUNTER
Patient called asking to go over lab results. Patient advised of 's notes and verbalized understanding. Your labs have been reviewed and are found to be within normal limits with the exception of a slightly elevated blood sugar. I will try to send an additional test to confirm that you do not have diabetes. Also you have a continuation of a mile decrease in kidney function, but this is stable.    Written by Monika Ortiz DO on 11/19/2023  3:00 PM CS

## 2023-12-07 ENCOUNTER — TELEPHONE (OUTPATIENT)
Dept: FAMILY MEDICINE CLINIC | Facility: CLINIC | Age: 58
End: 2023-12-07

## 2023-12-07 NOTE — TELEPHONE ENCOUNTER
Patient calling to request pre-operative visit for hip surgery, surgery is scheduled on 01/29/24, stated was told to have pre-operative visit between 12/30/23-01/16/24, no appointment available until Feb. Please advise.

## 2023-12-09 ENCOUNTER — MED REC SCAN ONLY (OUTPATIENT)
Dept: FAMILY MEDICINE CLINIC | Facility: CLINIC | Age: 58
End: 2023-12-09

## 2023-12-13 ENCOUNTER — OFFICE VISIT (OUTPATIENT)
Dept: PHYSICAL MEDICINE AND REHAB | Facility: CLINIC | Age: 58
End: 2023-12-13
Payer: COMMERCIAL

## 2023-12-13 VITALS
WEIGHT: 224 LBS | RESPIRATION RATE: 18 BRPM | OXYGEN SATURATION: 96 % | SYSTOLIC BLOOD PRESSURE: 136 MMHG | HEART RATE: 67 BPM | HEIGHT: 72 IN | BODY MASS INDEX: 30.34 KG/M2 | DIASTOLIC BLOOD PRESSURE: 84 MMHG

## 2023-12-13 DIAGNOSIS — G89.29 CHRONIC BILATERAL LOW BACK PAIN WITHOUT SCIATICA: ICD-10-CM

## 2023-12-13 DIAGNOSIS — M54.50 CHRONIC BILATERAL LOW BACK PAIN WITHOUT SCIATICA: ICD-10-CM

## 2023-12-13 DIAGNOSIS — M54.16 LUMBAR RADICULOPATHY: ICD-10-CM

## 2023-12-13 DIAGNOSIS — M16.11 PRIMARY OSTEOARTHRITIS OF RIGHT HIP: Primary | ICD-10-CM

## 2023-12-13 PROCEDURE — 3079F DIAST BP 80-89 MM HG: CPT | Performed by: PHYSICAL MEDICINE & REHABILITATION

## 2023-12-13 PROCEDURE — 3075F SYST BP GE 130 - 139MM HG: CPT | Performed by: PHYSICAL MEDICINE & REHABILITATION

## 2023-12-13 PROCEDURE — 99214 OFFICE O/P EST MOD 30 MIN: CPT | Performed by: PHYSICAL MEDICINE & REHABILITATION

## 2023-12-13 PROCEDURE — 3008F BODY MASS INDEX DOCD: CPT | Performed by: PHYSICAL MEDICINE & REHABILITATION

## 2023-12-13 RX ORDER — TRAMADOL HYDROCHLORIDE 50 MG/1
50 TABLET ORAL EVERY 8 HOURS PRN
Qty: 30 TABLET | Refills: 0 | Status: SHIPPED | OUTPATIENT
Start: 2023-12-13

## 2023-12-13 NOTE — PATIENT INSTRUCTIONS
-Follow up in 3 months in office  Crutches to offload the right hip to be started today  Tramadol 50 mg as needed for pain control  See orthopedic surgery

## 2024-01-02 ENCOUNTER — OFFICE VISIT (OUTPATIENT)
Dept: FAMILY MEDICINE CLINIC | Facility: CLINIC | Age: 59
End: 2024-01-02

## 2024-01-02 VITALS
WEIGHT: 238 LBS | DIASTOLIC BLOOD PRESSURE: 79 MMHG | TEMPERATURE: 98 F | BODY MASS INDEX: 32.23 KG/M2 | HEIGHT: 72 IN | SYSTOLIC BLOOD PRESSURE: 125 MMHG | HEART RATE: 84 BPM

## 2024-01-02 DIAGNOSIS — Z01.818 PRE-OPERATIVE CLEARANCE: ICD-10-CM

## 2024-01-02 DIAGNOSIS — Z01.818 PRE-OP TESTING: Primary | ICD-10-CM

## 2024-01-02 DIAGNOSIS — M16.11 PRIMARY OSTEOARTHRITIS OF RIGHT HIP: ICD-10-CM

## 2024-01-02 LAB
ATRIAL RATE: 74 BPM
P AXIS: 33 DEGREES
P-R INTERVAL: 152 MS
Q-T INTERVAL: 388 MS
QRS DURATION: 86 MS
QTC CALCULATION (BEZET): 430 MS
R AXIS: 5 DEGREES
T AXIS: 2 DEGREES
VENTRICULAR RATE: 74 BPM

## 2024-01-02 PROCEDURE — 3008F BODY MASS INDEX DOCD: CPT | Performed by: FAMILY MEDICINE

## 2024-01-02 PROCEDURE — 93000 ELECTROCARDIOGRAM COMPLETE: CPT | Performed by: FAMILY MEDICINE

## 2024-01-02 PROCEDURE — 3078F DIAST BP <80 MM HG: CPT | Performed by: FAMILY MEDICINE

## 2024-01-02 PROCEDURE — 99214 OFFICE O/P EST MOD 30 MIN: CPT | Performed by: FAMILY MEDICINE

## 2024-01-02 PROCEDURE — 3074F SYST BP LT 130 MM HG: CPT | Performed by: FAMILY MEDICINE

## 2024-01-02 NOTE — PATIENT INSTRUCTIONS
All preoperative labs have been ordered including a MRSA nasal screen.  Preoperative test will be reviewed and will determine patient's medical clearance for upcoming right hip replacement.

## 2024-01-02 NOTE — PROGRESS NOTES
Subjective:     Patient ID: Niko Mast is a 58 year old male.    This 58-year-old -American gentleman is here today for a pre-op medical clearance for total hip replacement secondary to advanced degenerative osteoarthritis of right hip joint on 02/01/24 with Dr. Landon. (Fax: [phone]).    Currently denies upper respiratory symptoms and no constitutional complaints consistent with systemic viral illness.    Denies headaches, chest pain, dizziness, shortness of breath, visual changes, and/or exertional fatigue.    Patient was sent for medical clearance without any specific instructions on what orthopedic surgeon desired for the clearance.  We will proceed with routine EKG as well as preoperative labs which include a urinalysis, MRSA nasal culture, CBC, BMP and INR.        History/Other:   Review of Systems  Current Outpatient Medications   Medication Sig Dispense Refill    Multiple Vitamins-Minerals (MULTI FOR HIM 50+) Oral Tab Take by mouth.      Cholecalciferol (VITAMIN D) 50 MCG (2000 UT) Oral Cap Take by mouth.      Ascorbic Acid (VITAMIN C) 100 MG Oral Tab Take 1 tablet (100 mg total) by mouth daily.      gabapentin 300 MG Oral Cap Start with night time dose only. If well tolerated, increase to two times daily. If well tolerated, increase to three times daily. 90 capsule 0    ibuprofen 100 MG Oral Tab Take 1 tablet (100 mg total) by mouth every 6 (six) hours as needed for Fever.      traMADol 50 MG Oral Tab Take 1 tablet (50 mg total) by mouth every 8 (eight) hours as needed for Pain. (Patient not taking: Reported on 1/2/2024) 30 tablet 0     Allergies:No Known Allergies    Past Medical History:   Diagnosis Date    Anxiety     Carotid stenosis 05/5/2024    Pulmonary embolism (HCC) 2009    Sleep apnea     CPAP    Sleep apnea, obstructive 09/2019    mild      Past Surgical History:   Procedure Laterality Date    COLONOSCOPY      COLONOSCOPY      COLONOSCOPY N/A 11/20/2019    Procedure: COLONOSCOPY;   Surgeon: Johann Coelho MD;  Location: [facility] ENDOSCOPY    SHOULDER SURG PROC UNLISTED Left       Family History   Problem Relation Age of Onset    Cancer Father 72        colon cancer      Social History:   Social History     Socioeconomic History    Marital status:    Tobacco Use    Smoking status: Never    Smokeless tobacco: Never   Vaping Use    Vaping Use: Never used   Substance and Sexual Activity    Alcohol use: Yes     Alcohol/week: 2.0 standard drinks of alcohol     Types: 2 Glasses of wine per week     Comment: on ocassion     Drug use: No   Other Topics Concern    Caffeine Concern No     Comment: tea    Exercise Yes        Objective:   Vitals:    01/02/24 1613   BP: 125/79   Pulse: 84   Temp: 97.5 °F (36.4 °C)       Physical Exam  Constitutional:       General: He is not in acute distress.     Appearance: Normal appearance. He is not ill-appearing.   HENT:      Head: Normocephalic and atraumatic.      Right Ear: Tympanic membrane normal.      Left Ear: Tympanic membrane normal.      Nose: Nose normal.      Mouth/Throat:      Mouth: Mucous membranes are moist.   Neck:      Thyroid: No thyromegaly.   Cardiovascular:      Rate and Rhythm: Normal rate and regular rhythm.      Heart sounds:      No gallop.   Pulmonary:      Effort: Pulmonary effort is normal.      Breath sounds: Normal breath sounds.   Musculoskeletal:      Right hip: Tenderness present. Decreased range of motion.        Legs:       Comments: Region of discomfort and decreased range of motion-subjective.   Neurological:      General: No focal deficit present.      Mental Status: He is alert and oriented to person, place, and time.   Psychiatric:         Mood and Affect: Mood normal.         Assessment & Plan:   1. Primary osteoarthritis of right hip  Patient here for medical clearance for total right hip replacement.  Clearance pending the results of all preoperative tests that have been ordered on today.    2. Pre-op testing  The  following has been ordered.  - EKG In-Office [99499]; EKG read as normal.  Normal sinus rate and rhythm.  No Q waves.  No ST-T wave changes.  - CBC With Differential With Platelet; Future  - Basic Metabolic Panel (8) [E]; Future  - Urinalysis, Routine; Future  - Prothrombin Time (PT) [E]; Future  - MRSA Culture Only [E]; Future  - MRSA Culture Only [E]    3. Pre-operative clearance  Pending the above results at and cleared.      Orders Placed This Encounter   Procedures    CBC With Differential With Platelet    Basic Metabolic Panel (8) [E]    Urinalysis, Routine    Prothrombin Time (PT) [E]    MRSA Culture Only [E]       Meds This Visit:  Requested Prescriptions      No prescriptions requested or ordered in this encounter       Imaging & Referrals:  ELECTROCARDIOGRAM, COMPLETE     Patient Instructions   All preoperative labs have been ordered including a MRSA nasal screen.  Preoperative test will be reviewed and will determine patient's medical clearance for upcoming right hip replacement.    Return if symptoms worsen or fail to improve.

## 2024-01-04 ENCOUNTER — LAB ENCOUNTER (OUTPATIENT)
Dept: LAB | Age: 59
End: 2024-01-04
Attending: FAMILY MEDICINE
Payer: COMMERCIAL

## 2024-01-04 DIAGNOSIS — R73.09 ELEVATED RANDOM BLOOD GLUCOSE LEVEL: ICD-10-CM

## 2024-01-04 DIAGNOSIS — Z01.818 PRE-OP TESTING: ICD-10-CM

## 2024-01-04 LAB
ANION GAP SERPL CALC-SCNC: 5 MMOL/L (ref 0–18)
BASOPHILS # BLD AUTO: 0.05 X10(3) UL (ref 0–0.2)
BASOPHILS NFR BLD AUTO: 0.8 %
BILIRUB UR QL: NEGATIVE
BUN BLD-MCNC: 14 MG/DL (ref 9–23)
BUN/CREAT SERPL: 10.6 (ref 10–20)
CALCIUM BLD-MCNC: 9.4 MG/DL (ref 8.7–10.4)
CHLORIDE SERPL-SCNC: 105 MMOL/L (ref 98–112)
CLARITY UR: CLEAR
CO2 SERPL-SCNC: 29 MMOL/L (ref 21–32)
COLOR UR: YELLOW
CREAT BLD-MCNC: 1.32 MG/DL
DEPRECATED RDW RBC AUTO: 42.1 FL (ref 35.1–46.3)
EGFRCR SERPLBLD CKD-EPI 2021: 63 ML/MIN/1.73M2 (ref 60–?)
EOSINOPHIL # BLD AUTO: 0.11 X10(3) UL (ref 0–0.7)
EOSINOPHIL NFR BLD AUTO: 1.7 %
ERYTHROCYTE [DISTWIDTH] IN BLOOD BY AUTOMATED COUNT: 12 % (ref 11–15)
EST. AVERAGE GLUCOSE BLD GHB EST-MCNC: 169 MG/DL (ref 68–126)
FASTING STATUS PATIENT QL REPORTED: NO
GLUCOSE BLD-MCNC: 170 MG/DL (ref 70–99)
GLUCOSE UR-MCNC: NORMAL MG/DL
HBA1C MFR BLD: 7.5 % (ref ?–5.7)
HCT VFR BLD AUTO: 42.7 %
HGB BLD-MCNC: 14.6 G/DL
HGB UR QL STRIP.AUTO: NEGATIVE
IMM GRANULOCYTES # BLD AUTO: 0.01 X10(3) UL (ref 0–1)
IMM GRANULOCYTES NFR BLD: 0.2 %
INR BLD: 1 (ref 0.8–1.2)
KETONES UR-MCNC: NEGATIVE MG/DL
LEUKOCYTE ESTERASE UR QL STRIP.AUTO: NEGATIVE
LYMPHOCYTES # BLD AUTO: 2.82 X10(3) UL (ref 1–4)
LYMPHOCYTES NFR BLD AUTO: 44.4 %
MCH RBC QN AUTO: 32.7 PG (ref 26–34)
MCHC RBC AUTO-ENTMCNC: 34.2 G/DL (ref 31–37)
MCV RBC AUTO: 95.5 FL
MONOCYTES # BLD AUTO: 0.82 X10(3) UL (ref 0.1–1)
MONOCYTES NFR BLD AUTO: 12.9 %
NEUTROPHILS # BLD AUTO: 2.54 X10 (3) UL (ref 1.5–7.7)
NEUTROPHILS # BLD AUTO: 2.54 X10(3) UL (ref 1.5–7.7)
NEUTROPHILS NFR BLD AUTO: 40 %
NITRITE UR QL STRIP.AUTO: NEGATIVE
OSMOLALITY SERPL CALC.SUM OF ELEC: 292 MOSM/KG (ref 275–295)
PH UR: 6.5 [PH] (ref 5–8)
PLATELET # BLD AUTO: 174 10(3)UL (ref 150–450)
POTASSIUM SERPL-SCNC: 4.2 MMOL/L (ref 3.5–5.1)
PROTHROMBIN TIME: 13.8 SECONDS (ref 11.6–14.8)
RBC # BLD AUTO: 4.47 X10(6)UL
SODIUM SERPL-SCNC: 139 MMOL/L (ref 136–145)
SP GR UR STRIP: 1.03 (ref 1–1.03)
UROBILINOGEN UR STRIP-ACNC: 4
WBC # BLD AUTO: 6.4 X10(3) UL (ref 4–11)

## 2024-01-04 PROCEDURE — 81003 URINALYSIS AUTO W/O SCOPE: CPT

## 2024-01-04 PROCEDURE — 80048 BASIC METABOLIC PNL TOTAL CA: CPT

## 2024-01-04 PROCEDURE — 3051F HG A1C>EQUAL 7.0%<8.0%: CPT | Performed by: FAMILY MEDICINE

## 2024-01-04 PROCEDURE — 83036 HEMOGLOBIN GLYCOSYLATED A1C: CPT

## 2024-01-04 PROCEDURE — 85025 COMPLETE CBC W/AUTO DIFF WBC: CPT

## 2024-01-04 PROCEDURE — 85610 PROTHROMBIN TIME: CPT

## 2024-01-04 PROCEDURE — 36415 COLL VENOUS BLD VENIPUNCTURE: CPT

## 2024-01-16 ENCOUNTER — TELEPHONE (OUTPATIENT)
Dept: FAMILY MEDICINE CLINIC | Facility: CLINIC | Age: 59
End: 2024-01-16

## 2024-01-16 NOTE — TELEPHONE ENCOUNTER
Larissa, states that the patient is scheduled to have surgery on 2-1-24 and they would like pre op clearance, labs and EKG. Pleases, fax to [phone].

## 2024-01-17 ENCOUNTER — TELEPHONE (OUTPATIENT)
Dept: FAMILY MEDICINE CLINIC | Facility: CLINIC | Age: 59
End: 2024-01-17

## 2024-01-17 DIAGNOSIS — E11.9 NEW ONSET TYPE 2 DIABETES MELLITUS (HCC): Primary | ICD-10-CM

## 2024-01-17 NOTE — TELEPHONE ENCOUNTER
Larissa from Dr. Washington's office to obtain Pre-op clearance. Larissa stated she did receive a fax yesterday, however, no adendum as listed and do clearance, stated paperwork only states Pending.     Fax # [phone]

## 2024-01-19 NOTE — TELEPHONE ENCOUNTER
Dr. Means, can you addend 1/2/24 office note to include statement that patient is cleared for surgery?  Thanks!

## 2024-01-20 NOTE — TELEPHONE ENCOUNTER
Patient appears to have developed diabetes.  Although it is not poorly controlled or significant enough to stop the surgery, we will try to get at least 1 visit and with endocrinology, prior to his surgery.  Referral has been placed on the chart.  I will send a staff note to Dr. Ceballos or the endocrine nurse practitioner to see if they can get this patient and during the week of January 22, 2024 regarding this newly diagnosed diabetes.    Also metformin 500 mg has been sent to the pharmacy to be taken twice a day.  And I have sent to the patient's pharmacy a prescription for a glucometer and other paraphernalia needed for home testing.

## 2024-01-22 NOTE — TELEPHONE ENCOUNTER
If the patient cannot be seen by endocrinology before this calendar week is out, please have the patient to see me on Friday January to 26 2024 at 12:40 PM p.m. please instruct him to make sure that he is taking the medication for his blood sugar.  Thank you.

## 2024-01-22 NOTE — TELEPHONE ENCOUNTER
Called patient, confirmed name and .    Patient advised of Dr. Means's note.  Patient verbalized understanding.  Will  medication.    Dr. Means, let me know when you hear back from Dr. Ceballos.  Do you want to wait to addend the office note clearing patient for surgery until after patient see endo?

## 2024-01-23 NOTE — TELEPHONE ENCOUNTER
Advised patient of Dr Means's note. Patient verbalized understanding. He will call Endocrinology and if he cannot get in by this week will still make an appointment but also call us to be fit in with Dr. Means on 12/26/24 at 12:40 pm.      Provider Address Phone   Roula Ceballos MD [address] [place] [address], [address]  [facility] [address] [phone]

## 2024-01-26 ENCOUNTER — OFFICE VISIT (OUTPATIENT)
Dept: FAMILY MEDICINE CLINIC | Facility: CLINIC | Age: 59
End: 2024-01-26

## 2024-01-26 VITALS
BODY MASS INDEX: 31.69 KG/M2 | HEART RATE: 90 BPM | SYSTOLIC BLOOD PRESSURE: 123 MMHG | DIASTOLIC BLOOD PRESSURE: 86 MMHG | HEIGHT: 72 IN | WEIGHT: 234 LBS

## 2024-01-26 DIAGNOSIS — M16.11 PRIMARY OSTEOARTHRITIS OF RIGHT HIP: ICD-10-CM

## 2024-01-26 DIAGNOSIS — E11.9 NEW ONSET TYPE 2 DIABETES MELLITUS (HCC): Primary | ICD-10-CM

## 2024-01-26 LAB
GLUCOSE BLOOD: 146
TEST STRIP LOT #: NORMAL NUMERIC

## 2024-01-26 PROCEDURE — 3079F DIAST BP 80-89 MM HG: CPT | Performed by: FAMILY MEDICINE

## 2024-01-26 PROCEDURE — 3074F SYST BP LT 130 MM HG: CPT | Performed by: FAMILY MEDICINE

## 2024-01-26 PROCEDURE — 82947 ASSAY GLUCOSE BLOOD QUANT: CPT | Performed by: FAMILY MEDICINE

## 2024-01-26 PROCEDURE — 99214 OFFICE O/P EST MOD 30 MIN: CPT | Performed by: FAMILY MEDICINE

## 2024-01-26 PROCEDURE — 3008F BODY MASS INDEX DOCD: CPT | Performed by: FAMILY MEDICINE

## 2024-01-26 NOTE — PATIENT INSTRUCTIONS
Patient encouraged to keep diabetic navigator appointment along with endocrinology.  Patient to hold on metformin until surgery has been completed.  Accu-Chek done on today.  Patient encouraged to consume a low carbohydrate diet this weekend and increase his water intake.  Safe exercise to be continued at 150 minutes/week.  Accu-Chek and all the necessary paraphernalia prepared to be sent to the pharmacy.  Patient cleared for right hip surgery on Thursday, February 1, 2024, at the [facility].

## 2024-01-26 NOTE — PROGRESS NOTES
Subjective:     Patient ID: Niko Mast is a 58 year old male.    This patient is a 58-year-old -American male who was found to be diabetic during preoperative testing, yet asymptomatic.  Patient has already adjusted his dietary intake and has lost 4 pounds within the last 10 days.  Patient's Accu-Chek today even with light meals is at 146.  The patient did get a chance to take 2 days of the metformin but it has stopped in lieu of his upcoming surgery on Thursday, February 1, 2024..    Again, patient denies urinary frequency or visual blurring.    We will send a notification to his orthopedic specialist which officially clears him medically for his right hip replacement.    Prescription will be handed to the patient for Accu-Chek machine and all the paraphernalia necessary.  Patient has been given a referral to endocrinology along with diabetic navigator.        History/Other:   Review of Systems  Current Outpatient Medications   Medication Sig Dispense Refill    metFORMIN  MG Oral Tablet 24 Hr Take 1 tablet (500 mg total) by mouth daily. 30 tablet 0    metFORMIN 500 MG Oral Tab Take 1 tablet (500 mg total) by mouth 2 (two) times daily with meals. 60 tablet 0    traMADol 50 MG Oral Tab Take 1 tablet (50 mg total) by mouth every 8 (eight) hours as needed for Pain. 30 tablet 0    Multiple Vitamins-Minerals (MULTI FOR HIM 50+) Oral Tab Take by mouth.      Cholecalciferol (VITAMIN D) 50 MCG (2000 UT) Oral Cap Take by mouth.      Ascorbic Acid (VITAMIN C) 100 MG Oral Tab Take 1 tablet (100 mg total) by mouth daily.      gabapentin 300 MG Oral Cap Start with night time dose only. If well tolerated, increase to two times daily. If well tolerated, increase to three times daily. 90 capsule 0    ibuprofen 100 MG Oral Tab Take 1 tablet (100 mg total) by mouth every 6 (six) hours as needed for Fever.       Allergies:No Known Allergies    Past Medical History:   Diagnosis Date    Anxiety     Carotid stenosis  05/5/2024    Pulmonary embolism (HCC) 2009    Sleep apnea     CPAP    Sleep apnea, obstructive 09/2019    mild      Past Surgical History:   Procedure Laterality Date    COLONOSCOPY      COLONOSCOPY      COLONOSCOPY N/A 11/20/2019    Procedure: COLONOSCOPY;  Surgeon: Johann Coelho MD;  Location: [facility] ENDOSCOPY    SHOULDER SURG PROC UNLISTED Left       Family History   Problem Relation Age of Onset    Cancer Father 72        colon cancer      Social History:   Social History     Socioeconomic History    Marital status:    Tobacco Use    Smoking status: Never    Smokeless tobacco: Never   Vaping Use    Vaping Use: Never used   Substance and Sexual Activity    Alcohol use: Yes     Alcohol/week: 2.0 standard drinks of alcohol     Types: 2 Glasses of wine per week     Comment: on ocassion     Drug use: No   Other Topics Concern    Caffeine Concern No     Comment: tea    Exercise Yes        Objective:   Vitals:    01/26/24 1254   BP: 123/86   Pulse: 90       Physical Exam  Constitutional:       General: He is not in acute distress.     Appearance: Normal appearance. He is not ill-appearing.   HENT:      Head: Normocephalic and atraumatic.   Cardiovascular:      Rate and Rhythm: Normal rate.      Heart sounds: Normal heart sounds.   Pulmonary:      Effort: Pulmonary effort is normal.      Breath sounds: Normal breath sounds.   Neurological:      Mental Status: He is alert.   Psychiatric:         Mood and Affect: Mood normal.         Assessment & Plan:   1. New onset type 2 diabetes mellitus (HCC)  Today's random blood sugar after light meals is 146 mg/dL.  Patient officially cleared to proceed with total hip replacement.  Orthopedic specialist to be notified.  - POC HemoCue Glucose 201 (Finger stick glucose)  - Diabetic Test Strips and Supplies    2. Primary osteoarthritis of right hip  Reason for total right hip replacement.      Orders Placed This Encounter   Procedures    POC HemoCue Glucose 201 (Finger stick  glucose)       Meds This Visit:  Requested Prescriptions      No prescriptions requested or ordered in this encounter       Imaging & Referrals:  DME DIABETIC TEST STRIPS AND SUPPLIES     Patient Instructions   Patient encouraged to keep diabetic navigator appointment along with endocrinology.  Patient to hold on metformin until surgery has been completed.  Accu-Chek done on today.  Patient encouraged to consume a low carbohydrate diet this weekend and increase his water intake.  Safe exercise to be continued at 150 minutes/week.  Accu-Chek and all the necessary paraphernalia prepared to be sent to the pharmacy.  Patient cleared for right hip surgery on Thursday, February 1, 2024, at the [facility].        Return in about 2 weeks (around 2/9/2024), or if symptoms worsen or fail to improve.

## 2024-01-30 ENCOUNTER — TELEPHONE (OUTPATIENT)
Dept: FAMILY MEDICINE CLINIC | Facility: CLINIC | Age: 59
End: 2024-01-30

## 2024-01-30 DIAGNOSIS — E11.9 NEW ONSET TYPE 2 DIABETES MELLITUS (HCC): Primary | ICD-10-CM

## 2024-01-30 NOTE — TELEPHONE ENCOUNTER
Patient requesting diabetic supplies to be sent to [facility] pharmacy.   States provider was to send supplies; however no orders placed.   Message sent to provider for further review.

## 2024-01-31 ENCOUNTER — TELEPHONE (OUTPATIENT)
Dept: FAMILY MEDICINE CLINIC | Facility: CLINIC | Age: 59
End: 2024-01-31

## 2024-01-31 NOTE — TELEPHONE ENCOUNTER
Prescription for diabetic supplies have been placed on heart even prior to today.    [place] office staff could you please print from \"other orders\" tab and fax the diabetic testing strips and supplies prescription to the patient's pharmacy?    Thank you

## 2024-01-31 NOTE — TELEPHONE ENCOUNTER
Evan from [facility] called to request pre op office not to be faxed over to them FROM 1/2     FAX: [phone]

## 2024-01-31 NOTE — TELEPHONE ENCOUNTER
Please forward office note from January 2, 2024 as well as January 26, 2024 to the  on the previous message.  Fax number has been provided.  Patient is medically cleared to proceed with right hip replacement.

## 2024-02-02 NOTE — TELEPHONE ENCOUNTER
Verified name and .    Patient states that [facility] advised that they have not received the orders.    Office staff, please kindly assist and thank you.    Patient is requesting a phone call once the orders have be faxed again.

## 2024-02-02 NOTE — TELEPHONE ENCOUNTER
Patient called, verified name/.  Asking about diabetic supplies?  Informed per note below, was faxed to [employer] 2024.  [facility]  Instructed to call back if they did not get orders.

## 2024-02-05 NOTE — TELEPHONE ENCOUNTER
Faxed again to [facility], called patient and confirmed correct pharmacy, patient will call to check if they received, if not he has appointmetn Friday with us and will  copy.

## 2024-02-09 ENCOUNTER — OFFICE VISIT (OUTPATIENT)
Dept: FAMILY MEDICINE CLINIC | Facility: CLINIC | Age: 59
End: 2024-02-09
Payer: COMMERCIAL

## 2024-02-09 VITALS
SYSTOLIC BLOOD PRESSURE: 120 MMHG | WEIGHT: 232.63 LBS | DIASTOLIC BLOOD PRESSURE: 84 MMHG | HEART RATE: 110 BPM | HEIGHT: 72 IN | BODY MASS INDEX: 31.51 KG/M2 | RESPIRATION RATE: 18 BRPM

## 2024-02-09 DIAGNOSIS — Z96.641 S/P HIP REPLACEMENT, RIGHT: ICD-10-CM

## 2024-02-09 DIAGNOSIS — Z23 NEED FOR PNEUMOCOCCAL VACCINATION: Primary | ICD-10-CM

## 2024-02-09 DIAGNOSIS — E11.9 NEW ONSET TYPE 2 DIABETES MELLITUS (HCC): ICD-10-CM

## 2024-02-09 PROCEDURE — 90471 IMMUNIZATION ADMIN: CPT | Performed by: FAMILY MEDICINE

## 2024-02-09 PROCEDURE — 3074F SYST BP LT 130 MM HG: CPT | Performed by: FAMILY MEDICINE

## 2024-02-09 PROCEDURE — 99214 OFFICE O/P EST MOD 30 MIN: CPT | Performed by: FAMILY MEDICINE

## 2024-02-09 PROCEDURE — 90677 PCV20 VACCINE IM: CPT | Performed by: FAMILY MEDICINE

## 2024-02-09 PROCEDURE — 3079F DIAST BP 80-89 MM HG: CPT | Performed by: FAMILY MEDICINE

## 2024-02-09 PROCEDURE — 3008F BODY MASS INDEX DOCD: CPT | Performed by: FAMILY MEDICINE

## 2024-02-09 NOTE — PATIENT INSTRUCTIONS
OT referral generated.  Patient encouraged to continue with ibuprofen 800 mg to be taken with meals and like clockwork in order to stay out in front of some of the discomfort.  Patient able to continue with tramadol as well in between ibuprofen dosage.  Keep all orthopedic surgeon follow-ups.  Make sure that ibuprofen is not placed prior to physical therapy.

## 2024-02-13 ENCOUNTER — OFFICE VISIT (OUTPATIENT)
Dept: ENDOCRINOLOGY CLINIC | Facility: CLINIC | Age: 59
End: 2024-02-13
Payer: COMMERCIAL

## 2024-02-13 VITALS
HEIGHT: 72.01 IN | BODY MASS INDEX: 31.29 KG/M2 | SYSTOLIC BLOOD PRESSURE: 121 MMHG | HEART RATE: 102 BPM | WEIGHT: 231 LBS | DIASTOLIC BLOOD PRESSURE: 81 MMHG

## 2024-02-13 DIAGNOSIS — E11.65 UNCONTROLLED TYPE 2 DIABETES MELLITUS WITH HYPERGLYCEMIA (HCC): Primary | ICD-10-CM

## 2024-02-13 LAB
GLUCOSE BLOOD: 137
TEST STRIP LOT #: NORMAL NUMERIC

## 2024-02-13 PROCEDURE — 3074F SYST BP LT 130 MM HG: CPT

## 2024-02-13 PROCEDURE — 3079F DIAST BP 80-89 MM HG: CPT

## 2024-02-13 PROCEDURE — 82947 ASSAY GLUCOSE BLOOD QUANT: CPT

## 2024-02-13 PROCEDURE — 3008F BODY MASS INDEX DOCD: CPT

## 2024-02-13 PROCEDURE — 99204 OFFICE O/P NEW MOD 45 MIN: CPT

## 2024-02-13 RX ORDER — TIRZEPATIDE 2.5 MG/.5ML
2.5 INJECTION, SOLUTION SUBCUTANEOUS WEEKLY
Qty: 2 ML | Refills: 0 | Status: SHIPPED | OUTPATIENT
Start: 2024-02-13 | End: 2024-02-15

## 2024-02-13 RX ORDER — TIRZEPATIDE 5 MG/.5ML
5 INJECTION, SOLUTION SUBCUTANEOUS WEEKLY
Qty: 2 ML | Refills: 3 | Status: SHIPPED | OUTPATIENT
Start: 2024-03-12 | End: 2024-02-15

## 2024-02-13 RX ORDER — SIMVASTATIN 20 MG
20 TABLET ORAL NIGHTLY
Qty: 90 TABLET | Refills: 1 | Status: SHIPPED | OUTPATIENT
Start: 2024-02-13

## 2024-02-13 RX ORDER — BLOOD SUGAR DIAGNOSTIC
STRIP MISCELLANEOUS
Qty: 200 STRIP | Refills: 0 | Status: SHIPPED | OUTPATIENT
Start: 2024-02-13

## 2024-02-13 RX ORDER — BLOOD-GLUCOSE METER
1 EACH MISCELLANEOUS DAILY
Qty: 1 KIT | Refills: 0 | Status: SHIPPED | OUTPATIENT
Start: 2024-02-13

## 2024-02-13 RX ORDER — LANCETS 33 GAUGE
EACH MISCELLANEOUS
Qty: 200 EACH | Refills: 0 | Status: SHIPPED | OUTPATIENT
Start: 2024-02-13

## 2024-02-13 NOTE — PROGRESS NOTES
Name: Niko Mast  Date: 2/13/2024    Referring Physician: Dawson Means    HISTORY OF PRESENT ILLNESS   Niko Mast is a 58 year old male who presents for consult for diabetes mellitus     He was undergoing preoperative workup for Right total hip replacement surgery 1/2024 and he was found to have HgA1c of 7.5% with no prior known history of diabetes. He was started on Metformin by PCP and is tolerating medication.     Diabetes History:  Diagnosed- new diagnosis - found during preoperative work up for hip surgery.     DM FH  - mother   - cousins and uncles on mom's side     Prior glycohemoglobin were: 7.5% 1/2024    Dietary compliance: Good- he has made dietary changes since diagnosis and is following low CHO diet- avoiding breads.      Recall: Avoiding bread since diagnosis     Breakfast- egg whites and oatmeal   Lunch- salads   Dinner- fish or chicken with vegetable, fruits   Snack- grapes   Beverages- water was drinking coke zero before.     Exercise: Yes- physical therapy following hip replacement. Before hip surgery had been doing regular work outs up until just a few months prior to surgery.      Polyuria/polydipsia: No, dry mouth   Blurred vision: No    Episodes of hypoglycemia: No  Blood Glucose:    - Has not been checking- having trouble getting testing supplies.     Current DM Regimen:  Metformin ER 500mg PO daily     Modifying factors:  Medication adherence: Yes   Recent steroids, illness or infections: No, last had steroid injection in hip around 9/2023      REVIEW OF SYSTEMS  Eyes: Diabetic retinopathy present: No            Most recent visit to eye doctor in last 12 months: No    CV: Cardiovascular disease present: No         Hypertension present: No         Hyperlipidemia present: Yes         Peripheral Vascular Disease present: No    : Nephropathy present: None known     Neuro: Neuropathy present: No    Skin: Infection or ulceration: No    Osteoporosis: No    Thyroid disease:  No      Medications:     Current Outpatient Medications:     metFORMIN 500 MG Oral Tab, Take 1 tablet (500 mg total) by mouth 2 (two) times daily with meals. (Patient taking differently: Take 1 tablet (500 mg total) by mouth 2 (two) times daily with meals. Taking 1 Tablet ONCE DAILY), Disp: 60 tablet, Rfl: 0    apixaban 2.5 MG Oral Tab, Take 1 tablet (2.5 mg total) by mouth 2 (two) times daily., Disp: , Rfl:     metFORMIN  MG Oral Tablet 24 Hr, Take 1 tablet (500 mg total) by mouth daily. (Patient not taking: Reported on 2/9/2024), Disp: 30 tablet, Rfl: 0    traMADol 50 MG Oral Tab, Take 1 tablet (50 mg total) by mouth every 8 (eight) hours as needed for Pain., Disp: 30 tablet, Rfl: 0    Multiple Vitamins-Minerals (MULTI FOR HIM 50+) Oral Tab, Take by mouth., Disp: , Rfl:     Cholecalciferol (VITAMIN D) 50 MCG (2000 UT) Oral Cap, Take by mouth., Disp: , Rfl:     Ascorbic Acid (VITAMIN C) 100 MG Oral Tab, Take 1 tablet (100 mg total) by mouth daily., Disp: , Rfl:     gabapentin 300 MG Oral Cap, Start with night time dose only. If well tolerated, increase to two times daily. If well tolerated, increase to three times daily. (Patient not taking: Reported on 2/9/2024), Disp: 90 capsule, Rfl: 0    ibuprofen 100 MG Oral Tab, Take 1 tablet (100 mg total) by mouth every 6 (six) hours as needed for Fever., Disp: , Rfl:      Allergies:   No Known Allergies    Social History:   Social History     Socioeconomic History    Marital status:    Tobacco Use    Smoking status: Never    Smokeless tobacco: Never   Vaping Use    Vaping Use: Never used   Substance and Sexual Activity    Alcohol use: Yes     Alcohol/week: 2.0 standard drinks of alcohol     Types: 2 Glasses of wine per week     Comment: on ocassion     Drug use: No   Other Topics Concern    Caffeine Concern No     Comment: tea    Exercise Yes       Medical History:   Past Medical History:   Diagnosis Date    Anxiety     Carotid stenosis 05/5/2024    Pulmonary  embolism (HCC) 2009    Sleep apnea     CPAP    Sleep apnea, obstructive 09/2019    mild       Surgical history:   Past Surgical History:   Procedure Laterality Date    COLONOSCOPY      COLONOSCOPY      COLONOSCOPY N/A 11/20/2019    Procedure: COLONOSCOPY;  Surgeon: Johann Coelho MD;  Location: [facility] ENDOSCOPY    SHOULDER SURG PROC UNLISTED Left          PHYSICAL EXAM  Vitals:    02/13/24 1103   BP: 121/81   Pulse: 102   Weight: 231 lb (104.8 kg)   Height: 6' 0.01\" (1.829 m)       General Appearance:  alert, well developed, in no acute distress  Eyes:  normal conjunctivae, sclera, and normal pupils  Neck: Trachea midline: Normal  Back: no kyphosis or back tenderness  Respiratory:  clear to auscultation bilaterally  Lymph Nodes:  No abnormal nodes noted  Musculoskeletal:  normal muscle strength and tone  Skin:  normal moisture and skin texture  Hair & Nails:  normal scalp hair     Hematologic:  no excessive bruising  Neuro:  sensory grossly intact and motor grossly intact.  Psychiatric:  oriented to time, self, and place  Nutritional:  no abnormal weight gain or loss      ASSESSMENT/PLAN:    Diabetes mellitus type 2 uncontrolled   -HgA1c- 7.5%  -Reviewed new diagnosis of diabetes   -Reviewed ABC's of diabetes   - Reviewed pathogenesis of diabetes.   - Reviewed importance of good glycemic control to prevent microvascular and macrovascular complications including nephropathy, neuropathy, retinopathy, and cardiovascular disease.  - Reviewed importance of SBGM- check glucose 1 time daily   - Reviewed target glucose goals for patient  fasting and <180 (ideally <160) post prandially   - Reviewed importance of following diabetic diet- recommended 135 grams of CHO per day or 45 grams per meal.   - Provided patient education materials    -He has been having issues getting testing supplies from the pharmacy. Per chart review, maryuri Aly was awaiting medical necessity form which has since been completed.      -Reviewed option of continuing metformin or transitioning to weekly GLP-1 agonist. He is interested in trying weekly injectable medication.   -Stop Metformin     -Start Mounjaro 2.5mg subcutaneous weekly for 4 weeks and then increase to 5mg subcutaneous weekly. Reviewed side effects and risks vs benefits of medication. Reviewed pen injection and dose titration in clinic today.     - Reviewed importance of yearly optho follow up. Referral provided   - Reviewed daily foot care and foot exam  - Lipids 1/2024- LDL- 113. Reviewed importance of low fat diet and exercise. Would benefit from statin therapy- has not been on lipid lowering medication in the past.   - No known nephropathy- testing ordered       RTC in 3 months   2/13/2024  PRANAY Aquino

## 2024-02-13 NOTE — PATIENT INSTRUCTIONS
Stop Metformin     Start Mounjaro 2.5mg subcutaneous weekly for 4 weeks and then increase to 5mg subcutaneous weekly

## 2024-02-14 NOTE — PROGRESS NOTES
Subjective:     Patient ID: Niko Mast is a 58 year old male.    Patient is a 58-year-old newly diagnosed diabetic who is status post total right knee replacement here today for follow-up accompanied by his spouse.  Per the orthopedic specialist patient so far displaying progress consistent with a routine healed.  Patient able to consume food and eliminate normally.    Spouse is requesting occupational therapy so that the patient can master activities of daily living with independence as soon as possible.    Patient is set to begin physical therapy.    Pain is being managed well with pain medications recommended via the orthopedic specialist.    Patient continues to discipline his dietary intake and continues to lose weight with intention based on diet.        History/Other:   Review of Systems  Current Outpatient Medications   Medication Sig Dispense Refill    simvastatin 20 MG Oral Tab Take 1 tablet (20 mg total) by mouth nightly. 90 tablet 1    apixaban 2.5 MG Oral Tab Take 1 tablet (2.5 mg total) by mouth 2 (two) times daily.      traMADol 50 MG Oral Tab Take 1 tablet (50 mg total) by mouth every 8 (eight) hours as needed for Pain. 30 tablet 0    Multiple Vitamins-Minerals (MULTI FOR HIM 50+) Oral Tab Take by mouth.      Cholecalciferol (VITAMIN D) 50 MCG (2000 UT) Oral Cap Take by mouth.      Ascorbic Acid (VITAMIN C) 100 MG Oral Tab Take 1 tablet (100 mg total) by mouth daily.      ibuprofen 100 MG Oral Tab Take 1 tablet (100 mg total) by mouth every 6 (six) hours as needed for Fever.      Tirzepatide (MOUNJARO) 2.5 MG/0.5ML Subcutaneous Solution Pen-injector Inject 2.5 mg into the skin once a week. 2 mL 0    [START ON 3/12/2024] Tirzepatide (MOUNJARO) 5 MG/0.5ML Subcutaneous Solution Pen-injector Inject 5 mg into the skin once a week. 2 mL 3    Blood Glucose Monitoring Suppl (ONETOUCH VERIO) w/Device Does not apply Kit 1 each daily. 1 kit 0    Glucose Blood (ONETOUCH VERIO) In Vitro Strip Test 2x daily  200 strip 0    OneTouch Delica Lancets 33G Does not apply Misc Test 2 x daily 200 each 0     Allergies:No Known Allergies    Past Medical History:   Diagnosis Date    Anxiety     Carotid stenosis 05/5/2024    Pulmonary embolism (HCC) 2009    Sleep apnea     CPAP    Sleep apnea, obstructive 09/2019    mild      Past Surgical History:   Procedure Laterality Date    COLONOSCOPY      COLONOSCOPY      COLONOSCOPY N/A 11/20/2019    Procedure: COLONOSCOPY;  Surgeon: Johann Coelho MD;  Location: [facility] ENDOSCOPY    SHOULDER SURG PROC UNLISTED Left       Family History   Problem Relation Age of Onset    Cancer Father 72        colon cancer      Social History:   Social History     Socioeconomic History    Marital status:    Tobacco Use    Smoking status: Never    Smokeless tobacco: Never   Vaping Use    Vaping Use: Never used   Substance and Sexual Activity    Alcohol use: Yes     Alcohol/week: 2.0 standard drinks of alcohol     Types: 2 Glasses of wine per week     Comment: on ocassion     Drug use: No   Other Topics Concern    Caffeine Concern No     Comment: tea    Exercise Yes        Objective:   Vitals:    02/09/24 1010   BP: 120/84   Pulse: 110   Resp: 18       Physical Exam  Constitutional:       General: He is not in acute distress.     Appearance: Normal appearance. He is not ill-appearing.   HENT:      Head: Normocephalic and atraumatic.   Cardiovascular:      Rate and Rhythm: Normal rate and regular rhythm.      Heart sounds:      No gallop.   Pulmonary:      Effort: Pulmonary effort is normal.      Breath sounds: Normal breath sounds.   Musculoskeletal:      Right hip: Tenderness present.      Comments: Subjective-diminished range of motion and stiffness reported by patient.   Neurological:      Mental Status: He is alert.   Psychiatric:         Mood and Affect: Mood normal.         Assessment & Plan:   1. S/P hip replacement, right  Referred.  Keep appointments with orthopedic specialist for appropriate  follow-up and healing assessments.  - Occupational Therapy Referral - [facility]    2. Need for pneumococcal vaccination  Ordered and administered on today.  - Prevnar 20 (PCV20) [63512]    3. New onset type 2 diabetes mellitus (HCC)  Lipid-lowering agent added to diabetic treatment regimen.  - simvastatin 20 MG Oral Tab; Take 1 tablet (20 mg total) by mouth nightly.  Dispense: 90 tablet; Refill: 1      Orders Placed This Encounter   Procedures    Prevnar 20 (PCV20) [46118]       Meds This Visit:  Requested Prescriptions     Signed Prescriptions Disp Refills    simvastatin 20 MG Oral Tab 90 tablet 1     Sig: Take 1 tablet (20 mg total) by mouth nightly.       Imaging & Referrals:  PCV20 VACCINE FOR INTRAMUSCULAR USE  OCCUPATIONAL THERAPY - INTERNAL     Patient Instructions   OT referral generated.  Patient encouraged to continue with ibuprofen 800 mg to be taken with meals and like clockwork in order to stay out in front of some of the discomfort.  Patient able to continue with tramadol as well in between ibuprofen dosage.  Keep all orthopedic surgeon follow-ups.  Make sure that ibuprofen is not placed prior to physical therapy.    Return in about 6 weeks (around 3/22/2024), or if symptoms worsen or fail to improve.

## 2024-02-15 ENCOUNTER — TELEPHONE (OUTPATIENT)
Dept: FAMILY MEDICINE CLINIC | Facility: CLINIC | Age: 59
End: 2024-02-15

## 2024-02-15 RX ORDER — TIRZEPATIDE 2.5 MG/.5ML
2.5 INJECTION, SOLUTION SUBCUTANEOUS WEEKLY
Qty: 2 ML | Refills: 0 | Status: SHIPPED | OUTPATIENT
Start: 2024-02-15

## 2024-02-15 RX ORDER — TIRZEPATIDE 5 MG/.5ML
5 INJECTION, SOLUTION SUBCUTANEOUS WEEKLY
Qty: 2 ML | Refills: 3 | Status: SHIPPED | OUTPATIENT
Start: 2024-03-12

## 2024-02-15 NOTE — TELEPHONE ENCOUNTER
Patient called office. Date of birth and full name both confirmed.   Asking Diabetes Rx to be transferred, but it was sent by Berna Pierre, Shahram Aly cannot get him his prescription.   Wants to use [facility].     RN updated [facility] pharmacy on file     Provided number for Endo and transferred patient to [facility] to request.

## 2024-02-15 NOTE — TELEPHONE ENCOUNTER
LOV: 2-13-24  RTC: 3 mths  F/U: 5-13-24       Noted was sent on 2-13 to [facility]. Pt requesting to go to [facility]. Pended.

## 2024-02-15 NOTE — TELEPHONE ENCOUNTER
Pt called to request that this RX be sent to [facility].    Please call.      Current Outpatient Medications:     Tirzepatide (MOUNJARO) 2.5 MG/0.5ML Subcutaneous Solution Pen-injector, Inject 2.5 mg into the skin once a week., Disp: 2 mL, Rfl: 0    [START ON 3/12/2024] Tirzepatide (MOUNJARO) 5 MG/0.5ML Subcutaneous Solution Pen-injector, Inject 5 mg into the skin once a week., Disp: 2 mL, Rfl: 3

## 2024-02-20 ENCOUNTER — TELEPHONE (OUTPATIENT)
Dept: ENDOCRINOLOGY CLINIC | Facility: CLINIC | Age: 59
End: 2024-02-20

## 2024-02-20 NOTE — TELEPHONE ENCOUNTER
Pt states pharmacy told him that Dr needs to contact ins co for Mounjaro rx - he was unable to  rx

## 2024-02-20 NOTE — TELEPHONE ENCOUNTER
Patient called back to inform that a prior auth is needed for the Mounjaro medication. Patient states that it has been a couple of weeks now trying to get the medication.

## 2024-02-21 NOTE — TELEPHONE ENCOUNTER
Per Lauren:  mounjaro approved: this request is approved from 02/20/2024 to 02/20/2027.    Spoke to patient to advise mounjaro approved until 2/20/2027 - patient stated understanding and will contact clinic if there are any issues with pharmacy

## 2024-02-23 ENCOUNTER — TELEPHONE (OUTPATIENT)
Dept: ENDOCRINOLOGY CLINIC | Facility: CLINIC | Age: 59
End: 2024-02-23

## 2024-02-23 NOTE — TELEPHONE ENCOUNTER
Pt states that the pharmacy is out of [employer] 2.5 but has the 5 mg in stock.  He is wanting to know if he can start that dose now.  Please call

## 2024-02-24 DIAGNOSIS — E11.9 NEW ONSET TYPE 2 DIABETES MELLITUS (HCC): ICD-10-CM

## 2024-02-25 RX ORDER — TIRZEPATIDE 5 MG/.5ML
5 INJECTION, SOLUTION SUBCUTANEOUS WEEKLY
Qty: 2 ML | Refills: 3 | Status: SHIPPED | OUTPATIENT
Start: 2024-03-12

## 2024-02-25 NOTE — TELEPHONE ENCOUNTER
Yes, plan was to complete four weeks of 2.5mg dose and then increase to 5mg dose. I have sent prescription. Thanks!

## 2024-02-26 DIAGNOSIS — E11.9 NEW ONSET TYPE 2 DIABETES MELLITUS (HCC): ICD-10-CM

## 2024-02-26 NOTE — TELEPHONE ENCOUNTER
Please review. Protocol failed / No Protocol.    Requested Prescriptions   Pending Prescriptions Disp Refills    METFORMIN 500 MG Oral Tab [Pharmacy Med Name: METFORMIN 500MG TABLETS] 60 tablet 0     Sig: TAKE 1 TABLET(500 MG) BY MOUTH TWICE DAILY WITH MEALS       Diabetes Medication Protocol Failed - 2/24/2024 10:07 AM        Failed - Microalbumin procedure in past 12 months or taking ACE/ARB        Passed - Last A1C < 7.5 and within past 6 months     Lab Results   Component Value Date    A1C 7.5 (H) 01/04/2024             Passed - In person appointment or virtual visit in the past 6 mos or appointment in next 3 mos     Recent Outpatient Visits              1 week ago Uncontrolled type 2 diabetes mellitus with hyperglycemia (HCC)    [facility] Berna Pierre APRN    Office Visit    2 weeks ago Need for pneumococcal vaccination    [facility] Dawson Means DO    Office Visit    1 month ago New onset type 2 diabetes mellitus (HCC)    [facility] Dawson Means,     Office Visit    1 month ago Pre-op testing    [facility] Dawson Means,     Office Visit    2 months ago Primary osteoarthritis of right hip    [facility] [place] Patel, Yogen Y, DO    Office Visit          Future Appointments         Provider Department Appt Notes    In 2 months Dawson Means DO [facility] 3 mos f/u    In 2 months Berna Pierre APRN [facility] 3 month f/u    In 8 months Dawson Means DO [facility] AW[facility]               Passed - EGFRCR or GFRAA > 50     GFR Evaluation  EGFRCR: 63 , resulted on 1/4/2024          Passed - GFR in the past 12 months

## 2024-02-27 RX ORDER — METFORMIN HYDROCHLORIDE 500 MG/1
500 TABLET, EXTENDED RELEASE ORAL DAILY
Qty: 30 TABLET | Refills: 0 | OUTPATIENT
Start: 2024-02-27

## 2024-02-27 NOTE — TELEPHONE ENCOUNTER
MARY LOU  Per [facility] tech, patient picked up Mounjaro 5 mg on 2/23.   Per patient he has not started in hopes that he could still get the 2.5 mg. Patient wanted to verify that the 5 mg dose was ok to start on.   This RN confirmed to patient that per Berna PIRES see below note of 2/25.  This RN reviewed medication action and side effects with patient. Patient voiced understanding.

## 2024-03-05 ENCOUNTER — TELEPHONE (OUTPATIENT)
Dept: FAMILY MEDICINE CLINIC | Facility: CLINIC | Age: 59
End: 2024-03-05

## 2024-03-05 NOTE — TELEPHONE ENCOUNTER
Dr. Means- patient would like to know if you can take aspirin with Mounjaro. Please advise. Thank you.

## 2024-03-06 NOTE — TELEPHONE ENCOUNTER
Attempted to call patient, mailbox full- sent SMS through  [employer] message also sent to patient--last sign in 2/8/24

## 2024-03-06 NOTE — TELEPHONE ENCOUNTER
Spoke with pt,  verified, pt stated he missed our call.   Pt was informed of MD recommendation, pt stated understanding.

## 2024-03-08 ENCOUNTER — TELEPHONE (OUTPATIENT)
Dept: FAMILY MEDICINE CLINIC | Facility: CLINIC | Age: 59
End: 2024-03-08

## 2024-03-08 RX ORDER — BLOOD SUGAR DIAGNOSTIC
STRIP MISCELLANEOUS
Qty: 200 STRIP | Refills: 3 | OUTPATIENT
Start: 2024-03-08

## 2024-03-08 RX ORDER — LANCETS 33 GAUGE
EACH MISCELLANEOUS
Qty: 200 EACH | Refills: 3 | OUTPATIENT
Start: 2024-03-08

## 2024-03-08 NOTE — TELEPHONE ENCOUNTER
Gastrointestinal side effects can occur with this class of medications.  Please advise that the patient does not continue with his injection for now.  Has the patient tried any home remedies or over-the-counter products for his constipation and bloating?  This will help me to determine what we need to try next.  Thank you.

## 2024-03-08 NOTE — TELEPHONE ENCOUNTER
Verified name and .    Patient states that he took stool softeners and fiber which helped him have bowel movement- no longer with constipation, bloating, or any other symptoms at this time.     He states he would like to continue the Mounjaro.

## 2024-03-08 NOTE — TELEPHONE ENCOUNTER
Pt started Mounjaro on Monday 3/04 states that he has not had a regular BM since Sunday (day before starting the Mounjaro). States that he feels bloated.  Please advise.

## 2024-03-11 ENCOUNTER — TELEPHONE (OUTPATIENT)
Dept: FAMILY MEDICINE CLINIC | Facility: CLINIC | Age: 59
End: 2024-03-11

## 2024-03-11 NOTE — TELEPHONE ENCOUNTER
Action Requested: Summary for Provider     []  Critical Lab, Recommendations Needed  [x] Need Additional Advice  []   FYI    []   Need Orders  [] Need Medications Sent to Pharmacy  []  Other     SUMMARY: Patient stated just started Mounjaro and is now have some numbness in feet since starting medication    He also stated did have recent surgery to hip    Advised him to call Endo/Orthro    He will call back if not related    Reason for call: Patient Question  Onset: Data Unavailable

## 2024-03-12 NOTE — TELEPHONE ENCOUNTER
Spoke with patient, CORWIN verified.   Gave him message below.  He already stopped taking it.  He said he had been taking the 5 mg dose because 2.5 mg was not avaiable.  Advised him to call back if the numbness subsides, to determine if he or Dr Means wants him to do a retrial.  Patient stated understanding.

## 2024-03-12 NOTE — TELEPHONE ENCOUNTER
There is a very good chance that this numbness is associated with patient's surgery especially if the numbness is on the same side of the surgery.  With that being said this is the second time the patient has call with a potential side effect about this particular medication.  I advised that the patient discontinue this medication and we can always restart it.  This will allow for us to know if there is a direct correlation between this patient feeling the symptoms that he has reported being caused by this medication.  Thank you.

## 2024-03-16 NOTE — TELEPHONE ENCOUNTER
LOV: 02/13/2024     Next office visit: 05/13/2024     Last filled: 02/13/2024    Order pended and routed to provider

## 2024-03-17 ENCOUNTER — HOSPITAL ENCOUNTER (EMERGENCY)
Facility: HOSPITAL | Age: 59
Discharge: HOME | End: 2024-03-17
Attending: EMERGENCY MEDICINE
Payer: COMMERCIAL

## 2024-03-17 ENCOUNTER — APPOINTMENT (OUTPATIENT)
Dept: CT IMAGING | Facility: HOSPITAL | Age: 59
End: 2024-03-17
Attending: EMERGENCY MEDICINE
Payer: COMMERCIAL

## 2024-03-17 VITALS
BODY MASS INDEX: 31 KG/M2 | OXYGEN SATURATION: 98 % | RESPIRATION RATE: 18 BRPM | SYSTOLIC BLOOD PRESSURE: 119 MMHG | TEMPERATURE: 99 F | DIASTOLIC BLOOD PRESSURE: 77 MMHG | HEART RATE: 65 BPM | WEIGHT: 232 LBS

## 2024-03-17 DIAGNOSIS — R10.9 ABDOMINAL PAIN OF UNKNOWN ETIOLOGY: Primary | ICD-10-CM

## 2024-03-17 DIAGNOSIS — K59.00 CONSTIPATION, UNSPECIFIED CONSTIPATION TYPE: ICD-10-CM

## 2024-03-17 LAB
ALBUMIN SERPL-MCNC: 4.4 G/DL (ref 3.2–4.8)
ALP LIVER SERPL-CCNC: 64 U/L
ALT SERPL-CCNC: 19 U/L
ANION GAP SERPL CALC-SCNC: 5 MMOL/L (ref 0–18)
AST SERPL-CCNC: 27 U/L (ref ?–34)
BASOPHILS # BLD AUTO: 0.03 X10(3) UL (ref 0–0.2)
BASOPHILS NFR BLD AUTO: 0.7 %
BILIRUB DIRECT SERPL-MCNC: 0.2 MG/DL (ref ?–0.3)
BILIRUB SERPL-MCNC: 0.4 MG/DL (ref 0.3–1.2)
BILIRUB UR QL: NEGATIVE
BUN BLD-MCNC: 14 MG/DL (ref 9–23)
BUN/CREAT SERPL: 10.9 (ref 10–20)
CALCIUM BLD-MCNC: 9.6 MG/DL (ref 8.7–10.4)
CHLORIDE SERPL-SCNC: 107 MMOL/L (ref 98–112)
CLARITY UR: CLEAR
CO2 SERPL-SCNC: 26 MMOL/L (ref 21–32)
CREAT BLD-MCNC: 1.28 MG/DL
DEPRECATED RDW RBC AUTO: 43.5 FL (ref 35.1–46.3)
EGFRCR SERPLBLD CKD-EPI 2021: 65 ML/MIN/1.73M2 (ref 60–?)
EOSINOPHIL # BLD AUTO: 0.1 X10(3) UL (ref 0–0.7)
EOSINOPHIL NFR BLD AUTO: 2.4 %
ERYTHROCYTE [DISTWIDTH] IN BLOOD BY AUTOMATED COUNT: 12.5 % (ref 11–15)
GLUCOSE BLD-MCNC: 105 MG/DL (ref 70–99)
GLUCOSE UR-MCNC: NORMAL MG/DL
HCT VFR BLD AUTO: 40.1 %
HGB BLD-MCNC: 13.2 G/DL
HGB UR QL STRIP.AUTO: NEGATIVE
IMM GRANULOCYTES # BLD AUTO: 0.01 X10(3) UL (ref 0–1)
IMM GRANULOCYTES NFR BLD: 0.2 %
KETONES UR-MCNC: NEGATIVE MG/DL
LEUKOCYTE ESTERASE UR QL STRIP.AUTO: NEGATIVE
LIPASE SERPL-CCNC: 32 U/L (ref 13–75)
LYMPHOCYTES # BLD AUTO: 2 X10(3) UL (ref 1–4)
LYMPHOCYTES NFR BLD AUTO: 48.5 %
MCH RBC QN AUTO: 30.8 PG (ref 26–34)
MCHC RBC AUTO-ENTMCNC: 32.9 G/DL (ref 31–37)
MCV RBC AUTO: 93.5 FL
MONOCYTES # BLD AUTO: 0.48 X10(3) UL (ref 0.1–1)
MONOCYTES NFR BLD AUTO: 11.7 %
NEUTROPHILS # BLD AUTO: 1.5 X10 (3) UL (ref 1.5–7.7)
NEUTROPHILS # BLD AUTO: 1.5 X10(3) UL (ref 1.5–7.7)
NEUTROPHILS NFR BLD AUTO: 36.5 %
NITRITE UR QL STRIP.AUTO: NEGATIVE
OSMOLALITY SERPL CALC.SUM OF ELEC: 287 MOSM/KG (ref 275–295)
PH UR: 6 [PH] (ref 5–8)
PLATELET # BLD AUTO: 182 10(3)UL (ref 150–450)
POTASSIUM SERPL-SCNC: 3.9 MMOL/L (ref 3.5–5.1)
PROT SERPL-MCNC: 7.7 G/DL (ref 5.7–8.2)
PROT UR-MCNC: NEGATIVE MG/DL
RBC # BLD AUTO: 4.29 X10(6)UL
SODIUM SERPL-SCNC: 138 MMOL/L (ref 136–145)
SP GR UR STRIP: 1.02 (ref 1–1.03)
UROBILINOGEN UR STRIP-ACNC: NORMAL
WBC # BLD AUTO: 4.1 X10(3) UL (ref 4–11)

## 2024-03-17 PROCEDURE — 80076 HEPATIC FUNCTION PANEL: CPT | Performed by: EMERGENCY MEDICINE

## 2024-03-17 PROCEDURE — 36415 COLL VENOUS BLD VENIPUNCTURE: CPT

## 2024-03-17 PROCEDURE — 81003 URINALYSIS AUTO W/O SCOPE: CPT | Performed by: EMERGENCY MEDICINE

## 2024-03-17 PROCEDURE — 74177 CT ABD & PELVIS W/CONTRAST: CPT | Performed by: EMERGENCY MEDICINE

## 2024-03-17 PROCEDURE — 99284 EMERGENCY DEPT VISIT MOD MDM: CPT

## 2024-03-17 PROCEDURE — 85025 COMPLETE CBC W/AUTO DIFF WBC: CPT | Performed by: EMERGENCY MEDICINE

## 2024-03-17 PROCEDURE — 83690 ASSAY OF LIPASE: CPT | Performed by: EMERGENCY MEDICINE

## 2024-03-17 PROCEDURE — 80048 BASIC METABOLIC PNL TOTAL CA: CPT | Performed by: EMERGENCY MEDICINE

## 2024-03-17 NOTE — ED PROVIDER NOTES
Patient Seen in: [facility] Emergency Department      History     Chief Complaint   Patient presents with    Abdomen/Flank Pain     Stated Complaint: RUQ pain.    Subjective:   HPI    58-year-old male presents for evaluation of abdominal pain.  Patient reports right-sided abdominal pain for the past 2 weeks.  Has also been constipated for the same amount of time.  No fever, chills, nausea, vomiting, urinary complaints, focal weakness or numbness, recent trauma.    Objective:   Past Medical History:   Diagnosis Date    Anxiety     Carotid stenosis 05/5/2024    Pulmonary embolism (HCC) 2009    Sleep apnea     CPAP    Sleep apnea, obstructive 09/2019    mild              Past Surgical History:   Procedure Laterality Date    COLONOSCOPY      COLONOSCOPY      COLONOSCOPY N/A 11/20/2019    Procedure: COLONOSCOPY;  Surgeon: Johann Coelho MD;  Location: [facility] ENDOSCOPY    SHOULDER SURG PROC UNLISTED Left                 Social History     Socioeconomic History    Marital status:    Tobacco Use    Smoking status: Never    Smokeless tobacco: Never   Vaping Use    Vaping Use: Never used   Substance and Sexual Activity    Alcohol use: Yes     Alcohol/week: 2.0 standard drinks of alcohol     Types: 2 Glasses of wine per week     Comment: on ocassion     Drug use: No   Other Topics Concern    Caffeine Concern No     Comment: tea    Exercise Yes              Review of Systems    Positive for stated complaint: RUQ pain.  Other systems are as noted in HPI.  Constitutional and vital signs reviewed.      All other systems reviewed and negative except as noted above.    Physical Exam     ED Triage Vitals [03/17/24 1118]   /80   Pulse 84   Resp 18   Temp 99 °F (37.2 °C)   Temp src Temporal   SpO2 97 %   O2 Device None (Room air)       Current:/77   Pulse 65   Temp 99 °F (37.2 °C) (Temporal)   Resp 18   Wt 105.2 kg   SpO2 98%   BMI 31.46 kg/m²         Physical Exam  Vitals and nursing note reviewed.    Constitutional:       General: He is not in acute distress.     Appearance: He is well-developed.   HENT:      Head: Normocephalic and atraumatic.   Eyes:      Conjunctiva/sclera: Conjunctivae normal.   Cardiovascular:      Rate and Rhythm: Normal rate and regular rhythm.      Heart sounds: Normal heart sounds.   Pulmonary:      Effort: Pulmonary effort is normal. No respiratory distress.      Breath sounds: Normal breath sounds.   Abdominal:      General: Bowel sounds are normal.      Palpations: Abdomen is soft.      Tenderness: There is abdominal tenderness in the right lower quadrant.   Musculoskeletal:         General: Normal range of motion.      Cervical back: Normal range of motion and neck supple.   Skin:     General: Skin is warm and dry.      Findings: No rash.   Neurological:      General: No focal deficit present.      Mental Status: He is alert and oriented to person, place, and time.               ED Course     Labs Reviewed   BASIC METABOLIC PANEL (8) - Abnormal; Notable for the following components:       Result Value    Glucose 105 (*)     All other components within normal limits   CBC W/ DIFFERENTIAL - Abnormal; Notable for the following components:    RBC 4.29 (*)     All other components within normal limits   HEPATIC FUNCTION PANEL (7) - Normal   LIPASE - Normal   CBC WITH DIFFERENTIAL WITH PLATELET    Narrative:     The following orders were created for panel order CBC With Differential With Platelet.  Procedure                               Abnormality         Status                     ---------                               -----------         ------                     CBC W/ DIFFERENTIAL[[id number]]          Abnormal            Final result                 Please view results for these tests on the individual orders.   URINALYSIS, ROUTINE             CT ABDOMEN+PELVIS(CONTRAST ONLY)(CPT=74177)    Result Date: 3/17/2024  CONCLUSION:  Diffuse hepatic steatosis.  Diffuse urinary bladder  wall thickening, which may be artifactual secondary to under distension.  Acute cystitis can have the same appearance.  Correlate with urinalysis.   Imaging findings of mild mesenteric panniculitis.    Dictated by (CST): Jaqui Ohara MD on 3/17/2024 at 1:44 PM     Finalized by (CST): Jaqui Ohara MD on 3/17/2024 at 1:50 PM                  MDM                                         Medical Decision Making  Differential diagnosis includes but is not limited to appendicitis, diverticulitis, obstruction, malignancy, foodborne illness    Well-appearing patient, unrevealing labs, normal vital signs.  CT as above, denies urinary complaints.  Discussed recommendations for supportive care, MiraLAX, close outpatient follow-up and return precautions.  Patient verbalizes understanding of and agreement with this plan.    Problems Addressed:  Abdominal pain of unknown etiology: acute illness or injury  Constipation, unspecified constipation type: acute illness or injury    Amount and/or Complexity of Data Reviewed  External Data Reviewed: labs.     Details: CBC and BMP stable compared to 1/4/2024  Labs: ordered.  Radiology: ordered.        Disposition and Plan     Clinical Impression:  1. Abdominal pain of unknown etiology    2. Constipation, unspecified constipation type         Disposition:  Discharge  3/17/2024  2:17 pm    Follow-up:  Dawson Means, DO  [address] [place] [address]  [address] [place] [address] [id number] [phone]    Follow up            Medications Prescribed:  Discharge Medication List as of 3/17/2024  2:19 PM

## 2024-03-17 NOTE — DISCHARGE INSTRUCTIONS
Take 1-2 capfuls of MiraLAX every day until having a soft, regular bowel movement.    See primary care for follow-up in 1 week if symptoms have not resolved.    Return to the ER if you develop worsening symptoms or any emergent concerns.

## 2024-03-18 RX ORDER — BLOOD-GLUCOSE METER
1 EACH MISCELLANEOUS DAILY
Qty: 200 KIT | Refills: 0 | Status: SHIPPED | OUTPATIENT
Start: 2024-03-18

## 2024-03-19 RX ORDER — BLOOD-GLUCOSE METER
1 EACH MISCELLANEOUS DAILY
Qty: 200 KIT | Refills: 0 | Status: SHIPPED | OUTPATIENT
Start: 2024-03-19

## 2024-03-25 ENCOUNTER — HOSPITAL ENCOUNTER (EMERGENCY)
Facility: HOSPITAL | Age: 59
Discharge: HOME | End: 2024-03-26
Attending: EMERGENCY MEDICINE
Payer: COMMERCIAL

## 2024-03-25 ENCOUNTER — NURSE TRIAGE (OUTPATIENT)
Dept: FAMILY MEDICINE CLINIC | Facility: CLINIC | Age: 59
End: 2024-03-25

## 2024-03-25 ENCOUNTER — OFFICE VISIT (OUTPATIENT)
Dept: FAMILY MEDICINE CLINIC | Facility: CLINIC | Age: 59
End: 2024-03-25
Payer: COMMERCIAL

## 2024-03-25 VITALS
DIASTOLIC BLOOD PRESSURE: 70 MMHG | HEART RATE: 92 BPM | HEIGHT: 72 IN | WEIGHT: 220 LBS | SYSTOLIC BLOOD PRESSURE: 120 MMHG | OXYGEN SATURATION: 96 % | TEMPERATURE: 98 F | BODY MASS INDEX: 29.8 KG/M2

## 2024-03-25 DIAGNOSIS — R10.84 GENERALIZED ABDOMINAL PAIN: Primary | ICD-10-CM

## 2024-03-25 DIAGNOSIS — T18.108A FOREIGN BODY IN ESOPHAGUS, INITIAL ENCOUNTER: Primary | ICD-10-CM

## 2024-03-25 DIAGNOSIS — R12 HEART BURN: ICD-10-CM

## 2024-03-25 PROCEDURE — 3008F BODY MASS INDEX DOCD: CPT

## 2024-03-25 PROCEDURE — 99284 EMERGENCY DEPT VISIT MOD MDM: CPT

## 2024-03-25 PROCEDURE — 3078F DIAST BP <80 MM HG: CPT

## 2024-03-25 PROCEDURE — 3074F SYST BP LT 130 MM HG: CPT

## 2024-03-25 PROCEDURE — 99214 OFFICE O/P EST MOD 30 MIN: CPT

## 2024-03-25 RX ORDER — BRIMONIDINE TARTRATE 2 MG/ML
SOLUTION/ DROPS OPHTHALMIC
COMMUNITY
Start: 2024-03-13

## 2024-03-25 RX ORDER — PANTOPRAZOLE SODIUM 20 MG/1
20 TABLET, DELAYED RELEASE ORAL
Qty: 90 TABLET | Refills: 0 | Status: SHIPPED | OUTPATIENT
Start: 2024-03-25

## 2024-03-25 RX ORDER — LATANOPROST 50 UG/ML
SOLUTION/ DROPS OPHTHALMIC
COMMUNITY
Start: 2024-03-07

## 2024-03-25 NOTE — TELEPHONE ENCOUNTER
Action Requested: Summary for Provider     []  Critical Lab, Recommendations Needed  [] Need Additional Advice  [x]   FYI    []   Need Orders  [] Need Medications Sent to Pharmacy  []  Other     SUMMARY: ED on 3/17/24; Upper-bilateral abdominal pain that is intermittent, that persists, especially after eating, even without eating any spicy foods [pain is now 3/10]. Same day office visit offered --> agreeable and scheduled. Refer to system/assessment protocol for yes/no answers. [See below for more details]     Reason for call: Abdominal Pain  Onset: 3/3/24    Reason for Disposition   MILD TO MODERATE pain that comes and goes (cramps) lasts > 24 hours    Protocols used: Abdominal Pain - Upper-A-OH      Patient called states he went to ED on 3/17/24 for abdominal pain that is intermittent. His upper bilateral abdominal pain has persisted; it is present after eating --> now pain is 3/10. He took some Lina-lax, which helps a little bit. His constipation has resolved. Denies any shortness of breath, chest pain, and/or chest tightness. Denies any bloody or tarry stool. Last BM was this morning --> formed and brown. Denies any nausea/vomiting/fever. Same/next day office visit advised and same day visit offered --> agreeable and scheduled. Home Care Advice discussed, per protocol. Patient instructed any new or worsening symptoms [reviewed] seek immediate medical attention--> IC/ED. Patient verbalized understanding. No further questions or concerns at this time.    Future Appointments   Date Time Provider Department Center   3/25/2024  5:30 PM Mandy Rodríguez APRN [facility]

## 2024-03-25 NOTE — PROGRESS NOTES
Niko Mast is a 58 year old male.  Chief Complaint   Patient presents with    Hospital F/U     Here to follow up emergency room visit  03 17/24 for pain around abdomen for 3 weeks / Pt told to take miralax . Pt states it helped with constipation but abdomen \" burns\" when he eats.     HPI:   Niko Mast presented to the clinic for follow up ER  on 3/17/24 for abdominal pain.  Onset x 2 weeks. Associated constipation.  Denies nausea, vomiting, urinary complaints.  Negative urinalysis.  CT scan negative for acute findings.  Advised MiraLAX and to avoid spicy.     Today, patient with continued abdominal pain.  Intermittent.  Burning. 3/10. Worse during/ after meals. Denies nausea, vomiting, fever.  Taking MiraLAX as recommended.  Constipation has improved. Has been avoiding spicy foods.  No improvement in abdominal pain.    Current Outpatient Medications   Medication Sig Dispense Refill    brimonidine 0.2 % Ophthalmic Solution INSTILL 1 DROP IN BOTH EYE(S) TWICE DAILY      latanoprost 0.005 % Ophthalmic Solution INSTILL 1 DROP IN BOTH EYE(S) DAILY AT BEDTIME      Blood Glucose Monitoring Suppl (ONETOUCH ULTRA 2) w/Device Does not apply Kit 1 strip by In Vitro route daily. 200 kit 0    Tirzepatide (MOUNJARO) 5 MG/0.5ML Subcutaneous Solution Pen-injector Inject 5 mg into the skin once a week. 2 mL 3    Glucose Blood (ONETOUCH VERIO) In Vitro Strip Test 2x daily 200 strip 0    OneTouch Delica Lancets 33G Does not apply Misc Test 2 x daily 200 each 0    Multiple Vitamins-Minerals (MULTI FOR HIM 50+) Oral Tab Take by mouth.      Cholecalciferol (VITAMIN D) 50 MCG (2000 UT) Oral Cap Take by mouth.      Ascorbic Acid (VITAMIN C) 100 MG Oral Tab Take 1 tablet (100 mg total) by mouth daily.      simvastatin 20 MG Oral Tab Take 1 tablet (20 mg total) by mouth nightly. (Patient not taking: Reported on 3/25/2024) 90 tablet 1    apixaban 2.5 MG Oral Tab Take 1 tablet (2.5 mg total) by mouth 2 (two) times daily. (Patient  not taking: Reported on 3/25/2024)      traMADol 50 MG Oral Tab Take 1 tablet (50 mg total) by mouth every 8 (eight) hours as needed for Pain. (Patient not taking: Reported on 3/25/2024) 30 tablet 0    ibuprofen 100 MG Oral Tab Take 1 tablet (100 mg total) by mouth every 6 (six) hours as needed for Fever. (Patient not taking: Reported on 3/25/2024)        Past Medical History:   Diagnosis Date    Anxiety     Carotid stenosis 05/5/2024    Pulmonary embolism (HCC) 2009    Sleep apnea     CPAP    Sleep apnea, obstructive 09/2019    mild      Past Surgical History:   Procedure Laterality Date    COLONOSCOPY      COLONOSCOPY      COLONOSCOPY N/A 11/20/2019    Procedure: COLONOSCOPY;  Surgeon: Johann Coelho MD;  Location: [facility] ENDOSCOPY    SHOULDER SURG PROC UNLISTED Left       Social History:  Social History     Socioeconomic History    Marital status:    Tobacco Use    Smoking status: Never    Smokeless tobacco: Never   Vaping Use    Vaping Use: Never used   Substance and Sexual Activity    Alcohol use: Yes     Alcohol/week: 2.0 standard drinks of alcohol     Types: 2 Glasses of wine per week     Comment: on ocassion     Drug use: No   Other Topics Concern    Caffeine Concern No     Comment: tea    Exercise Yes      Family History   Problem Relation Age of Onset    Cancer Father 72        colon cancer      No Known Allergies     REVIEW OF SYSTEMS:   Review of Systems   Constitutional: Negative.    Respiratory: Negative.  Negative for shortness of breath.    Cardiovascular: Negative.  Negative for chest pain and palpitations.   Gastrointestinal:  Positive for abdominal distention and abdominal pain. Negative for blood in stool, constipation, diarrhea, nausea and vomiting.   Neurological: Negative.  Negative for dizziness and headaches.   Psychiatric/Behavioral: Negative.     All other systems reviewed and are negative.     Wt Readings from Last 5 Encounters:   03/25/24 220 lb (99.8 kg)   03/17/24 232 lb (105.2  kg)   02/13/24 231 lb (104.8 kg)   02/09/24 232 lb 9.6 oz (105.5 kg)   01/26/24 234 lb (106.1 kg)     Body mass index is 29.84 kg/m².      EXAM:   /70 (BP Location: Right arm, Patient Position: Sitting, Cuff Size: adult)   Pulse 92   Temp 97.5 °F (36.4 °C) (Temporal)   Ht 6' (1.829 m)   Wt 220 lb (99.8 kg)   SpO2 96%   BMI 29.84 kg/m²   Physical Exam  Vitals and nursing note reviewed.   Constitutional:       Appearance: Normal appearance.   HENT:      Head: Normocephalic and atraumatic.   Cardiovascular:      Rate and Rhythm: Normal rate and regular rhythm.      Pulses: Normal pulses.      Heart sounds: Normal heart sounds.   Pulmonary:      Effort: Pulmonary effort is normal.      Breath sounds: Normal breath sounds.   Abdominal:      General: Abdomen is flat. There is no distension.      Palpations: Abdomen is soft. There is no mass.      Tenderness: There is no abdominal tenderness. There is no guarding or rebound.      Hernia: No hernia is present.   Neurological:      General: No focal deficit present.      Mental Status: He is alert and oriented to person, place, and time.   Psychiatric:         Mood and Affect: Mood normal.         Behavior: Behavior normal.            ASSESSMENT AND PLAN:   (R10.84) Generalized abdominal pain  (primary encounter diagnosis)  (R12) Heart burn  Plan: Helicobacter Pylori Breath Test, Adult  Reviewed ER note, labs, and imaging. Patient with c/o burning during/after meals. No nausea, vomiting, constipation. Likely reflex. Script for pantoprazole to pharmacy. Advised to follow up in 6 weeks to monitor. Can take pepcid in addition as needed. Advised avoid spicy, citrus, greasy, fried food, alcohol, and caffeine. Do not lay down for 3 hours after meals. If no improvement will completed h pylori.     Follow up as needed.       The patient indicates understanding of these issues and agrees to the plan.    This note was prepared using Dragon [employer] voice recognition  dictation software. As a result errors may occur. When identified these errors have been corrected. While every attempt is made to correct errors during dictation discrepancies may still exist.

## 2024-03-26 ENCOUNTER — APPOINTMENT (OUTPATIENT)
Dept: CT IMAGING | Facility: HOSPITAL | Age: 59
End: 2024-03-26
Attending: EMERGENCY MEDICINE
Payer: COMMERCIAL

## 2024-03-26 VITALS
HEART RATE: 70 BPM | HEIGHT: 72 IN | SYSTOLIC BLOOD PRESSURE: 118 MMHG | DIASTOLIC BLOOD PRESSURE: 75 MMHG | TEMPERATURE: 98 F | OXYGEN SATURATION: 97 % | RESPIRATION RATE: 18 BRPM | BODY MASS INDEX: 29.8 KG/M2 | WEIGHT: 220 LBS

## 2024-03-26 LAB
ANION GAP SERPL CALC-SCNC: 9 MMOL/L (ref 0–18)
BASOPHILS # BLD AUTO: 0.03 X10(3) UL (ref 0–0.2)
BASOPHILS NFR BLD AUTO: 0.6 %
BUN BLD-MCNC: 16 MG/DL (ref 9–23)
BUN/CREAT SERPL: 12.7 (ref 10–20)
CALCIUM BLD-MCNC: 9.5 MG/DL (ref 8.7–10.4)
CHLORIDE SERPL-SCNC: 104 MMOL/L (ref 98–112)
CO2 SERPL-SCNC: 26 MMOL/L (ref 21–32)
CREAT BLD-MCNC: 1.26 MG/DL
DEPRECATED RDW RBC AUTO: 44.3 FL (ref 35.1–46.3)
EGFRCR SERPLBLD CKD-EPI 2021: 67 ML/MIN/1.73M2 (ref 60–?)
EOSINOPHIL # BLD AUTO: 0.14 X10(3) UL (ref 0–0.7)
EOSINOPHIL NFR BLD AUTO: 2.9 %
ERYTHROCYTE [DISTWIDTH] IN BLOOD BY AUTOMATED COUNT: 12.9 % (ref 11–15)
GLUCOSE BLD-MCNC: 112 MG/DL (ref 70–99)
HCT VFR BLD AUTO: 39.4 %
HGB BLD-MCNC: 13 G/DL
IMM GRANULOCYTES # BLD AUTO: 0 X10(3) UL (ref 0–1)
IMM GRANULOCYTES NFR BLD: 0 %
LYMPHOCYTES # BLD AUTO: 2.26 X10(3) UL (ref 1–4)
LYMPHOCYTES NFR BLD AUTO: 47 %
MCH RBC QN AUTO: 31 PG (ref 26–34)
MCHC RBC AUTO-ENTMCNC: 33 G/DL (ref 31–37)
MCV RBC AUTO: 94 FL
MONOCYTES # BLD AUTO: 0.56 X10(3) UL (ref 0.1–1)
MONOCYTES NFR BLD AUTO: 11.6 %
NEUTROPHILS # BLD AUTO: 1.82 X10 (3) UL (ref 1.5–7.7)
NEUTROPHILS # BLD AUTO: 1.82 X10(3) UL (ref 1.5–7.7)
NEUTROPHILS NFR BLD AUTO: 37.9 %
OSMOLALITY SERPL CALC.SUM OF ELEC: 290 MOSM/KG (ref 275–295)
PLATELET # BLD AUTO: 190 10(3)UL (ref 150–450)
POTASSIUM SERPL-SCNC: 3.8 MMOL/L (ref 3.5–5.1)
RBC # BLD AUTO: 4.19 X10(6)UL
SODIUM SERPL-SCNC: 139 MMOL/L (ref 136–145)
WBC # BLD AUTO: 4.8 X10(3) UL (ref 4–11)

## 2024-03-26 PROCEDURE — 96374 THER/PROPH/DIAG INJ IV PUSH: CPT

## 2024-03-26 PROCEDURE — 85025 COMPLETE CBC W/AUTO DIFF WBC: CPT | Performed by: EMERGENCY MEDICINE

## 2024-03-26 PROCEDURE — 80048 BASIC METABOLIC PNL TOTAL CA: CPT | Performed by: EMERGENCY MEDICINE

## 2024-03-26 PROCEDURE — 96361 HYDRATE IV INFUSION ADD-ON: CPT

## 2024-03-26 PROCEDURE — 70491 CT SOFT TISSUE NECK W/DYE: CPT | Performed by: EMERGENCY MEDICINE

## 2024-03-26 NOTE — ED QUICK NOTES
Discharge instructions including follow-up care and signs and symptoms to return to ED were reviewed and discussed with patient. Pt verbalized understanding to all information and all questions asked were answered at this time. Pt is AAOx4, calm, respirations noted as even and unlabored, skin warm and dry, and there are no signs or symptoms of distress noted at this time. Pt ambulatory with a steady gait to exit.

## 2024-03-26 NOTE — ED PROVIDER NOTES
Patient Seen in: [facility] Emergency Department      History     Chief Complaint   Patient presents with    FB in Throat     Stated Complaint: FB in throat?    Subjective:   HPI    Patient is a 58-year-old male who presents with possible fishbone stuck in his throat.  He states he was eating a piece of catfish and is unsure if he has a bone stuck or if he scratched his throat but he feels something in the back of his throat.  He tried drinking water and olive oil and eating bread but still feels the sensation.  He states this occurred around 6:30 PM.  No vomiting.    Objective:   Past Medical History:   Diagnosis Date    Anxiety     Carotid stenosis 05/5/2024    Pulmonary embolism (HCC) 2009    Sleep apnea     CPAP    Sleep apnea, obstructive 09/2019    mild              Past Surgical History:   Procedure Laterality Date    COLONOSCOPY      COLONOSCOPY      COLONOSCOPY N/A 11/20/2019    Procedure: COLONOSCOPY;  Surgeon: Johann Coelho MD;  Location: [facility] ENDOSCOPY    SHOULDER SURG PROC UNLISTED Left                 Social History     Socioeconomic History    Marital status:    Tobacco Use    Smoking status: Never    Smokeless tobacco: Never   Vaping Use    Vaping Use: Never used   Substance and Sexual Activity    Alcohol use: Yes     Alcohol/week: 2.0 standard drinks of alcohol     Types: 2 Glasses of wine per week     Comment: on ocassion     Drug use: No   Other Topics Concern    Caffeine Concern No     Comment: tea    Exercise Yes              Review of Systems    Positive for stated complaint: FB in throat?  Other systems are as noted in HPI.  Constitutional and vital signs reviewed.      All other systems reviewed and negative except as noted above.    Physical Exam     ED Triage Vitals   BP 03/25/24 2120 127/85   Pulse 03/25/24 2120 81   Resp 03/25/24 2120 17   Temp 03/25/24 2120 98.4 °F (36.9 °C)   Temp src 03/25/24 2120 Temporal   SpO2 03/25/24 2120 96 %   O2 Device 03/26/24 0042 None  (Room air)       Current:/68   Pulse 71   Temp 98.4 °F (36.9 °C) (Temporal)   Resp 18   Ht 182.9 cm (6')   Wt 99.8 kg   SpO2 97%   BMI 29.84 kg/m²         Physical Exam  GENERAL: No acute distress, awake and alert  HEENT: EOMI, PERRL, oropharynx normal  Neck: supple  CV: RRR, no murmurs  Resp: CTAB, no wheezes or retractions  Extremities: FROM of all extremities  Neuro: CN intact, normal speech, normal gait, 5/5 motor strength in all extremities, no focal deficits  SKIN: warm, dry, no rashes    ED Course     Labs Reviewed   BASIC METABOLIC PANEL (8) - Abnormal; Notable for the following components:       Result Value    Glucose 112 (*)     All other components within normal limits   CBC W/ DIFFERENTIAL - Abnormal; Notable for the following components:    RBC 4.19 (*)     All other components within normal limits   CBC WITH DIFFERENTIAL WITH PLATELET    Narrative:     The following orders were created for panel order CBC With Differential With Platelet.  Procedure                               Abnormality         Status                     ---------                               -----------         ------                     CBC W/ DIFFERENTIAL[[id number]]          Abnormal            Final result                 Please view results for these tests on the individual orders.            MDM      Medical Decision Making  Patient given IV fluids and glucagon.  He does think that he may have only scratched his throat as he is able to tolerate fluids throughout his stay in the emergency department.  Offered observational stay for further management with GI consultation but patient declines and feels comfortable following up after discharge.  Advised on return precautions.    Amount and/or Complexity of Data Reviewed  External Data Reviewed: labs and radiology.     Details: Recent labs, ct reviewed  Labs: ordered.  Radiology: ordered.     Details:     CT NECK WITH IV CONTRAST      IMPRESSION:  -No evidence of  acute abnormality.    -No radiopaque foreign bodies.  -Unremarkable epiglottis, tonsils and major salivary glands.  -No evidence of abscess.          Disposition and Plan     Clinical Impression:  1. Foreign body in esophagus, initial encounter         Disposition:  Discharge  3/26/2024  3:30 am    Follow-up:  Shira Reed MD  [address] [address]  [phone]    Schedule an appointment as soon as possible for a visit            Medications Prescribed:  Current Discharge Medication List

## 2024-03-26 NOTE — ED INITIAL ASSESSMENT (HPI)
Patient arrived ambulatory to ED, A/O x 4, with complaints of possible fish bone in throat.    Patient states that he was eating fish for dinner around 1830, feels like there is something stuck in throat. Tried home remedies without any relief. Denies difficulty breathing or swallowing.

## 2024-05-03 ENCOUNTER — APPOINTMENT (OUTPATIENT)
Dept: CV DIAGNOSTICS | Facility: HOSPITAL | Age: 59
DRG: 164 | End: 2024-05-03
Attending: EMERGENCY MEDICINE
Payer: COMMERCIAL

## 2024-05-03 ENCOUNTER — APPOINTMENT (OUTPATIENT)
Dept: GENERAL RADIOLOGY | Facility: HOSPITAL | Age: 59
DRG: 164 | End: 2024-05-03
Payer: COMMERCIAL

## 2024-05-03 ENCOUNTER — HOSPITAL ENCOUNTER (INPATIENT)
Facility: HOSPITAL | Age: 59
LOS: 3 days | Discharge: HOME | DRG: 164 | End: 2024-05-06
Attending: EMERGENCY MEDICINE | Admitting: HOSPITALIST
Payer: COMMERCIAL

## 2024-05-03 ENCOUNTER — APPOINTMENT (OUTPATIENT)
Dept: CT IMAGING | Facility: HOSPITAL | Age: 59
DRG: 164 | End: 2024-05-03
Attending: EMERGENCY MEDICINE
Payer: COMMERCIAL

## 2024-05-03 ENCOUNTER — APPOINTMENT (OUTPATIENT)
Dept: INTERVENTIONAL RADIOLOGY/VASCULAR | Facility: HOSPITAL | Age: 59
DRG: 164 | End: 2024-05-03
Attending: RADIOLOGY
Payer: COMMERCIAL

## 2024-05-03 DIAGNOSIS — I26.92 ACUTE SADDLE PULMONARY EMBOLISM, UNSPECIFIED WHETHER ACUTE COR PULMONALE PRESENT (HCC): Primary | ICD-10-CM

## 2024-05-03 LAB
ANION GAP SERPL CALC-SCNC: 9 MMOL/L (ref 0–18)
APTT PPP: 27.6 SECONDS (ref 23.3–35.6)
BASOPHILS # BLD AUTO: 0.03 X10(3) UL (ref 0–0.2)
BASOPHILS NFR BLD AUTO: 0.5 %
BUN BLD-MCNC: 9 MG/DL (ref 9–23)
BUN/CREAT SERPL: 6.7 (ref 10–20)
CALCIUM BLD-MCNC: 9.7 MG/DL (ref 8.7–10.4)
CHLORIDE SERPL-SCNC: 105 MMOL/L (ref 98–112)
CHOLEST SERPL-MCNC: 148 MG/DL (ref ?–200)
CO2 SERPL-SCNC: 24 MMOL/L (ref 21–32)
CREAT BLD-MCNC: 1.35 MG/DL
DEPRECATED RDW RBC AUTO: 44.5 FL (ref 35.1–46.3)
EGFRCR SERPLBLD CKD-EPI 2021: 61 ML/MIN/1.73M2 (ref 60–?)
EOSINOPHIL # BLD AUTO: 0.09 X10(3) UL (ref 0–0.7)
EOSINOPHIL NFR BLD AUTO: 1.4 %
ERYTHROCYTE [DISTWIDTH] IN BLOOD BY AUTOMATED COUNT: 13.2 % (ref 11–15)
GLUCOSE BLD-MCNC: 97 MG/DL (ref 70–99)
GLUCOSE BLDC GLUCOMTR-MCNC: 104 MG/DL (ref 70–99)
HCT VFR BLD AUTO: 43.9 %
HDLC SERPL-MCNC: 39 MG/DL (ref 40–59)
HGB BLD-MCNC: 14.7 G/DL
IMM GRANULOCYTES # BLD AUTO: 0.01 X10(3) UL (ref 0–1)
IMM GRANULOCYTES NFR BLD: 0.2 %
INR BLD: 1.19 (ref 0.8–1.2)
LDLC SERPL CALC-MCNC: 99 MG/DL (ref ?–100)
LYMPHOCYTES # BLD AUTO: 2.3 X10(3) UL (ref 1–4)
LYMPHOCYTES NFR BLD AUTO: 36.1 %
MCH RBC QN AUTO: 30.7 PG (ref 26–34)
MCHC RBC AUTO-ENTMCNC: 33.5 G/DL (ref 31–37)
MCV RBC AUTO: 91.6 FL
MONOCYTES # BLD AUTO: 0.68 X10(3) UL (ref 0.1–1)
MONOCYTES NFR BLD AUTO: 10.7 %
NEUTROPHILS # BLD AUTO: 3.26 X10 (3) UL (ref 1.5–7.7)
NEUTROPHILS # BLD AUTO: 3.26 X10(3) UL (ref 1.5–7.7)
NEUTROPHILS NFR BLD AUTO: 51.1 %
NONHDLC SERPL-MCNC: 109 MG/DL (ref ?–130)
OSMOLALITY SERPL CALC.SUM OF ELEC: 285 MOSM/KG (ref 275–295)
PLATELET # BLD AUTO: 102 10(3)UL (ref 150–450)
PLATELETS.RETICULATED NFR BLD AUTO: 8.5 % (ref 0–7)
POTASSIUM SERPL-SCNC: 4.6 MMOL/L (ref 3.5–5.1)
PROTHROMBIN TIME: 15.8 SECONDS (ref 11.6–14.8)
RBC # BLD AUTO: 4.79 X10(6)UL
SODIUM SERPL-SCNC: 138 MMOL/L (ref 136–145)
TRIGL SERPL-MCNC: 46 MG/DL (ref 30–149)
TROPONIN I SERPL HS-MCNC: 424 NG/L
VLDLC SERPL CALC-MCNC: 8 MG/DL (ref 0–30)
WBC # BLD AUTO: 6.4 X10(3) UL (ref 4–11)

## 2024-05-03 PROCEDURE — 93306 TTE W/DOPPLER COMPLETE: CPT | Performed by: EMERGENCY MEDICINE

## 2024-05-03 PROCEDURE — 71045 X-RAY EXAM CHEST 1 VIEW: CPT

## 2024-05-03 PROCEDURE — 71260 CT THORAX DX C+: CPT | Performed by: EMERGENCY MEDICINE

## 2024-05-03 PROCEDURE — 99223 1ST HOSP IP/OBS HIGH 75: CPT | Performed by: HOSPITALIST

## 2024-05-03 PROCEDURE — 99223 1ST HOSP IP/OBS HIGH 75: CPT | Performed by: INTERNAL MEDICINE

## 2024-05-03 PROCEDURE — X2CY3T7 EXTIRPATION OF MATTER FROM GREAT VESSEL USING COMPUTER-AIDED MECHANICAL ASPIRATION, PERCUTANEOUS APPROACH, NEW TECHNOLOGY GROUP 7: ICD-10-PCS | Performed by: RADIOLOGY

## 2024-05-03 RX ORDER — ONDANSETRON 2 MG/ML
4 INJECTION INTRAMUSCULAR; INTRAVENOUS EVERY 6 HOURS PRN
Status: DISCONTINUED | OUTPATIENT
Start: 2024-05-03 | End: 2024-05-06

## 2024-05-03 RX ORDER — HEPARIN SODIUM AND DEXTROSE 10000; 5 [USP'U]/100ML; G/100ML
INJECTION INTRAVENOUS CONTINUOUS
Status: DISCONTINUED | OUTPATIENT
Start: 2024-05-04 | End: 2024-05-05

## 2024-05-03 RX ORDER — ACETAMINOPHEN 500 MG
500 TABLET ORAL EVERY 4 HOURS PRN
Status: DISCONTINUED | OUTPATIENT
Start: 2024-05-03 | End: 2024-05-06

## 2024-05-03 RX ORDER — NICOTINE POLACRILEX 4 MG
30 LOZENGE BUCCAL
Status: DISCONTINUED | OUTPATIENT
Start: 2024-05-03 | End: 2024-05-06

## 2024-05-03 RX ORDER — HEPARIN SODIUM 1000 [USP'U]/ML
INJECTION, SOLUTION INTRAVENOUS; SUBCUTANEOUS
Status: COMPLETED
Start: 2024-05-03 | End: 2024-05-03

## 2024-05-03 RX ORDER — LIDOCAINE HYDROCHLORIDE 20 MG/ML
INJECTION, SOLUTION EPIDURAL; INFILTRATION; INTRACAUDAL; PERINEURAL
Status: COMPLETED
Start: 2024-05-03 | End: 2024-05-03

## 2024-05-03 RX ORDER — SODIUM CHLORIDE 0.9 % (FLUSH) 0.9 %
10 SYRINGE (ML) INJECTION AS NEEDED
Status: DISCONTINUED | OUTPATIENT
Start: 2024-05-03 | End: 2024-05-06

## 2024-05-03 RX ORDER — HEPARIN SODIUM 1000 [USP'U]/ML
80 INJECTION, SOLUTION INTRAVENOUS; SUBCUTANEOUS ONCE
Status: COMPLETED | OUTPATIENT
Start: 2024-05-03 | End: 2024-05-03

## 2024-05-03 RX ORDER — TEMAZEPAM 15 MG/1
15 CAPSULE ORAL NIGHTLY PRN
Status: DISCONTINUED | OUTPATIENT
Start: 2024-05-03 | End: 2024-05-06

## 2024-05-03 RX ORDER — NICOTINE POLACRILEX 4 MG
15 LOZENGE BUCCAL
Status: DISCONTINUED | OUTPATIENT
Start: 2024-05-03 | End: 2024-05-06

## 2024-05-03 RX ORDER — PROCHLORPERAZINE EDISYLATE 5 MG/ML
5 INJECTION INTRAMUSCULAR; INTRAVENOUS EVERY 8 HOURS PRN
Status: DISCONTINUED | OUTPATIENT
Start: 2024-05-03 | End: 2024-05-06

## 2024-05-03 RX ORDER — DEXTROSE MONOHYDRATE 25 G/50ML
50 INJECTION, SOLUTION INTRAVENOUS
Status: DISCONTINUED | OUTPATIENT
Start: 2024-05-03 | End: 2024-05-06

## 2024-05-03 RX ORDER — HEPARIN SODIUM AND DEXTROSE 10000; 5 [USP'U]/100ML; G/100ML
18 INJECTION INTRAVENOUS ONCE
Status: COMPLETED | OUTPATIENT
Start: 2024-05-03 | End: 2024-05-04

## 2024-05-03 RX ORDER — MIDAZOLAM HYDROCHLORIDE 1 MG/ML
INJECTION INTRAMUSCULAR; INTRAVENOUS
Status: COMPLETED
Start: 2024-05-03 | End: 2024-05-03

## 2024-05-03 NOTE — ED INITIAL ASSESSMENT (HPI)
Pt to ED with c/o mid sternal chest discomfort and dyspnea with exertion that started today. Pt denies cough or fever. Pt denies fall or trauma. No respiratory distress noted. Pt is alert and oriented x4. Pt skin parameters WNL. Pt ambulating by self with steady gait. Pt with hx of PE not on thinners or asa. Pt states PE 2009. Pt denies lower leg edema.

## 2024-05-03 NOTE — ED PROVIDER NOTES
Patient Seen in: [facility] Emergency Department      History     Chief Complaint   Patient presents with    Chest Pain     Stated Complaint: Chest Pain    Subjective:   HPI    Patient is a 58-year-old male with history as listed below.  He states today he started to experience exertional chest discomfort and shortness of breath.  He states when he climbs stairs he had tightness in his chest and felt short of breath when he sat down his symptoms improved.  No fever no cough.  No diaphoresis no nausea no indigestion    Patient had a hip replacement surgery February 1 he was on Eliquis briefly after that    Objective:   Past Medical History:    Anxiety    Carotid stenosis    Pulmonary embolism (HCC)    Sleep apnea    CPAP    Sleep apnea, obstructive    mild              Past Surgical History:   Procedure Laterality Date    Colonoscopy      Colonoscopy      Colonoscopy N/A 11/20/2019    Procedure: COLONOSCOPY;  Surgeon: Johann Coelho MD;  Location: [facility] ENDOSCOPY    Shoulder surg proc unlisted Left                 Social History     Socioeconomic History    Marital status:    Tobacco Use    Smoking status: Never    Smokeless tobacco: Never   Vaping Use    Vaping status: Never Used   Substance and Sexual Activity    Alcohol use: Yes     Alcohol/week: 2.0 standard drinks of alcohol     Types: 2 Glasses of wine per week     Comment: on ocassion     Drug use: No   Other Topics Concern    Caffeine Concern No     Comment: tea    Exercise Yes              Review of Systems    Positive for stated complaint: Chest Pain  Other systems are as noted in HPI.  Constitutional and vital signs reviewed.      All other systems reviewed and negative except as noted above.    Physical Exam     ED Triage Vitals [05/03/24 1456]   /90   Pulse 108   Resp 26   Temp 98 °F (36.7 °C)   Temp src Temporal   SpO2 97 %   O2 Device None (Room air)       Current:/87   Pulse 88   Temp 98 °F (36.7 °C) (Temporal)   Resp  24   Ht 180.3 cm (5' 11\")   Wt 97.2 kg   SpO2 97%   BMI 29.89 kg/m²         Physical Exam    Constitutional: Oriented to person, place, and time. Appears well-developed and well-nourished.   HEENT:   Head: Normocephalic and atraumatic.   Right Ear: External ear normal.   Left Ear: External ear normal.   Nose: Nose normal.   Mouth/Throat: Oropharynx is clear and moist.   Eyes: Conjunctivae and EOM are normal. Pupils are equal, round, and reactive to light.   Neck: Neck supple.   Cardiovascular: Normal rate, regular rhythm, normal heart sounds and intact distal pulses.    Pulmonary/Chest: Effort normal and breath sounds normal. No respiratory distress.   Abdominal: Soft. Bowel sounds are normal. Exhibits no distension and no mass. There is no tenderness. There is no rebound and no guarding.   Musculoskeletal: Normal range of motion. Exhibits no edema or tenderness.   Lymphadenopathy: No cervical adenopathy.   Neurological: Alert and oriented to person, place, and time. Normal reflexes. No cranial nerve deficit. No motor os sensory defecits noted Coordination normal.   Skin: Skin is warm and dry.   Psychiatric: Normal mood and affect. Behavior is normal. Judgment and thought content normal.   Nursing note and vitals reviewed.        ED Course     Labs Reviewed   BASIC METABOLIC PANEL (8) - Abnormal; Notable for the following components:       Result Value    Creatinine 1.35 (*)     BUN/CREA Ratio 6.7 (*)     All other components within normal limits   TROPONIN I HIGH SENSITIVITY - Abnormal; Notable for the following components:    Troponin I (High Sensitivity) 424 (*)     All other components within normal limits   LIPID PANEL - Abnormal; Notable for the following components:    HDL Cholesterol 39 (*)     All other components within normal limits   PROTHROMBIN TIME (PT) - Abnormal; Notable for the following components:    PT 15.8 (*)     All other components within normal limits   CBC W/ DIFFERENTIAL - Abnormal;  Notable for the following components:    .0 (*)     Immature Platelet Fraction 8.5 (*)     All other components within normal limits   PTT, ACTIVATED - Normal   CBC WITH DIFFERENTIAL WITH PLATELET    Narrative:     The following orders were created for panel order CBC With Differential With Platelet.  Procedure                               Abnormality         Status                     ---------                               -----------         ------                     CBC W/ DIFFERENTIAL[[id number]]          Abnormal            Final result                 Please view results for these tests on the individual orders.   SCAN SLIDE   RAINBOW DRAW BLUE     EKG    Rate, intervals and axes as noted on EKG Report.  Rate: 110  Rhythm: Sinus Rhythm  Reading: Sinus tachycardia otherwise normal                          MDM      Use of independent historian:     I personally reviewed and interpreted the images : No infiltrate seen on chest x-ray    CT CHEST PE AORTA (IV ONLY) (CPT=71260)    Result Date: 5/3/2024  CONCLUSION:   Saddle pulmonary embolism involving the main pulmonary artery as well as extensive pulmonary emboli within bilateral lobar arteries and more distal branches, with associated right heart strain.  This was communicated to Dr. Qamar Schaeffer on 05/03/2024 at 6:18  p.m.      Dictated by (CST): Jaqui Ohara MD on 5/03/2024 at 6:18 PM     Finalized by (CST): Jaqui Ohara MD on 5/03/2024 at 6:24 PM          XR CHEST AP PORTABLE  (CPT=71045)    Result Date: 5/3/2024  CONCLUSION: Normal examination.     Dictated by (CST): Kaden Salinas MD on 5/03/2024 at 3:31 PM     Finalized by (CST): Kaden Salinas MD on 5/03/2024 at 3:32 PM           Vitals:    05/03/24 1700 05/03/24 1730 05/03/24 1815 05/03/24 1901   BP: 123/86 120/87 127/87    Pulse: 93 90 88    Resp: 21 22 24    Temp:       TempSrc:       SpO2: 98% 97% 97%    Weight:    97.2 kg   Height:         *I personally reviewed and interpreted all ED  vitals.    Pulse Ox: 97%, Room air, Normal     EKG interpretation above independently interpreted by me    Monitor Interpretation:   sinus tachycardia independently interpreted by me    Differential Diagnosis/ Diagnostic Considerations: Patient with exertional chest pain consider ACS consider pneumonia consider PE    Medical Record Review: I personally reviewed available prior medical records for any recent pertinent discharge summaries, testing, and procedures and reviewed those reports and found office visit 4/30/2024 with orthopedist 13 weeks postop visit unremarkable..    Complicating Factors: The patient already has history of PE in the past which contribute to the complexity of this ED evaluation.    Social determinants of health:    Prescription drug management:      Shared Decision Making:    ED Course: CT findings noted.  Discussed case with Dr. Du from pulmonary Dr. Ludwig from interventional radiology Dr. Cates the hospitalist heparin initiated.  Will admit to CCU.  Echo ordered.  Patient remains hemodynamically stable with O2 sats of 96% on room air.    Discussion of management with other healthcare providers: Patient seen in the ED by Dr. Cates and Dr. Du.    Condition upon leaving the department: Stable    Admission disposition: 5/3/2024  7:01 PM           I spent a total of 45 minutes of critical care time in obtaining history, performing a physical exam, bedside monitoring of interventions, collecting and interpreting tests and discussion with consultants but not including time spent performing procedures.                               Medical Decision Making      Disposition and Plan     Clinical Impression:  1. Acute saddle pulmonary embolism, unspecified whether acute cor pulmonale present (HCC)         Disposition:  Admit  5/3/2024  7:01 pm    Follow-up:  No follow-up provider specified.        Medications Prescribed:  Current Discharge Medication List                            Hospital  Problems       Present on Admission  Date Reviewed: 3/25/2024            ICD-10-CM Noted POA    * (Principal) Acute saddle pulmonary embolism, unspecified whether acute cor pulmonale present (HCC) I26.92 5/3/2024 Unknown

## 2024-05-04 ENCOUNTER — APPOINTMENT (OUTPATIENT)
Dept: ULTRASOUND IMAGING | Facility: HOSPITAL | Age: 59
DRG: 164 | End: 2024-05-04
Attending: HOSPITALIST
Payer: COMMERCIAL

## 2024-05-04 PROBLEM — I82.4Y2 ACUTE DEEP VEIN THROMBOSIS (DVT) OF PROXIMAL VEIN OF LEFT LOWER EXTREMITY (HCC): Status: ACTIVE | Noted: 2024-05-04

## 2024-05-04 LAB
ANION GAP SERPL CALC-SCNC: 7 MMOL/L (ref 0–18)
APTT PPP: 101.6 SECONDS (ref 23.3–35.6)
APTT PPP: 163.5 SECONDS (ref 23.3–35.6)
APTT PPP: >240 SECONDS (ref 23.3–35.6)
ATRIAL RATE: 110 BPM
BASOPHILS # BLD AUTO: 0.02 X10(3) UL (ref 0–0.2)
BASOPHILS NFR BLD AUTO: 0.3 %
BUN BLD-MCNC: 9 MG/DL (ref 9–23)
BUN/CREAT SERPL: 8.4 (ref 10–20)
CALCIUM BLD-MCNC: 9 MG/DL (ref 8.7–10.4)
CHLORIDE SERPL-SCNC: 107 MMOL/L (ref 98–112)
CO2 SERPL-SCNC: 24 MMOL/L (ref 21–32)
CREAT BLD-MCNC: 1.07 MG/DL
DEPRECATED RDW RBC AUTO: 43.2 FL (ref 35.1–46.3)
EGFRCR SERPLBLD CKD-EPI 2021: 81 ML/MIN/1.73M2 (ref 60–?)
EOSINOPHIL # BLD AUTO: 0.05 X10(3) UL (ref 0–0.7)
EOSINOPHIL NFR BLD AUTO: 0.8 %
ERYTHROCYTE [DISTWIDTH] IN BLOOD BY AUTOMATED COUNT: 13 % (ref 11–15)
EST. AVERAGE GLUCOSE BLD GHB EST-MCNC: 114 MG/DL (ref 68–126)
GLUCOSE BLD-MCNC: 103 MG/DL (ref 70–99)
GLUCOSE BLDC GLUCOMTR-MCNC: 103 MG/DL (ref 70–99)
GLUCOSE BLDC GLUCOMTR-MCNC: 104 MG/DL (ref 70–99)
GLUCOSE BLDC GLUCOMTR-MCNC: 123 MG/DL (ref 70–99)
GLUCOSE BLDC GLUCOMTR-MCNC: 96 MG/DL (ref 70–99)
HBA1C MFR BLD: 5.6 % (ref ?–5.7)
HCT VFR BLD AUTO: 40 %
HGB BLD-MCNC: 13.6 G/DL
IMM GRANULOCYTES # BLD AUTO: 0.01 X10(3) UL (ref 0–1)
IMM GRANULOCYTES NFR BLD: 0.2 %
LYMPHOCYTES # BLD AUTO: 1.89 X10(3) UL (ref 1–4)
LYMPHOCYTES NFR BLD AUTO: 30 %
MCH RBC QN AUTO: 30.7 PG (ref 26–34)
MCHC RBC AUTO-ENTMCNC: 34 G/DL (ref 31–37)
MCV RBC AUTO: 90.3 FL
MONOCYTES # BLD AUTO: 0.65 X10(3) UL (ref 0.1–1)
MONOCYTES NFR BLD AUTO: 10.3 %
NEUTROPHILS # BLD AUTO: 3.67 X10 (3) UL (ref 1.5–7.7)
NEUTROPHILS # BLD AUTO: 3.67 X10(3) UL (ref 1.5–7.7)
NEUTROPHILS NFR BLD AUTO: 58.4 %
OSMOLALITY SERPL CALC.SUM OF ELEC: 285 MOSM/KG (ref 275–295)
P AXIS: 38 DEGREES
P-R INTERVAL: 158 MS
PLATELET # BLD AUTO: 96 10(3)UL (ref 150–450)
PLATELETS.RETICULATED NFR BLD AUTO: 9 % (ref 0–7)
POTASSIUM SERPL-SCNC: 4 MMOL/L (ref 3.5–5.1)
Q-T INTERVAL: 346 MS
QRS DURATION: 90 MS
QTC CALCULATION (BEZET): 468 MS
R AXIS: 11 DEGREES
RBC # BLD AUTO: 4.43 X10(6)UL
SODIUM SERPL-SCNC: 138 MMOL/L (ref 136–145)
T AXIS: -5 DEGREES
VENTRICULAR RATE: 110 BPM
WBC # BLD AUTO: 6.3 X10(3) UL (ref 4–11)

## 2024-05-04 PROCEDURE — 93970 EXTREMITY STUDY: CPT | Performed by: HOSPITALIST

## 2024-05-04 PROCEDURE — 99233 SBSQ HOSP IP/OBS HIGH 50: CPT | Performed by: HOSPITALIST

## 2024-05-04 PROCEDURE — 99233 SBSQ HOSP IP/OBS HIGH 50: CPT | Performed by: INTERNAL MEDICINE

## 2024-05-04 RX ORDER — LATANOPROST 50 UG/ML
1 SOLUTION/ DROPS OPHTHALMIC NIGHTLY
Status: DISCONTINUED | OUTPATIENT
Start: 2024-05-04 | End: 2024-05-06

## 2024-05-04 RX ORDER — BRIMONIDINE TARTRATE 2 MG/ML
1 SOLUTION/ DROPS OPHTHALMIC 2 TIMES DAILY
Status: DISCONTINUED | OUTPATIENT
Start: 2024-05-04 | End: 2024-05-06

## 2024-05-04 RX ORDER — BRIMONIDINE TARTRATE 2 MG/ML
1 SOLUTION/ DROPS OPHTHALMIC 2 TIMES DAILY
COMMUNITY

## 2024-05-04 RX ORDER — PANTOPRAZOLE SODIUM 20 MG/1
20 TABLET, DELAYED RELEASE ORAL
Status: DISCONTINUED | OUTPATIENT
Start: 2024-05-04 | End: 2024-05-06

## 2024-05-04 NOTE — H&P
[facility]    PATIENT'S NAME: RENNY FRANCIS   ATTENDING PHYSICIAN: Qamar Schaeffer MD   PATIENT ACCOUNT#:   [id number]    LOCATION:  [address]  MEDICAL RECORD #:   [id number]       YOB: 1965  ADMISSION DATE:       05/03/2024    HISTORY AND PHYSICAL EXAMINATION    CHIEF COMPLAINT:  Saddle pulmonary embolism.    HISTORY OF PRESENT ILLNESS:  Patient is a 58-year-old  male who has been having discomfort in his left calf area for the last 2 weeks and around yesterday started developing progressive shortness of breath and pleuritic-type chest pain.  Came in today to the emergency department for evaluation.  Upon arrival was noted to have systolic blood pressure of 113, pulse ox 97% on room air.  Troponin was 424.  CT scan of the chest, rule out pulmonary embolism, showed saddle pulmonary embolism involving the left main pulmonary artery as well as extensive bilateral pulmonary emboli in lobar and distal branches with right heart strain.  Chest x-ray showed no acute findings.  Started on IV heparin.  Stat 2D echocardiogram with Doppler was ordered.  Patient will be admitted to the hospital for further management.     PAST MEDICAL HISTORY:  Pulmonary embolism in 2009, obstructive sleep apnea, obesity, anxiety, degenerative joint disease of lumbar spine, glaucoma, carotid arthrosclerosis.    PAST SURGICAL HISTORY:  Left shoulder surgery and right total hip arthroplasty.    MEDICATIONS:  Please see medication reconciliation list.     ALLERGIES:  No known drug allergies.     FAMILY HISTORY:  Positive for father had colon cancer.      SOCIAL HISTORY:  No tobacco or drug use.  Social alcohol.  Lives with his family.  Independent in his basic activities of daily living.     REVIEW OF SYSTEMS:  Patient said he had right total hip arthroplasty in February 2024, and he has been participating in physical therapy.  Around 1 week or 2 ago, started developing tightness in his left calf  area.  Yesterday night, started feeling a bit of short of breath, and today he was dyspneic without exertion and decided to come into the emergency department for evaluation.  Other 12-point review of systems is negative.        PHYSICAL EXAMINATION:    GENERAL:  Alert and oriented to time, place and person.  No acute distress.   VITAL SIGNS:  Temperature 98.0, pulse 88, respiratory rate 24, blood pressure 127/87, pulse ox 97% on room air.    HEENT:  Atraumatic.  Oropharynx clear.  Moist mucous membranes.  Normal hard and soft palate.  Eyes:  Anicteric sclerae.    NECK:  Supple.  No lymphadenopathy.  Trachea midline.  Full range of motion.   LUNGS:  Clear to auscultation bilaterally.  Normal respiratory effort.    HEART:  Regular rate and rhythm.  S1 and S2 auscultated.  No murmur.    ABDOMEN:  Soft, nondistended.  No tenderness.  Positive bowel sounds.   EXTREMITIES:  No peripheral edema, clubbing or cyanosis.   NEUROLOGIC:  Motor and sensory intact.      ASSESSMENT:    1.   Saddle pulmonary embolism with right ventricular strain.   2.   Abnormal troponin secondary to above.    PLAN:  Patient will be admitted to progressive care unit.  Complete bedrest.  Monitor hemodynamic and respiratory status.  Obtain venous Doppler study, both lower extremities.  A 2D echocardiogram with Doppler was ordered.  Pulmonary and interventional radiology consults to evaluate patient for possible tPA catheter infusion.  IV heparin was ordered and started in the emergency room.  Further recommendations to follow.     Dictated By Shane Cates MD  d: 05/03/2024 19:18:42  t: 05/03/2024 19:57:57  Job [id number]  FB/

## 2024-05-04 NOTE — PLAN OF CARE
Received pt from IR for saddle PE, pt underwent thrombectomy and started on hep gtt. Pt a&ox4, room air/nasal canula prn.   Problem: Patient Centered Care  Goal: Patient preferences are identified and integrated in the patient's plan of care  Description: Interventions:  - Provide timely, complete, and accurate information to patient/family  - Incorporate patient and family knowledge, values, beliefs, and cultural backgrounds into the planning and delivery of care  - Encourage patient/family to participate in care and decision-making at the level they choose  - Honor patient and family perspectives and choices  Outcome: Progressing     Problem: Diabetes/Glucose Control  Goal: Glucose maintained within prescribed range  Description: INTERVENTIONS:  - Monitor Blood Glucose as ordered  - Assess for signs and symptoms of hyperglycemia and hypoglycemia  - Administer ordered medications to maintain glucose within target range  - Assess barriers to adequate nutritional intake and initiate nutrition consult as needed  - Instruct patient on self management of diabetes  Outcome: Progressing     Problem: Patient/Family Goals  Goal: Patient/Family Long Term Goal  Description: Patient's Long Term Goal: control hip pain    Interventions:  - PT/OT exercises, medication, repositioning  - See additional Care Plan goals for specific interventions  Outcome: Progressing  Goal: Patient/Family Short Term Goal  Description: Patient's Short Term Goal: out of hospital    Interventions:   - medication, labs and diagnostics, provider eval  - See additional Care Plan goals for specific interventions  Outcome: Progressing     Problem: CARDIOVASCULAR - ADULT  Goal: Maintains optimal cardiac output and hemodynamic stability  Description: INTERVENTIONS:  - Monitor vital signs, rhythm, and trends  - Monitor for bleeding, hypotension and signs of decreased cardiac output  - Evaluate effectiveness of vasoactive medications to optimize hemodynamic  stability  - Monitor arterial and/or venous puncture sites for bleeding and/or hematoma  - Assess quality of pulses, skin color and temperature  - Assess for signs of decreased coronary artery perfusion - ex. Angina  - Evaluate fluid balance, assess for edema, trend weights  Outcome: Progressing  Goal: Absence of cardiac arrhythmias or at baseline  Description: INTERVENTIONS:  - Continuous cardiac monitoring, monitor vital signs, obtain 12 lead EKG if indicated  - Evaluate effectiveness of antiarrhythmic and heart rate control medications as ordered  - Initiate emergency measures for life threatening arrhythmias  - Monitor electrolytes and administer replacement therapy as ordered  Outcome: Progressing     Problem: RESPIRATORY - ADULT  Goal: Achieves optimal ventilation and oxygenation  Description: INTERVENTIONS:  - Assess for changes in respiratory status  - Assess for changes in mentation and behavior  - Position to facilitate oxygenation and minimize respiratory effort  - Oxygen supplementation based on oxygen saturation or ABGs  - Provide Smoking Cessation handout, if applicable  - Encourage broncho-pulmonary hygiene including cough, deep breathe, Incentive Spirometry  - Assess the need for suctioning and perform as needed  - Assess and instruct to report SOB or any respiratory difficulty  - Respiratory Therapy support as indicated  - Manage/alleviate anxiety  - Monitor for signs/symptoms of CO2 retention  Outcome: Progressing

## 2024-05-04 NOTE — PROGRESS NOTES
[facility]  part of [facility]  Hospitalist Progress Note     Niko Mast Patient Status:  Inpatient    1965  58 year old CSN [id number]   Location 222/222-A Attending Amish Teague MD   Hosp Day # 1 PCP Dawson Means,      Assessment & Plan:   ----------------------------------  Pulmonary embolism, submassive, saddle.  Contributing factors include sedentary lifestyle, history of venous thromboembolism.  Hypoxia is present.  RV strain/heart failure is present.  Lower extremity DVTs present in left lower extremity.  I discussed with the patient, family at length regarding the risks, benefits, alternatives to anticoagulation.  We discussed the risks of bleeding including possible life-threatening bleeding.  We discussed that this must be weighed against risk of clot progression, migration which could result in significant morbidity, sudden death.  They understand and wish to proceed with anticoagulation.  Status post thrombectomy 5/3 by IR  -Anticoagulation: Heparin drip, switch to DOAC per pulmonology  -Pulmonology consultation appreciated  -Supplemental oxygen as needed, titrate down as tolerated  -Hypercoagulable work-up not indicated  -Echo noted to have RV systolic failure  -Telemetry  -May benefit from having lifelong anticoagulation    DVT. See above    Other problems  Obstructive sleep apnea  DJD  Glaucoma  Carotid arthrosclerosis  Abnormal troponin, due to PE    dvt prophylaxis: heparin gtt  code status: full code  dispo: home upon medical clearance    I personally reviewed the available laboratories, imaging including CT chest. I discussed/will discuss the case with pulmonology. I ordered laboratories, studies including PTT. I adjusted medications including heparin drip. Medical decision making high, risk is high.    Subjective:   ----------------------------------  Breathing is a little bit better today.  Although he is now on 4 L nasal cannula.  No chest pain.   Minimal discomfort in the left lower calf.      Objective:   Chief Complaint:   Chief Complaint   Patient presents with    Chest Pain     ----------------------------------  Temp:  [97 °F (36.1 °C)-98.4 °F (36.9 °C)] 97 °F (36.1 °C)  Pulse:  [] 95  Resp:  [14-26] 15  BP: ()/() 107/76  SpO2:  [87 %-98 %] 96 %  Gen: A+Ox3.  No distress.   HEENT: NCAT, neck supple, no carotid bruit.  CV: RRR, S1S2, and intact distal pulses. No gallop, rub, murmur.  Pulm: Effort and breath sounds normal. No distress, wheezes, rales, rhonchi.  Abd: Soft, NTND, BS normal, no mass, no HSM, no rebound/guarding.   Neuro: Normal reflexes, CN. Sensory/motor exams grossly normal deficit.   MS: No joint effusions.  No peripheral edema.  Skin: Skin is warm and dry. No rashes, erythema, diaphoresis.   Psych: Normal mood and affect. Calm, cooperative    Labs:  Lab Results   Component Value Date    HGB 13.6 05/04/2024    WBC 6.3 05/04/2024    PLT 96.0 (L) 05/04/2024     05/04/2024    K 4.0 05/04/2024    CREATSERUM 1.07 05/04/2024    INR 1.19 05/03/2024    AST 27 03/17/2024    ALT 19 03/17/2024          latanoprost  1 drop Both Eyes Nightly    pantoprazole  20 mg Oral QAM AC    brimonidine  1 drop Both Eyes BID    insulin aspart  1-5 Units Subcutaneous TID CC       acetaminophen    ondansetron    prochlorperazine    temazepam    glucose **OR** glucose **OR** glucose-vitamin C **OR** dextrose **OR** glucose **OR** glucose **OR** glucose-vitamin C    sodium chloride 0.9%

## 2024-05-04 NOTE — PROGRESS NOTES
[facility]  part of [facility]    Progress Note      Assessment and Plan:   1.  Saddle pulmonary embolism-the patient is doing well status post suction thrombectomy.  Did not get tPA.  Now on heparin drip.  Doing well clinically.  Okay to floor transfer.  The patient has nonocclusive DVT in the distal superficial femoral vein and popliteal veins on the left.     Recommendations:  1.  Okay to floor transfer  2.  Heparin for now-likely to NOAC tomorrow  3.  Will follow clinically.     2.  Diabetes mellitus-insulin     3.  Sleep apnea-PAP therapy nightly      Subjective:   Niko Mast is a(n) 58 year old male who is breathing better    Objective:   Blood pressure 97/67, pulse 83, temperature 99.2 °F (37.3 °C), temperature source Temporal, resp. rate 21, height 5' 11\" (1.803 m), weight 206 lb 9.1 oz (93.7 kg), SpO2 95%.    Physical Exam alert male  HEENT examination is unremarkable with pupils equal round and reactive to light and accommodation.   Neck without adenopathy, thyromegaly, JVD nor bruit.   Lungs clear to auscultation and percussion.  Cardiac regular rate and rhythm no murmur.   Abdomen nontender, without hepatosplenomegaly and no mass appreciable.   Extremities without clubbing cyanosis nor edema.   Neurologic grossly intact with symmetric tone and strength and reflex.  Skin without gross abnormality     Results:     Lab Results   Component Value Date    WBC 6.3 05/04/2024    HGB 13.6 05/04/2024    HCT 40.0 05/04/2024    PLT 96.0 05/04/2024    CREATSERUM 1.07 05/04/2024    BUN 9 05/04/2024     05/04/2024    K 4.0 05/04/2024     05/04/2024    CO2 24.0 05/04/2024     05/04/2024    CA 9.0 05/04/2024    .5 05/04/2024   Lower extremity venous ultrasound-Nonocclusive DVT in the distal superficial femoral vein and popliteal veins.       Valeriano Ellison MD  Medical Director, Critical Care, [facility]  Medical Director, [facility]  Pager:  [phone]

## 2024-05-04 NOTE — ED QUICK NOTES
Orders for admission, patient is aware of plan and ready to go upstairs. Any questions, please call ED RN Suad at extension [phone].     Patient Covid vaccination status: Fully vaccinated     COVID Test Ordered in ED: None    COVID Suspicion at Admission: N/A    Running Infusions:    [START ON 5/4/2024] continuous dose heparin          Mental Status/LOC at time of transport: x4    Other pertinent information:   CIWA score: N/A   NIH score:  N/A

## 2024-05-04 NOTE — BRIEF PROCEDURE NOTE
[facility]  part of [facility]  Procedure Note    Niko Mast Patient Status:  Emergency    1965 MRN [id number]   Location [facility] INTERVENTIONAL SUITES Attending No att. providers found   Hosp Day # 0 PCP Dawson Means DO     Procedure: pulmonary thrombectomy    Pre-Procedure Diagnosis:  saddle PE c R heart strain (submassive)    Post-Procedure Diagnosis: same    Anesthesia:  Sedation    Findings:  extensive pulmonary thromboemboli in main, R and L upper and lower lobe branches, cleared c Penumbra thrombectomy,c PA pressures reduced from 43 mm Hg systolic to 32 mm Hg.  L pulmonary arteries completely cleared.of thrombus. There is residual nonocclusive clot in R pulmonary artery, resistatnt to suction thrombectemy       Specimens: 0    Blood Loss:  350cc      Complications:  None    Plan:   heparin transition to DOAC    Paramjit Hayden MD  5/3/2024

## 2024-05-04 NOTE — PLAN OF CARE
Hep gtt continued.  Problem: Diabetes/Glucose Control  Goal: Glucose maintained within prescribed range  Description: INTERVENTIONS:  - Monitor Blood Glucose as ordered  - Assess for signs and symptoms of hyperglycemia and hypoglycemia  - Administer ordered medications to maintain glucose within target range  - Assess barriers to adequate nutritional intake and initiate nutrition consult as needed  - Instruct patient on self management of diabetes  Outcome: Progressing     Problem: CARDIOVASCULAR - ADULT  Goal: Maintains optimal cardiac output and hemodynamic stability  Description: INTERVENTIONS:  - Monitor vital signs, rhythm, and trends  - Monitor for bleeding, hypotension and signs of decreased cardiac output  - Evaluate effectiveness of vasoactive medications to optimize hemodynamic stability  - Monitor arterial and/or venous puncture sites for bleeding and/or hematoma  - Assess quality of pulses, skin color and temperature  - Assess for signs of decreased coronary artery perfusion - ex. Angina  - Evaluate fluid balance, assess for edema, trend weights  Outcome: Progressing  Goal: Absence of cardiac arrhythmias or at baseline  Description: INTERVENTIONS:  - Continuous cardiac monitoring, monitor vital signs, obtain 12 lead EKG if indicated  - Evaluate effectiveness of antiarrhythmic and heart rate control medications as ordered  - Initiate emergency measures for life threatening arrhythmias  - Monitor electrolytes and administer replacement therapy as ordered  Outcome: Progressing     Problem: RESPIRATORY - ADULT  Goal: Achieves optimal ventilation and oxygenation  Description: INTERVENTIONS:  - Assess for changes in respiratory status  - Assess for changes in mentation and behavior  - Position to facilitate oxygenation and minimize respiratory effort  - Oxygen supplementation based on oxygen saturation or ABGs  - Provide Smoking Cessation handout, if applicable  - Encourage broncho-pulmonary hygiene including  cough, deep breathe, Incentive Spirometry  - Assess the need for suctioning and perform as needed  - Assess and instruct to report SOB or any respiratory difficulty  - Respiratory Therapy support as indicated  - Manage/alleviate anxiety  - Monitor for signs/symptoms of CO2 retention  Outcome: Progressing

## 2024-05-04 NOTE — CONSULTS
[facility]  part of [facility]    Consult Note    Date:  5/3/2024  Date of Admission:  5/3/2024    Chief Complaint:   Niko Mast is a(n) 58 year old male with pulmonary embolism.    HPI:   The patient has a prior history of pulmonary embolism in 2009.  Approximately 10 days ago the patient developed left calf pain.  Today he noted increased shortness of breath while walking upstairs and he had central chest tightness.  He came to the emergency room for this concern and was found to have a saddle pulmonary embolism.  There was no syncope, hemoptysis no pleurisy.  There is no family history of deep venous thrombosis.  He had a right hip arthroplasty in February of this year.  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    History     Past Medical History:    Anxiety    Carotid stenosis    Pulmonary embolism (HCC)    Sleep apnea    CPAP    Sleep apnea, obstructive    mild     Past Surgical History:   Procedure Laterality Date    Colonoscopy      Colonoscopy      Colonoscopy N/A 11/20/2019    Procedure: COLONOSCOPY;  Surgeon: Johann Coelho MD;  Location: [facility] ENDOSCOPY    Shoulder surg proc unlisted Left      Family History   Problem Relation Age of Onset    Cancer Father 72        colon cancer     Social History: , 1 child, no tobacco, occasional wine, [employer]  Social History     Socioeconomic History    Marital status:    Tobacco Use    Smoking status: Never    Smokeless tobacco: Never   Vaping Use    Vaping status: Never Used   Substance and Sexual Activity    Alcohol use: Yes     Alcohol/week: 2.0 standard drinks of alcohol     Types: 2 Glasses of wine per week     Comment: on ocassion     Drug use: No   Other Topics Concern    Caffeine Concern No     Comment: tea    Exercise Yes     Allergies/Medications:   Allergies: No Known Allergies  (Not in a hospital admission)      Review of Systems:   Review of Systems:   Vision normal. Ear nose and throat normal. Bowel normal. Bladder function normal. No depression. No thyroid disease. No lymphatic system concerns.  No rash. Muscles and joints unremarkable. No weight loss no weight gain.    Physical Exam:   Vital Signs:  Blood pressure 127/87, pulse 88, temperature 98 °F (36.7 °C), temperature source Temporal, resp. rate 24, height 5' 11\" (1.803 m), weight 214 lb 4.6 oz (97.2 kg), SpO2 97%.     Alert black male  HEENT examination is unremarkable with pupils equal round and reactive to light and accommodation.   Neck without adenopathy, thyromegaly, JVD nor bruit.   Lungs clear to auscultation and percussion.  Cardiac regular rate and rhythm no murmur.   Abdomen nontender, without hepatosplenomegaly and no mass appreciable.   Extremities without clubbing cyanosis nor edema.  No palpable cord.  Neurologic grossly intact with symmetric tone and strength and reflex.  Skin without gross abnormality    Results:     Lab Results   Component Value Date    WBC 6.4 05/03/2024    HGB 14.7 05/03/2024    HCT 43.9 05/03/2024    .0 05/03/2024    CREATSERUM 1.35 05/03/2024    BUN 9 05/03/2024     05/03/2024    K 4.6 05/03/2024     05/03/2024    CO2 24.0 05/03/2024    GLU 97 05/03/2024    CA 9.7 05/03/2024    PTT 27.6 05/03/2024    INR 1.19 05/03/2024     CT scan of the chest-Saddle pulmonary embolism involving the main pulmonary artery as well as extensive pulmonary emboli within bilateral lobar arteries and more distal branches, with associated right heart strain.     Chest x-ray-normal    Assessment/Plan:   1.  Saddle pulmonary embolism-patient is hemodynamically stable but the CT scan of the chest preliminarily suggest right heart strain as does the echo preliminarily.  The CT scan of the chest is extremely impressive for large burden of central thrombus.  I discussed issues with Dr. Paramjit Hayden who will evaluate for catheter directed thrombolytic  intervention.    Recommendations:  1.  Catheter directed thrombolysis  2.  Heparin for now  3.  Will monitor in the intensive care unit  4.  Await lower extremity venous ultrasound  5.  Will follow clinically.    2.  Diabetes mellitus-insulin    3.  Sleep apnea-PAP therapy nightly    I am delighted to assist with Niko's care.    Valeriano Ellison MD  Medical Director, Critical Care, [facility]  Medical Director, [facility]  Pager: [phone]

## 2024-05-05 LAB
APTT PPP: 85.9 SECONDS (ref 23.3–35.6)
BASOPHILS # BLD AUTO: 0.02 X10(3) UL (ref 0–0.2)
BASOPHILS NFR BLD AUTO: 0.4 %
DEPRECATED RDW RBC AUTO: 43.5 FL (ref 35.1–46.3)
EOSINOPHIL # BLD AUTO: 0.12 X10(3) UL (ref 0–0.7)
EOSINOPHIL NFR BLD AUTO: 2.1 %
ERYTHROCYTE [DISTWIDTH] IN BLOOD BY AUTOMATED COUNT: 12.8 % (ref 11–15)
GLUCOSE BLDC GLUCOMTR-MCNC: 101 MG/DL (ref 70–99)
GLUCOSE BLDC GLUCOMTR-MCNC: 117 MG/DL (ref 70–99)
GLUCOSE BLDC GLUCOMTR-MCNC: 129 MG/DL (ref 70–99)
GLUCOSE BLDC GLUCOMTR-MCNC: 90 MG/DL (ref 70–99)
HCT VFR BLD AUTO: 35.5 %
HGB BLD-MCNC: 12 G/DL
IMM GRANULOCYTES # BLD AUTO: 0.01 X10(3) UL (ref 0–1)
IMM GRANULOCYTES NFR BLD: 0.2 %
LYMPHOCYTES # BLD AUTO: 2.26 X10(3) UL (ref 1–4)
LYMPHOCYTES NFR BLD AUTO: 40.1 %
MCH RBC QN AUTO: 31.2 PG (ref 26–34)
MCHC RBC AUTO-ENTMCNC: 33.8 G/DL (ref 31–37)
MCV RBC AUTO: 92.2 FL
MONOCYTES # BLD AUTO: 0.69 X10(3) UL (ref 0.1–1)
MONOCYTES NFR BLD AUTO: 12.3 %
NEUTROPHILS # BLD AUTO: 2.53 X10 (3) UL (ref 1.5–7.7)
NEUTROPHILS # BLD AUTO: 2.53 X10(3) UL (ref 1.5–7.7)
NEUTROPHILS NFR BLD AUTO: 44.9 %
PLATELET # BLD AUTO: 94 10(3)UL (ref 150–450)
PLATELETS.RETICULATED NFR BLD AUTO: 9.5 % (ref 0–7)
RBC # BLD AUTO: 3.85 X10(6)UL
WBC # BLD AUTO: 5.6 X10(3) UL (ref 4–11)

## 2024-05-05 PROCEDURE — 99233 SBSQ HOSP IP/OBS HIGH 50: CPT | Performed by: HOSPITALIST

## 2024-05-05 PROCEDURE — 99233 SBSQ HOSP IP/OBS HIGH 50: CPT | Performed by: INTERNAL MEDICINE

## 2024-05-05 NOTE — PLAN OF CARE
Patient is A&Ox4, on room air, no complaints of pain. 500 bolus given. Bed is locked and in lowest position. Call light is within reach.   Problem: Patient Centered Care  Goal: Patient preferences are identified and integrated in the patient's plan of care  Description: Interventions:  - What would you like us to know as we care for you?   - Provide timely, complete, and accurate information to patient/family  - Incorporate patient and family knowledge, values, beliefs, and cultural backgrounds into the planning and delivery of care  - Encourage patient/family to participate in care and decision-making at the level they choose  - Honor patient and family perspectives and choices  Outcome: Progressing     Problem: Diabetes/Glucose Control  Goal: Glucose maintained within prescribed range  Description: INTERVENTIONS:  - Monitor Blood Glucose as ordered  - Assess for signs and symptoms of hyperglycemia and hypoglycemia  - Administer ordered medications to maintain glucose within target range  - Assess barriers to adequate nutritional intake and initiate nutrition consult as needed  - Instruct patient on self management of diabetes  Outcome: Progressing     Problem: Patient/Family Goals  Goal: Patient/Family Long Term Goal  Description: Patient's Long Term Goal: to go home    Interventions:  - follow MD order  - See additional Care Plan goals for specific interventions  Outcome: Progressing  Goal: Patient/Family Short Term Goal  Description: Patient's Short Term Goal:     Interventions:   -   - See additional Care Plan goals for specific interventions  Outcome: Progressing     Problem: CARDIOVASCULAR - ADULT  Goal: Maintains optimal cardiac output and hemodynamic stability  Description: INTERVENTIONS:  - Monitor vital signs, rhythm, and trends  - Monitor for bleeding, hypotension and signs of decreased cardiac output  - Evaluate effectiveness of vasoactive medications to optimize hemodynamic stability  - Monitor  arterial and/or venous puncture sites for bleeding and/or hematoma  - Assess quality of pulses, skin color and temperature  - Assess for signs of decreased coronary artery perfusion - ex. Angina  - Evaluate fluid balance, assess for edema, trend weights  Outcome: Progressing  Goal: Absence of cardiac arrhythmias or at baseline  Description: INTERVENTIONS:  - Continuous cardiac monitoring, monitor vital signs, obtain 12 lead EKG if indicated  - Evaluate effectiveness of antiarrhythmic and heart rate control medications as ordered  - Initiate emergency measures for life threatening arrhythmias  - Monitor electrolytes and administer replacement therapy as ordered  Outcome: Progressing     Problem: RESPIRATORY - ADULT  Goal: Achieves optimal ventilation and oxygenation  Description: INTERVENTIONS:  - Assess for changes in respiratory status  - Assess for changes in mentation and behavior  - Position to facilitate oxygenation and minimize respiratory effort  - Oxygen supplementation based on oxygen saturation or ABGs  - Provide Smoking Cessation handout, if applicable  - Encourage broncho-pulmonary hygiene including cough, deep breathe, Incentive Spirometry  - Assess the need for suctioning and perform as needed  - Assess and instruct to report SOB or any respiratory difficulty  - Respiratory Therapy support as indicated  - Manage/alleviate anxiety  - Monitor for signs/symptoms of CO2 retention  Outcome: Progressing

## 2024-05-05 NOTE — PROGRESS NOTES
[facility]  part of [facility]    Progress Note      Assessment and Plan:   1.  Saddle pulmonary embolism-the patient is doing well status post suction thrombectomy.  Did not get tPA.  Now on heparin drip.  Doing well clinically.  The patient has nonocclusive DVT in the distal superficial femoral vein and popliteal veins on the left.    Continues to do well and I will transition to Xarelto today.     Recommendations:  1.  Okay to floor transfer  2.  Discontinue heparin and initiate Xarelto  3.  Will follow clinically.     2.  Diabetes mellitus-insulin     3.  Sleep apnea-PAP therapy nightly      Subjective:   Niko Mast is a(n) 58 year old male who is breathing better    Objective:   Blood pressure 91/64, pulse 71, temperature 97.9 °F (36.6 °C), temperature source Temporal, resp. rate 16, height 5' 11\" (1.803 m), weight 212 lb 8.4 oz (96.4 kg), SpO2 96%.    Physical Exam alert male  HEENT examination is unremarkable with pupils equal round and reactive to light and accommodation.   Neck without adenopathy, thyromegaly, JVD nor bruit.   Lungs clear to auscultation and percussion.  Cardiac regular rate and rhythm no murmur.   Abdomen nontender, without hepatosplenomegaly and no mass appreciable.   Extremities without clubbing cyanosis nor edema.   Neurologic grossly intact with symmetric tone and strength and reflex.  Skin without gross abnormality     Results:     Lab Results   Component Value Date    WBC 5.6 05/05/2024    HGB 12.0 05/05/2024    HCT 35.5 05/05/2024    PLT 94.0 05/05/2024    PTT 85.9 05/05/2024   Lower extremity venous ultrasound-Nonocclusive DVT in the distal superficial femoral vein and popliteal veins.       Valeriano Ellison MD  Medical Director, Critical Care, [facility]  Medical Director, [facility]  Pager: [phone]

## 2024-05-05 NOTE — PROGRESS NOTES
[facility]  part of [facility]  Hospitalist Progress Note     Niko Mast Patient Status:  Inpatient    1965  58 year old CSN [id number]   Location 222-A Attending Amish Teague MD   Hosp Day # 2 PCP Dawson Means,      Assessment & Plan:   ----------------------------------  Pulmonary embolism, submassive, saddle.  Contributing factors include sedentary lifestyle, history of venous thromboembolism.  Hypoxia is present.  RV strain/heart failure is present.  Lower extremity DVTs present in left lower extremity.  Status post thrombectomy 5/3 by IR  -Anticoagulation: Xarelto started , heparin drip off  -Pulmonology consultation appreciated  -Supplemental oxygen as needed, titrate down as tolerated  -Hypercoagulable work-up not indicated  -Echo noted to have RV systolic failure  -Telemetry  -May benefit from having lifelong anticoagulation  -Coverage confirmed by social work, however not in stock until tomorrow    DVT. See above    Other problems  Obstructive sleep apnea  DJD  Glaucoma  Carotid arthrosclerosis  Abnormal troponin, due to PE    dvt prophylaxis: Xarelto  code status: full code  dispo: Home tomorrow    I personally reviewed the available laboratories, imaging including CT chest. I discussed/will discuss the case with pulmonology. I ordered laboratories, studies including PTT. I adjusted medications including heparin drip. Medical decision making high, risk is high.    Subjective:   ----------------------------------  Breathing is a little bit better today.  No further pleuritic chest pain.  Doing well on room air.  Tolerating diet.  No abnormal bleeding.      Objective:   Chief Complaint:   Chief Complaint   Patient presents with    Chest Pain     ----------------------------------  Temp:  [97 °F (36.1 °C)-99.2 °F (37.3 °C)] 97.9 °F (36.6 °C)  Pulse:  [] 71  Resp:  [14-21] 16  BP: ()/(64-90) 91/64  SpO2:  [92 %-98 %] 96 %  Gen: A+Ox3.  No  distress.   HEENT: NCAT, neck supple, no carotid bruit.  CV: RRR, S1S2, and intact distal pulses. No gallop, rub, murmur.  Pulm: Effort and breath sounds normal. No distress, wheezes, rales, rhonchi.  Abd: Soft, NTND, BS normal, no mass, no HSM, no rebound/guarding.   Neuro: Normal reflexes, CN. Sensory/motor exams grossly normal deficit.   MS: No joint effusions.  No peripheral edema.  Skin: Skin is warm and dry. No rashes, erythema, diaphoresis.   Psych: Normal mood and affect. Calm, cooperative    Labs:  Lab Results   Component Value Date    HGB 12.0 (L) 05/05/2024    WBC 5.6 05/05/2024    PLT 94.0 (L) 05/05/2024     05/04/2024    K 4.0 05/04/2024    CREATSERUM 1.07 05/04/2024    INR 1.19 05/03/2024    AST 27 03/17/2024    ALT 19 03/17/2024          rivaroxaban  15 mg Oral BID with meals    Followed by    [START ON 5/26/2024] rivaroxaban  20 mg Oral Daily with food    latanoprost  1 drop Both Eyes Nightly    pantoprazole  20 mg Oral QAM AC    brimonidine  1 drop Both Eyes BID    insulin aspart  1-5 Units Subcutaneous TID CC       acetaminophen    ondansetron    prochlorperazine    temazepam    glucose **OR** glucose **OR** glucose-vitamin C **OR** dextrose **OR** glucose **OR** glucose **OR** glucose-vitamin C    sodium chloride 0.9%

## 2024-05-05 NOTE — CM/SW NOTE
KHARI received epic secure chat message from MD in regards to eliquis coverage.     Received MDO for Eliquis coverage. KHARI confirmed Rx sent via e-script to pt's pharmacy.     KHARI/JESE contacted pt's [facility]  via phone #: [phone] and spoke to Darrel. Per Darrel, pt's OOP cost is $0.00  [facility] not [facility] it [facility]. MD is aware.       FLYNN to remain available for support and/or discharge planning.     Nakita Escobar, MSW, LSW   x [phone]

## 2024-05-06 VITALS
HEIGHT: 71 IN | OXYGEN SATURATION: 94 % | DIASTOLIC BLOOD PRESSURE: 84 MMHG | HEART RATE: 70 BPM | WEIGHT: 216.94 LBS | RESPIRATION RATE: 16 BRPM | BODY MASS INDEX: 30.37 KG/M2 | TEMPERATURE: 98 F | SYSTOLIC BLOOD PRESSURE: 102 MMHG

## 2024-05-06 LAB
APTT PPP: 40 SECONDS (ref 23.3–35.6)
GLUCOSE BLDC GLUCOMTR-MCNC: 103 MG/DL (ref 70–99)

## 2024-05-06 PROCEDURE — 99239 HOSP IP/OBS DSCHRG MGMT >30: CPT | Performed by: HOSPITALIST

## 2024-05-06 PROCEDURE — 99232 SBSQ HOSP IP/OBS MODERATE 35: CPT | Performed by: INTERNAL MEDICINE

## 2024-05-06 NOTE — PLAN OF CARE
No acute events overnight. Pt patiently waiting for discharge.    Problem: Patient Centered Care  Goal: Patient preferences are identified and integrated in the patient's plan of care  Description: Interventions:  - What would you like us to know as we care for you? \"I feel like I'm just wasting time here. I wanna get up and be able to walk and go home\"  - Provide timely, complete, and accurate information to patient/family  - Incorporate patient and family knowledge, values, beliefs, and cultural backgrounds into the planning and delivery of care  - Encourage patient/family to participate in care and decision-making at the level they choose  - Honor patient and family perspectives and choices  5/6/2024 0643 by Rickey Healy RN  Outcome: Progressing  5/6/2024 0047 by Rickey Healy RN  Outcome: Progressing     Problem: Diabetes/Glucose Control  Goal: Glucose maintained within prescribed range  Description: INTERVENTIONS:  - Monitor Blood Glucose as ordered  - Assess for signs and symptoms of hyperglycemia and hypoglycemia  - Administer ordered medications to maintain glucose within target range  - Assess barriers to adequate nutritional intake and initiate nutrition consult as needed  - Instruct patient on self management of diabetes  5/6/2024 0643 by Rickey Healy RN  Outcome: Progressing  5/6/2024 0047 by Rickey Healy RN  Outcome: Progressing     Problem: Patient/Family Goals  Goal: Patient/Family Long Term Goal  Description: Patient's Long Term Goal: Discharge    Interventions:  - Follow MD orders  - See additional Care Plan goals for specific interventions  5/6/2024 0643 by Rickey Healy RN  Outcome: Progressing  5/6/2024 0047 by Rickey Healy RN  Outcome: Progressing  Goal: Patient/Family Short Term Goal  Description: Patient's Short Term Goal: Improved breathing    Interventions:   - Monitor Spo2  - See additional Care Plan goals for specific interventions  5/6/2024 0643 by Rickey Healy RN  Outcome:  Progressing  5/6/2024 0047 by Rickey Healy RN  Outcome: Progressing     Problem: CARDIOVASCULAR - ADULT  Goal: Maintains optimal cardiac output and hemodynamic stability  Description: INTERVENTIONS:  - Monitor vital signs, rhythm, and trends  - Monitor for bleeding, hypotension and signs of decreased cardiac output  - Evaluate effectiveness of vasoactive medications to optimize hemodynamic stability  - Monitor arterial and/or venous puncture sites for bleeding and/or hematoma  - Assess quality of pulses, skin color and temperature  - Assess for signs of decreased coronary artery perfusion - ex. Angina  - Evaluate fluid balance, assess for edema, trend weights  5/6/2024 0643 by Rickey Healy RN  Outcome: Progressing  5/6/2024 0047 by Rickey Healy RN  Outcome: Progressing  Goal: Absence of cardiac arrhythmias or at baseline  Description: INTERVENTIONS:  - Continuous cardiac monitoring, monitor vital signs, obtain 12 lead EKG if indicated  - Evaluate effectiveness of antiarrhythmic and heart rate control medications as ordered  - Initiate emergency measures for life threatening arrhythmias  - Monitor electrolytes and administer replacement therapy as ordered  5/6/2024 0643 by Rickey Healy RN  Outcome: Progressing  5/6/2024 0047 by Rickey Healy RN  Outcome: Progressing     Problem: RESPIRATORY - ADULT  Goal: Achieves optimal ventilation and oxygenation  Description: INTERVENTIONS:  - Assess for changes in respiratory status  - Assess for changes in mentation and behavior  - Position to facilitate oxygenation and minimize respiratory effort  - Oxygen supplementation based on oxygen saturation or ABGs  - Provide Smoking Cessation handout, if applicable  - Encourage broncho-pulmonary hygiene including cough, deep breathe, Incentive Spirometry  - Assess the need for suctioning and perform as needed  - Assess and instruct to report SOB or any respiratory difficulty  - Respiratory Therapy support as indicated  -  Manage/alleviate anxiety  - Monitor for signs/symptoms of CO2 retention  5/6/2024 0643 by Rickey Healy, RN  Outcome: Progressing  5/6/2024 0047 by Rickey Healy, RN  Outcome: Progressing

## 2024-05-06 NOTE — PROGRESS NOTES
[facility]  part of [facility]    Progress Note      Assessment and Plan:   1.  Saddle pulmonary embolism-the patient is doing well status post suction thrombectomy.  Did not get tPA.  Okay to home today from my perspective.  The patient has nonocclusive DVT as well.  Had a thrombus over a decade ago.    Recommendations:  1.  Home today with NOAC.  Lifelong anticoagulation.  2.  See me in the office in 4 to 6 weeks    2.  Diabetes mellitus-insulin     3.  Sleep apnea-PAP therapy nightly      Subjective:   Niko Mast is a(n) 58 year old male who is breathing better    Objective:   Blood pressure 102/84, pulse 70, temperature 97.8 °F (36.6 °C), temperature source Oral, resp. rate 16, height 5' 11\" (1.803 m), weight 216 lb 14.9 oz (98.4 kg), SpO2 94%.    Physical Exam alert male  HEENT examination is unremarkable with pupils equal round and reactive to light and accommodation.   Neck without adenopathy, thyromegaly, JVD nor bruit.   Lungs clear to auscultation and percussion.  Cardiac regular rate and rhythm no murmur.   Abdomen nontender, without hepatosplenomegaly and no mass appreciable.   Extremities without clubbing cyanosis nor edema.   Neurologic grossly intact with symmetric tone and strength and reflex.  Skin without gross abnormality     Results:     Lab Results   Component Value Date    PTT 40.0 05/06/2024   Lower extremity venous ultrasound-Nonocclusive DVT in the distal superficial femoral vein and popliteal veins.       Valeriano Ellison MD  Medical Director, Critical Care, [facility]  Medical Director, [facility]  Pager: [phone]

## 2024-05-06 NOTE — PLAN OF CARE
Pt Aox4. Ambulated in hallway and tolerated well. Pt off hep gtt, started on xarelto. Pt with callight within reach, safety maintained.  Problem: Diabetes/Glucose Control  Goal: Glucose maintained within prescribed range  Description: INTERVENTIONS:  - Monitor Blood Glucose as ordered  - Assess for signs and symptoms of hyperglycemia and hypoglycemia  - Administer ordered medications to maintain glucose within target range  - Assess barriers to adequate nutritional intake and initiate nutrition consult as needed  - Instruct patient on self management of diabetes  Outcome: Progressing     Problem: CARDIOVASCULAR - ADULT  Goal: Maintains optimal cardiac output and hemodynamic stability  Description: INTERVENTIONS:  - Monitor vital signs, rhythm, and trends  - Monitor for bleeding, hypotension and signs of decreased cardiac output  - Evaluate effectiveness of vasoactive medications to optimize hemodynamic stability  - Monitor arterial and/or venous puncture sites for bleeding and/or hematoma  - Assess quality of pulses, skin color and temperature  - Assess for signs of decreased coronary artery perfusion - ex. Angina  - Evaluate fluid balance, assess for edema, trend weights  Outcome: Progressing  Goal: Absence of cardiac arrhythmias or at baseline  Description: INTERVENTIONS:  - Continuous cardiac monitoring, monitor vital signs, obtain 12 lead EKG if indicated  - Evaluate effectiveness of antiarrhythmic and heart rate control medications as ordered  - Initiate emergency measures for life threatening arrhythmias  - Monitor electrolytes and administer replacement therapy as ordered  Outcome: Progressing     Problem: RESPIRATORY - ADULT  Goal: Achieves optimal ventilation and oxygenation  Description: INTERVENTIONS:  - Assess for changes in respiratory status  - Assess for changes in mentation and behavior  - Position to facilitate oxygenation and minimize respiratory effort  - Oxygen supplementation based on oxygen  saturation or ABGs  - Provide Smoking Cessation handout, if applicable  - Encourage broncho-pulmonary hygiene including cough, deep breathe, Incentive Spirometry  - Assess the need for suctioning and perform as needed  - Assess and instruct to report SOB or any respiratory difficulty  - Respiratory Therapy support as indicated  - Manage/alleviate anxiety  - Monitor for signs/symptoms of CO2 retention  Outcome: Progressing

## 2024-05-06 NOTE — CM/SW NOTE
05/06/24 0800   Discharge disposition   Expected discharge disposition Home or Self   Discharge transportation Private car     SW/CM contacted pt's [facility] via phone #: [phone] and spoke to Darrel. Per Darrel, Xarelto prescription will be ready for  this afternoon.  Medication instructions included on AVS.    Cleared for dc from CM/KHARI standpoint.    Gemini Smiley RN, BSN  Nurse   [phone]

## 2024-05-06 NOTE — PROGRESS NOTES
[facility]  part of [facility]  Progress Note      Niko Mast Patient Status:  Inpatient    1965 MRN [id number]   Location [facility] 2W/SW Attending Amish Teague MD   Hosp Day # 3 PCP Dawson Means DO       Subjective:   Comfortable. No sob or chest pain.    Objective:   Physical Exam:   General: Alert, orientated x3.  Cooperative.  No apparent distress.  Vital Signs:  Blood pressure 102/84, pulse 70, temperature 97.8 °F (36.6 °C), temperature source Oral, resp. rate 16, height 71\", weight 216 lb 14.9 oz (98.4 kg), SpO2 94%.  HEENT: Exam is unremarkable.  Lungs: Normal respiratory effort. Room air.  Cardiac: Regular rate.  Abdomen: No guarding.  Extremities:  No lower extremity edema noted.  Skin:R groin site without redness/drainage/swelling    Results:     Recent Labs   Lab 24  1533 24  0453 24  0442   RBC 4.79 4.43 3.85*   HGB 14.7 13.6 12.0*   HCT 43.9 40.0 35.5*   MCV 91.6 90.3 92.2   MCH 30.7 30.7 31.2   MCHC 33.5 34.0 33.8   RDW 13.2 13.0 12.8   NEPRELIM 3.26 3.67 2.53   WBC 6.4 6.3 5.6   .0* 96.0* 94.0*       Recent Labs   Lab 24  1533 24  0453   GLU 97 103*   BUN 9 9   CREATSERUM 1.35* 1.07   EGFRCR 61 81   CA 9.7 9.0    138   K 4.6 4.0    107   CO2 24.0 24.0     US VENOUS DOPPLER LEG BILAT - DIAG IMG (CPT=93970)    Result Date: 2024  CONCLUSION: Positive for DVT in the left lower extremity      Dictated by (CST): Jono Bains MD on 2024 at 9:16 AM     Finalized by (CST): Jono Bains MD on 2024 at 9:18 AM             Assessment and Plan:   57 yo male with saddle PE with right heart strain s/p pulmonary thrombectomy on 5/3/24  Transitioned to xarelto, stable, on room air  R groin suture removed, maintain dry dressing until site is closed    Maggy Michael, PRANAY  2024  8:58 AM

## 2024-05-07 ENCOUNTER — PATIENT OUTREACH (OUTPATIENT)
Dept: CASE MANAGEMENT | Age: 59
End: 2024-05-07

## 2024-05-07 DIAGNOSIS — I26.92 ACUTE SADDLE PULMONARY EMBOLISM, UNSPECIFIED WHETHER ACUTE COR PULMONALE PRESENT (HCC): Primary | ICD-10-CM

## 2024-05-07 DIAGNOSIS — Z02.9 ENCOUNTERS FOR UNSPECIFIED ADMINISTRATIVE PURPOSE: ICD-10-CM

## 2024-05-07 PROCEDURE — 1111F DSCHRG MED/CURRENT MED MERGE: CPT

## 2024-05-07 NOTE — PAYOR COMM NOTE
--------------  DISCHARGE REVIEW    Payor: [facility]  Subscriber #:  [id number]  Authorization Number: [id number]    Admit date: 5/3/24  Admit time:  10:37 PM  Discharge Date: 2024 10:22 AM     Admitting Physician: Shane Cates MD  Attending Physician:  No att. providers found  Primary Care Physician: Dwason Means DO          Discharge Summary Notes        Discharge Summary signed by Amish Teague MD at 2024  2:12 PM       Author: Amish Teague MD Specialty: HOSPITALIST Author Type: Physician    Filed: 2024  2:12 PM Date of Service: 2024  2:10 PM Status: Signed    : Amish Teague MD (Physician)           [facility]  part of [facility]     DISCHARGE SUMMARY     Niko Mast Patient Status:  Inpatient    1965 MRN [id number]   Location [facility] 2W/ Attending No att. providers found   Hosp Day # 3 PCP Dawson Means DO     DATE OF ADMISSION: 5/3/2024  DATE OF DISCHARGE: 2024   DISPOSITION: home  CONDITION ON DISCHARGE: good    DISCHARGE DIAGNOSES:  Pulmonary embolism, submassive, saddle  DVT LLE  Obstructive sleep apnea  DJD  Glaucoma  Carotid arthrosclerosis  Abnormal troponin, due to PE    HISTORY OF PRESENT ILLNESS (COPIED FROM ADMISSION H&P)   Patient is a 58-year-old  male who has been having discomfort in his left calf area for the last 2 weeks and around yesterday started developing progressive shortness of breath and pleuritic-type chest pain.  Came in today to the emergency department for evaluation.  Upon arrival was noted to have systolic blood pressure of 113, pulse ox 97% on room air.  Troponin was 424.  CT scan of the chest, rule out pulmonary embolism, showed saddle pulmonary embolism involving the left main pulmonary artery as well as extensive bilateral pulmonary emboli in lobar and distal branches with right heart strain.  Chest x-ray showed no acute findings.  Started on IV heparin.  Stat 2D  echocardiogram with Doppler was ordered.  Patient will be admitted to the hospital for further management.      Patient said he had right total hip arthroplasty in February 2024, and he has been participating in physical therapy.  Around 1 week or 2 ago, started developing tightness in his left calf area.  Yesterday night, started feeling a bit of short of breath, and today he was dyspneic without exertion and decided to come into the emergency department for evaluation.  Other 12-point review of systems is negative.       HOSPITAL COURSE:  Patient was admitted, placed on heparin drip.  Seen by interventional radiology for direct thrombectomy which was done emergently.  The patient did well, right ventricular strain improved.  Continued on heparin drip and did very well.  Did require supplemental oxygen for short period of time but this improved.  Left lower extremity DVT also identified.  Patient switched to Xarelto.  Given the size of this pulmonary embolism and his history of a previous embolism, lifelong anticoagulation will be recommended.  He can discuss this further with his primary care physician.  Patient seen by pulmonology on the day of discharge and cleared to go home.  Extensive discussion on risk benefits and alternatives to anticoagulation prior to discharge.  Patient voiced understanding.    Patient understands return to the emergency room for increased pain, fever, discharge, shortness of breath, chest pain, new neurologic symptoms, other concerning symptoms.    PHYSICAL EXAM:  Temp:  [97.8 °F (36.6 °C)-98.4 °F (36.9 °C)] 97.8 °F (36.6 °C)  Pulse:  [70-86] 70  Resp:  [12-20] 16  BP: (102-112)/(68-84) 102/84  SpO2:  [94 %-100 %] 94 %  Gen: A+Ox3.  No distress.   HEENT: NCAT, neck supple, no carotid bruit.  CV: RRR, S1S2, and intact distal pulses. No gallop, rub, murmur.  Pulm: Effort and breath sounds normal. No distress, wheezes, rales, rhonchi.  Abd: Soft, NTND, BS normal, no mass, no HSM, no  rebound/guarding.   Neuro: Normal reflexes, CN. Sensory/motor exams grossly normal deficit. Coordination  and gait normal.   MS: No joint effusions.  No peripheral edema.  Skin: Skin is warm and dry. No rashes, erythema, diaphoresis.   Psych: Normal mood and affect. Behavior and judgment normal.     DISCHARGE MEDICATIONS     Discharge Medications        START taking these medications        Instructions Prescription details   rivaroxaban 15 & 20 MG Tbpk      Take As Directed based on package instructions: Days 1-21: 15 mg by mouth twice daily Days 22-30: 20 mg by mouth once daily   Quantity: 51 each  Refills: 0            CONTINUE taking these medications        Instructions Prescription details   brimonidine 0.2 % Soln  Commonly known as: Alphagan      Place 1 drop into both eyes in the morning and 1 drop before bedtime.   Refills: 0     latanoprost 0.005 % Soln  Commonly known as: Xalatan      INSTILL 1 DROP IN BOTH EYE(S) DAILY AT BEDTIME   Refills: 0     Mounjaro 5 MG/0.5ML Sopn  Generic drug: Tirzepatide      Inject 5 mg into the skin once a week.   Quantity: 2 mL  Refills: 3     Multi For Him 50+ Tabs      Take by mouth.   Refills: 0     OneTouch Delica Lancets 33G Misc      Test 2 x daily   Quantity: 200 each  Refills: 0     OneTouch Ultra 2 w/Device Kit      1 strip by In Vitro route daily.   Quantity: 200 kit  Refills: 0     OneTouch Verio Strp      Test 2x daily   Quantity: 200 strip  Refills: 0     pantoprazole 20 MG Tbec  Commonly known as: Protonix      Take 1 tablet (20 mg total) by mouth every morning before breakfast.   Quantity: 90 tablet  Refills: 0     Vitamin D 50 MCG (2000 UT) Caps      Take by mouth.   Refills: 0            STOP taking these medications      ibuprofen 100 MG Tabs        simvastatin 20 MG Tabs  Commonly known as: Zocor        traMADol 50 MG Tabs  Commonly known as: Ultram                  Where to Get Your Medications        These medications were sent to [facility]/pharmacy #[id number] -  [place], [place] - [address] AT [facility], [phone], [phone]  [address] [place] [address]      Phone: [phone]   rivaroxaban 15 & 20 MG Tbpk         CONSULTANTS  Consultants         Provider   Role Specialty     Valeriano Ellison MD      Consulting Physician PULMONARY DISEASES     Paramjit Hayden MD      Consulting Physician INTERVENTIONAL, RADIOLOGY            FOLLOW UP:  Valeriano Ellison MD  [address]  [facility] [id number] [phone]    Schedule an appointment as soon as possible for a visit in 3 day(s)  F/u 3-4 weeks    The above plan and follow-up instructions were reviewed with the patient and they verbalized understanding and agreement.  They understand to return to the emergency room for any concerning signs or symptoms.  Greater than 30 minutes spent on discharge.  -----------------------    Hospital Discharge Diagnoses:  PE    Lace+ Score: 41  59-90 High Risk  29-58 Medium Risk  0-28   Low Risk.    TCM Follow-Up Recommendation:  LACE 29-58: Moderate Risk of readmission after discharge from the hospital.    Electronically signed by Amish Teague MD on 5/6/2024  2:12 PM         REVIEWER COMMENTS

## 2024-05-07 NOTE — PROGRESS NOTES
NCM attempted to reach the patient to complete a TCM/HFU call. Left message to call back. [facility] provided direct contact info at [phone].

## 2024-05-07 NOTE — PAYOR COMM NOTE
--------------  ADMISSION REVIEW     Payor: [facility]  Subscriber #:  [id number]  Authorization Number: [id number]    Admit date: 5/3/24  Admit time: 10:37 PM       REVIEW DOCUMENTATION:     ED Provider Notes        ED Provider Notes signed by Qamar Schaeffer MD at 5/3/2024  7:58 PM       Author: Qamar Schaeffer MD Service: -- Author Type: Physician    Filed: 5/3/2024  7:58 PM Date of Service: 5/3/2024  3:33 PM Status: Addendum    : Qamar Schaeffer MD (Physician)    Related Notes: Original Note by Qamar Schaeffer MD (Physician) filed at 5/3/2024  6:32 PM           Patient Seen in: [facility] Emergency Department      History     Chief Complaint   Patient presents with    Chest Pain     Stated Complaint: Chest Pain    Subjective:   HPI    Patient is a 58-year-old male with history as listed below.  He states today he started to experience exertional chest discomfort and shortness of breath.  He states when he climbs stairs he had tightness in his chest and felt short of breath when he sat down his symptoms improved.  No fever no cough.  No diaphoresis no nausea no indigestion    Patient had a hip replacement surgery February 1 he was on Eliquis briefly after that    Objective:   Past Medical History:    Anxiety    Carotid stenosis    Pulmonary embolism (HCC)    Sleep apnea    CPAP    Sleep apnea, obstructive    mild              Past Surgical History:   Procedure Laterality Date    Colonoscopy      Colonoscopy      Colonoscopy N/A 11/20/2019    Procedure: COLONOSCOPY;  Surgeon: Johann Coelho MD;  Location: [facility] ENDOSCOPY    Shoulder surg proc unlisted Left                 Social History     Socioeconomic History    Marital status:    Tobacco Use    Smoking status: Never    Smokeless tobacco: Never   Vaping Use    Vaping status: Never Used   Substance and Sexual Activity    Alcohol use: Yes     Alcohol/week: 2.0 standard drinks of alcohol     Types: 2 Glasses of wine per week     Comment: on ocassion     Drug use:  No   Other Topics Concern    Caffeine Concern No     Comment: tea    Exercise Yes              Review of Systems    Positive for stated complaint: Chest Pain  Other systems are as noted in HPI.  Constitutional and vital signs reviewed.      All other systems reviewed and negative except as noted above.    Physical Exam     ED Triage Vitals [05/03/24 1456]   /90   Pulse 108   Resp 26   Temp 98 °F (36.7 °C)   Temp src Temporal   SpO2 97 %   O2 Device None (Room air)       Current:/87   Pulse 88   Temp 98 °F (36.7 °C) (Temporal)   Resp 24   Ht 180.3 cm (5' 11\")   Wt 97.2 kg   SpO2 97%   BMI 29.89 kg/m²         Physical Exam    Constitutional: Oriented to person, place, and time. Appears well-developed and well-nourished.   HEENT:   Head: Normocephalic and atraumatic.   Right Ear: External ear normal.   Left Ear: External ear normal.   Nose: Nose normal.   Mouth/Throat: Oropharynx is clear and moist.   Eyes: Conjunctivae and EOM are normal. Pupils are equal, round, and reactive to light.   Neck: Neck supple.   Cardiovascular: Normal rate, regular rhythm, normal heart sounds and intact distal pulses.    Pulmonary/Chest: Effort normal and breath sounds normal. No respiratory distress.   Abdominal: Soft. Bowel sounds are normal. Exhibits no distension and no mass. There is no tenderness. There is no rebound and no guarding.   Musculoskeletal: Normal range of motion. Exhibits no edema or tenderness.   Lymphadenopathy: No cervical adenopathy.   Neurological: Alert and oriented to person, place, and time. Normal reflexes. No cranial nerve deficit. No motor os sensory defecits noted Coordination normal.   Skin: Skin is warm and dry.   Psychiatric: Normal mood and affect. Behavior is normal. Judgment and thought content normal.   Nursing note and vitals reviewed.        ED Course     Labs Reviewed   BASIC METABOLIC PANEL (8) - Abnormal; Notable for the following components:       Result Value    Creatinine  1.35 (*)     BUN/CREA Ratio 6.7 (*)     All other components within normal limits   TROPONIN I HIGH SENSITIVITY - Abnormal; Notable for the following components:    Troponin I (High Sensitivity) 424 (*)     All other components within normal limits   LIPID PANEL - Abnormal; Notable for the following components:    HDL Cholesterol 39 (*)     All other components within normal limits   PROTHROMBIN TIME (PT) - Abnormal; Notable for the following components:    PT 15.8 (*)     All other components within normal limits   CBC W/ DIFFERENTIAL - Abnormal; Notable for the following components:    .0 (*)     Immature Platelet Fraction 8.5 (*)     All other components within normal limits   PTT, ACTIVATED - Normal   CBC WITH DIFFERENTIAL WITH PLATELET    Narrative:     The following orders were created for panel order CBC With Differential With Platelet.  Procedure                               Abnormality         Status                     ---------                               -----------         ------                     CBC W/ DIFFERENTIAL[[id number]]          Abnormal            Final result                 Please view results for these tests on the individual orders.   SCAN SLIDE   RAINBOW DRAW BLUE     EKG    Rate, intervals and axes as noted on EKG Report.  Rate: 110  Rhythm: Sinus Rhythm  Reading: Sinus tachycardia otherwise normal                         MDM      Use of independent historian:     I personally reviewed and interpreted the images : No infiltrate seen on chest x-ray    CT CHEST PE AORTA (IV ONLY) (CPT=71260)    Result Date: 5/3/2024  CONCLUSION:   Saddle pulmonary embolism involving the main pulmonary artery as well as extensive pulmonary emboli within bilateral lobar arteries and more distal branches, with associated right heart strain.  This was communicated to Dr. Qamar Schaeffer on 05/03/2024 at 6:18  p.m.      Dictated by (CST): Jaqui Ohara MD on 5/03/2024 at 6:18 PM     Finalized by (CST):  Jaqui Ohara MD on 5/03/2024 at 6:24 PM          XR CHEST AP PORTABLE  (CPT=71045)    Result Date: 5/3/2024  CONCLUSION: Normal examination.     Dictated by (CST): Kaden Salinas MD on 5/03/2024 at 3:31 PM     Finalized by (CST): Kaden Salinas MD on 5/03/2024 at 3:32 PM           Vitals:    05/03/24 1700 05/03/24 1730 05/03/24 1815 05/03/24 1901   BP: 123/86 120/87 127/87    Pulse: 93 90 88    Resp: 21 22 24    Temp:       TempSrc:       SpO2: 98% 97% 97%    Weight:    97.2 kg   Height:         *I personally reviewed and interpreted all ED vitals.    Pulse Ox: 97%, Room air, Normal     EKG interpretation above independently interpreted by me    Monitor Interpretation:   sinus tachycardia independently interpreted by me    Differential Diagnosis/ Diagnostic Considerations: Patient with exertional chest pain consider ACS consider pneumonia consider PE    Medical Record Review: I personally reviewed available prior medical records for any recent pertinent discharge summaries, testing, and procedures and reviewed those reports and found office visit 4/30/2024 with orthopedist 13 weeks postop visit unremarkable..    Complicating Factors: The patient already has history of PE in the past which contribute to the complexity of this ED evaluation.    Social determinants of health:    Prescription drug management:      Shared Decision Making:    ED Course: CT findings noted.  Discussed case with Dr. Du from pulmonary Dr. Ludwig from interventional radiology Dr. Cates the hospitalist heparin initiated.  Will admit to CCU.  Echo ordered.  Patient remains hemodynamically stable with O2 sats of 96% on room air.    Discussion of management with other healthcare providers: Patient seen in the ED by Dr. Cates and Dr. Du.    Condition upon leaving the department: Stable    Admission disposition: 5/3/2024  7:01 PM           I spent a total of 45 minutes of critical care time in obtaining history, performing a physical  exam, bedside monitoring of interventions, collecting and interpreting tests and discussion with consultants but not including time spent performing procedures.                               Medical Decision Making      Disposition and Plan     Clinical Impression:  1. Acute saddle pulmonary embolism, unspecified whether acute cor pulmonale present (HCC)         Disposition:  Admit  5/3/2024  7:01 pm    Follow-up:  No follow-up provider specified.        Medications Prescribed:  Current Discharge Medication List                            Hospital Problems       Present on Admission  Date Reviewed: 3/25/2024            ICD-10-CM Noted POA    * (Principal) Acute saddle pulmonary embolism, unspecified whether acute cor pulmonale present (HCC) I26.92 5/3/2024 Unknown                     Signed by Qamar Schaeffer MD on 5/3/2024  7:58 PM     HISTORY AND PHYSICAL EXAMINATION   ASSESSMENT:    1.       Saddle pulmonary embolism with right ventricular strain.   2.       Abnormal troponin secondary to above.     PLAN:  Patient will be admitted to progressive care unit.  Complete bedrest.  Monitor hemodynamic and respiratory status.  Obtain venous Doppler study, both lower extremities.  A 2D echocardiogram with Doppler was ordered.  Pulmonary and interventional radiology consults to evaluate patient for possible tPA catheter infusion.  IV heparin was ordered and started in the emergency room.  Further recommendations to follow.   PULMONARY DISEASES Consult Note   Assessment/Plan:   1.  Saddle pulmonary embolism-patient is hemodynamically stable but the CT scan of the chest preliminarily suggest right heart strain as does the echo preliminarily.  The CT scan of the chest is extremely impressive for large burden of central thrombus.  I discussed issues with Dr. Paramjit Hayden who will evaluate for catheter directed thrombolytic intervention.     Recommendations:  1.  Catheter directed thrombolysis  2.  Heparin for now  3.  Will monitor  in the intensive care unit  4.  Await lower extremity venous ultrasound  5.  Will follow clinically.     2.  Diabetes mellitus-insulin     3.  Sleep apnea-PAP therapy nightly  5/4  Hospitalist Progress Note   Assessment & Plan:  ----------------------------------  Pulmonary embolism, submassive, saddle.  Contributing factors include sedentary lifestyle, history of venous thromboembolism.  Hypoxia is present.  RV strain/heart failure is present.  Lower extremity DVTs present in left lower extremity.  I discussed with the patient, family at length regarding the risks, benefits, alternatives to anticoagulation.  We discussed the risks of bleeding including possible life-threatening bleeding.  We discussed that this must be weighed against risk of clot progression, migration which could result in significant morbidity, sudden death.  They understand and wish to proceed with anticoagulation.  Status post thrombectomy 5/3 by IR  -Anticoagulation: Heparin drip, switch to DOAC per pulmonology  -Pulmonology consultation appreciated  -Supplemental oxygen as needed, titrate down as tolerated  -Hypercoagulable work-up not indicated  -Echo noted to have RV systolic failure  -Telemetry  -May benefit from having lifelong anticoagulation     DVT. See above     Other problems  Obstructive sleep apnea  DJD  Glaucoma  Carotid arthrosclerosis  Abnormal troponin, due to PE     dvt prophylaxis: heparin gtt  code status: full code  dispo: home upon medical clearance  Pulmonology   Progress Note        Assessment and Plan:   1.  Saddle pulmonary embolism-the patient is doing well status post suction thrombectomy.  Did not get tPA.  Now on heparin drip.  Doing well clinically.  Okay to floor transfer.  The patient has nonocclusive DVT in the distal superficial femoral vein and popliteal veins on the left.     Recommendations:  1.  Okay to floor transfer  2.  Heparin for now-likely to NOAC tomorrow  3.  Will follow clinically.     2.   Diabetes mellitus-insulin     3.  Sleep apnea-PAP therapy nightly   5/5  Pulmonology   Progress Note        Assessment and Plan:   1.  Saddle pulmonary embolism-the patient is doing well status post suction thrombectomy.  Did not get tPA.  Now on heparin drip.  Doing well clinically.  The patient has nonocclusive DVT in the distal superficial femoral vein and popliteal veins on the left.     Continues to do well and I will transition to Xarelto today.     Recommendations:  1.  Okay to floor transfer  2.  Discontinue heparin and initiate Xarelto  3.  Will follow clinically.     2.  Diabetes mellitus-insulin     3.  Sleep apnea-PAP therapy nightly  Hospitalist Progress Note   Assessment & Plan:  ----------------------------------  Pulmonary embolism, submassive, saddle.  Contributing factors include sedentary lifestyle, history of venous thromboembolism.  Hypoxia is present.  RV strain/heart failure is present.  Lower extremity DVTs present in left lower extremity.  Status post thrombectomy 5/3 by IR  -Anticoagulation: Xarelto started 5/5, heparin drip off  -Pulmonology consultation appreciated  -Supplemental oxygen as needed, titrate down as tolerated  -Hypercoagulable work-up not indicated  -Echo noted to have RV systolic failure  -Telemetry  -May benefit from having lifelong anticoagulation  -Coverage confirmed by social work, however not in stock until tomorrow     DVT. See above     Other problems  Obstructive sleep apnea  DJD  Glaucoma  Carotid arthrosclerosis  Abnormal troponin, due to PE     dvt prophylaxis: Xarelto  code status: full code  5/6  Interventional Radiology Progress Note   Assessment and Plan:   57 yo male with saddle PE with right heart strain s/p pulmonary thrombectomy on 5/3/24  Transitioned to xarelto, stable, on room air  R groin suture removed, maintain dry dressing until site is closed  Pulmonology   Progress Note        Assessment and Plan:   1.  Saddle pulmonary embolism-the patient is  doing well status post suction thrombectomy.  Did not get tPA.   The patient has nonocclusive DVT as well.  Had a thrombus over a decade ago.     Recommendations:  1.    Lifelong anticoagulation.  2.  See me in the office in 4 to 6 weeks     2.  Diabetes mellitus-insulin     3.  Sleep apnea-PAP therapy nightly        MEDICATIONS ADMINISTERED IN LAST 1 DAY:  brimonidine (Alphagan) 0.2 % ophthalmic solution 1 drop       Date Action Dose Route User    Discharged on 5/6/2024 5/6/2024 0827 Given 1 drop Both Eyes Dinora Huston, RN          rivaroxaban (Xarelto) tab 15 mg       Date Action Dose Route User    Discharged on 5/6/2024 5/6/2024 0826 Given 15 mg Oral Dinora Huston, RN            Vitals (last day) before discharge       Date/Time Temp Pulse Resp BP SpO2 Weight O2 Device O2 Flow Rate (L/min) Who          CIWA Scores (last 4 days) before discharge       None            PLEASE FAX DAYS CERTIFIED AND NEXT REVIEW DATE [phone]

## 2024-05-07 NOTE — CDS QUERY
CLINICAL DOCUMENTATION CLARIFICATION    Dear Dr. Teague,    The medical record indicates that the patient was admitted with saddle pulmonary embolism with right heart strain. Can this diagnosis be further specified?              [  x   ] Acute pulmonary embolism with Acute Cor Pulmonale              [     ] Acute pulmonary embolism without Acute Cor Pulmonale              [     ] Other (please specify):     CLINICAL INDICATORS:  5/3 ED: exertional chest discomfort, shortness of breath started today. Improves with sitting down.    Initial VS: T 98, /90, P 108, R 26, O2 sat 97% on RA    CT Chest: Saddle pulmonary embolism involving the main pulmonary artery as well as extensive pulmonary emboli within bilateral lobar arteries and more distal branches, with associated right heart strain.     5/3 Pulmonology Consult: Saddle pulmonary embolism-patient is hemodynamically stable but the CT scan of the chest preliminarily suggest right heart strain as does the echo preliminarily. The CT scan of the chest is extremely impressive for large burden of central thrombus.     5/3 Procedure Note: Procedure: pulmonary thrombectomy   Pre-Procedure Diagnosis:  saddle PE c R heart strain (submassive)   Post-Procedure Diagnosis: same  Findings:  extensive pulmonary thromboemboli in main, R and L upper and lower lobe branches, cleared c Penumbra thrombectomy,c PA pressures reduced from 43 mm Hg systolic to 32 mm Hg.  L pulmonary arteries completely cleared.of thrombus. There is residual nonocclusive clot in R pulmonary artery, resistatnt to suction thrombectemy      mL    5/6 Discharge Summary: DISCHARGE DIAGNOSES:  Pulmonary embolism, submassive, saddle  DVT LLE  Obstructive sleep apnea  DJD  Glaucoma  Carotid arthrosclerosis  Abnormal troponin, due to PE  Seen by interventional radiology for direct thrombectomy which was done emergently. The patient did well, right ventricular strain  improved.     RISK FACTORS: History of previous PE    TREATMENT: Heparin gtt. Direct thrombectomy by interventional radiology. Discharged on Xarelto.        For questions regarding this query, please contact Clinical Documentation : Cathy Dixon RN  Cell# [phone]     Thank you!                                                               THIS FORM IS A PERMANENT PART OF THE MEDICAL RECORD

## 2024-05-07 NOTE — PROGRESS NOTES
Left message on mailbox for pt to call [facility] back for TCM.  [facility] contact information [phone] included in message.

## 2024-05-08 ENCOUNTER — TELEPHONE (OUTPATIENT)
Dept: PULMONOLOGY | Facility: CLINIC | Age: 59
End: 2024-05-08

## 2024-05-08 LAB
ISTAT ACTIVATED CLOTTING TIME: 196 SECONDS (ref 125–137)
ISTAT ACTIVATED CLOTTING TIME: 212 SECONDS (ref 125–137)

## 2024-05-08 NOTE — PROGRESS NOTES
Initial Post Discharge Follow Up   Discharge Date: 5/6/24  Contact Date: 5/8/2024    Consent Verification:  Assessment Completed With: Patient  HIPAA Verified?  Yes    Discharge Dx:   Pulmonary embolism, submassive, saddle  DVT LLE  Obstructive sleep apnea  DJD  Glaucoma  Carotid arthrosclerosis  Abnormal troponin, due to PE  5/03/2024 Procedure: pulmonary thrombectomy     General:   How have you been since your discharge from the hospital? Pt feeling better, since hospital discharge--tolerating Xarelto, as prescribed--this is day 3 of medication. Pt denies fever, chills, headache, vision changes, dizziness, nausea, vomiting, diarrhea, bleeding, irregular heartbeat or fast pulse, loss of vision, speech or strength or coordination in any body part, calf pain or swelling, chest pain or shortness of breath at this time. Appetite good, staying hydrated, independent with ADLs.  Do you have any pain since discharge?  No    How well was your pain managed while in the hospital?   On a scale of 1-5   1- Very Poor and 5- Very well   Very Well  When you were leaving the hospital were your discharge instructions reviewed with you? Yes  How well were your discharge instructions explained to you?   On a scale of 1-5   1- Very Poor and 5- Very well   Very Well  Do you have any questions about your discharge instructions?  No  Before leaving the hospital was your diagnoses explained to you? Yes  Do you have any questions about your diagnoses? No  Are you able to perform normal daily activities of living as you have prior to your hospital stay (dressing, bathing, ambulating to the bathroom, etc)? yes  (NCM) Was patient given a different diet per AVS? no    Medications:   Current Outpatient Medications   Medication Sig Dispense Refill    rivaroxaban 15 & 20 MG Oral Tablet Therapy Pack Take As Directed based on package instructions: Days 1-21: 15 mg by mouth twice daily Days 22-30: 20 mg by mouth once daily 51 each 0    brimonidine  0.2 % Ophthalmic Solution Place 1 drop into both eyes in the morning and 1 drop before bedtime.      latanoprost 0.005 % Ophthalmic Solution INSTILL 1 DROP IN BOTH EYE(S) DAILY AT BEDTIME      pantoprazole 20 MG Oral Tab EC Take 1 tablet (20 mg total) by mouth every morning before breakfast. 90 tablet 0    Blood Glucose Monitoring Suppl (ONETOUCH ULTRA 2) w/Device Does not apply Kit 1 strip by In Vitro route daily. 200 kit 0    Tirzepatide (MOUNJARO) 5 MG/0.5ML Subcutaneous Solution Pen-injector Inject 5 mg into the skin once a week. 2 mL 3    Glucose Blood (ONETOUCH VERIO) In Vitro Strip Test 2x daily 200 strip 0    OneTouch Delica Lancets 33G Does not apply Misc Test 2 x daily 200 each 0    Multiple Vitamins-Minerals (MULTI FOR HIM 50+) Oral Tab Take by mouth.      Cholecalciferol (VITAMIN D) 50 MCG (2000 UT) Oral Cap Take by mouth.       Were there any changes to your current medication(s) noted on the AVS? Yes  START taking:  rivaroxaban  STOP taking:  ibuprofen 100 MG Tabs  simvastatin 20 MG Tabs (Zocor)  traMADol 50 MG Tabs (Ultram)  If so, were these medication changes discussed with you prior to leaving the hospital? Yes  If a new medication was prescribed:    Was the new medication's purpose & side effects reviewed? Yes  Do you have any questions about your new medication? No  Did you  your discharge medications when you left the hospital? Yes  Let's go over your medications together to make sure we are not missing anything. Medications Reviewed  Are there any reasons that keep you from taking your medication as prescribed? No  Are you having any concerns with constipation? No  Did patient receive their flu shot (Sept-March)? Yes--11/14/2023    Discharge medications reviewed/discussed/and reconciled against outpatient medications with patient.  Any changes or updates to medications sent to PCP.  Patient Acknowledged     Referrals/orders at D/C:  Referrals/orders placed at D/C? no  DME ordered at D/C?  No    Discharge orders, AVS reviewed and discussed with patient. Any changes or updates to orders sent to PCP.  Patient Acknowledged      SDOH:   Transportation:    Transportation Needs: No Transportation Needs (5/8/2024)    Transportation Needs     Lack of Transportation: No     Car Seat: Not on file     Financial Strain:   Financial Resource Strain: Low Risk  (5/8/2024)    Financial Resource Strain     Difficulty of Paying Living Expenses: Not very hard     Med Affordability: No       Follow up appointments:    Follow-up Information    Follow up With Specialties Details Why Contact Info   Valeriano Ellison MD PULMONARY DISEASES, Internal Medicine, Sleep Disorders Schedule an appointment as soon as possible for a visit in 3 day(s) F/u 3-4 weeks [address]  [facility] [id number] [phone]     Your appointments       Date & Time Appointment Department (Center)    May 13, 2024 2:00 PM CDT Follow Up Visit with Berna Pierre APRN [facility] ([facility])        Jun 04, 2024 2:40 PM CDT Exam - Established with Dawson Means DO [facility] ([facility])        Nov 21, 2024 10:40 AM CST Medicare Annual Well Visit with Dawson Means DO [facility] ([facility])              [facility]  [address] [id number] [phone] [facility]  [address] [address]  [phone]            TCC  Was TCC ordered: No    PCP (If no TCC appointment)  Does patient already have a PCP appointment scheduled? No  NCM Scheduled PCP office TCM appointment with patient for 5/14/2024 with CHEL Wilson    Does the patient have any other follow up  appointment(s) needing to be scheduled? Yes  If yes: NCM reviewed upcoming specialist appointment with patient: Yes  Does the patient need assistance scheduling appointment(s): No--pt will return call to pulmo office tomorrow     Is there any reason as to why you cannot make your appointment(s)?  No     Needs post D/C:   Now that you are home, are there any needs or concerns you need addressed before your next visit with your PCP?  (DME, meds, questions, etc.): No    Interventions by NCM:   Discussed diet, activity, medications and need for f/u visits. Pt will return call to pulmo office tomorrow for f/u with Dr. Ellison in 3-4 weeks. TCM appt made for 5/14/2024 with CHEL Rodríguez. Pt prefers to also keep 6/04/2024 3 month f/u appt with PCP.  Patient aware when to contact PCP/specialists and when to seek emergency care. No further questions/concerns at this time.    Overall Rating:   How would you rate the care you received while in the hospital? excellent    CCM referral placed:    Not Applicable    BOOK BY DATE: 5/10/

## 2024-05-08 NOTE — TELEPHONE ENCOUNTER
Patient was informed to schedule 3 week emergency department follow up, informed no openings. Please contact patient at [phone],thanks.

## 2024-05-08 NOTE — PROGRESS NOTES
Left message on mailbox for pt to call [facility] back for TCM.  [facility] contact information [phone] included in message.

## 2024-05-09 NOTE — TELEPHONE ENCOUNTER
Spoke with patient, hospital follow up appointment scheduled with Dr. Ellison on 6/13/24 at 1pm, verified date and time, patient verbalized understanding.

## 2024-05-13 ENCOUNTER — OFFICE VISIT (OUTPATIENT)
Dept: ENDOCRINOLOGY CLINIC | Facility: CLINIC | Age: 59
End: 2024-05-13

## 2024-05-13 VITALS
BODY MASS INDEX: 29.15 KG/M2 | DIASTOLIC BLOOD PRESSURE: 81 MMHG | RESPIRATION RATE: 16 BRPM | WEIGHT: 208.25 LBS | SYSTOLIC BLOOD PRESSURE: 132 MMHG | HEIGHT: 71 IN | HEART RATE: 91 BPM

## 2024-05-13 DIAGNOSIS — E11.9 CONTROLLED TYPE 2 DIABETES MELLITUS WITHOUT COMPLICATION, WITHOUT LONG-TERM CURRENT USE OF INSULIN (HCC): Primary | ICD-10-CM

## 2024-05-13 LAB
GLUCOSE BLOOD: 129
TEST STRIP LOT #: NORMAL NUMERIC

## 2024-05-13 PROCEDURE — 3075F SYST BP GE 130 - 139MM HG: CPT

## 2024-05-13 PROCEDURE — 82947 ASSAY GLUCOSE BLOOD QUANT: CPT

## 2024-05-13 PROCEDURE — 3008F BODY MASS INDEX DOCD: CPT

## 2024-05-13 PROCEDURE — 99213 OFFICE O/P EST LOW 20 MIN: CPT

## 2024-05-13 PROCEDURE — 3079F DIAST BP 80-89 MM HG: CPT

## 2024-05-13 NOTE — PROGRESS NOTES
Name: Niko Mast  Date: 5/13/24    Referring Physician: No ref. provider found    HISTORY OF PRESENT ILLNESS   Niko Mast is a 58 year old male who presents for follow up for diabetes mellitus     Diabetes History:  Diagnosed- new diagnosis - found during preoperative work up for hip surgery a few months ago    DM FH  - mother   - cousins and uncles on mom's side     Prior glycohemoglobin were: 7.5% 1/2024;  5.6% 5/2024  Glucose in clinic today - 129 mg/dl     Dietary compliance: Good- he has made dietary changes since diagnosis and is following low CHO diet- avoiding breads.      Recall:     Breakfast- egg whites and oatmeal   Lunch- salads   Dinner- fish or chicken with vegetable, fruits   Snack- grapes   Beverages- water was drinking coke zero before.     Exercise: Yes- physical therapy following hip replacement.     Polyuria/polydipsia: No, still some dry mouth   Blurred vision: No    Episodes of hypoglycemia: No  Blood Glucose:    Fasting-      Current DM Regimen:  Mounjaro 5 mg subcutaneous weekly - tolerating medication     Modifying factors:  Medication adherence: Yes   Recent steroids, illness or infections: No     REVIEW OF SYSTEMS  Eyes: Diabetic retinopathy present: No            Most recent visit to eye doctor in last 12 months: Yes- within last 2 months     CV: Cardiovascular disease present: No         Hypertension present: No         Hyperlipidemia present: Yes         Peripheral Vascular Disease present: No    : Nephropathy present: None known     Neuro: Neuropathy present: No, denies symptoms     Skin: Infection or ulceration: No    Osteoporosis: No    Thyroid disease: No      Medications:     Current Outpatient Medications:     rivaroxaban 15 & 20 MG Oral Tablet Therapy Pack, Take As Directed based on package instructions: Days 1-21: 15 mg by mouth twice daily Days 22-30: 20 mg by mouth once daily, Disp: 51 each, Rfl: 0    brimonidine 0.2 % Ophthalmic Solution, Place 1 drop  into both eyes in the morning and 1 drop before bedtime., Disp: , Rfl:     latanoprost 0.005 % Ophthalmic Solution, INSTILL 1 DROP IN BOTH EYE(S) DAILY AT BEDTIME, Disp: , Rfl:     pantoprazole 20 MG Oral Tab EC, Take 1 tablet (20 mg total) by mouth every morning before breakfast., Disp: 90 tablet, Rfl: 0    Tirzepatide (MOUNJARO) 5 MG/0.5ML Subcutaneous Solution Pen-injector, Inject 5 mg into the skin once a week., Disp: 2 mL, Rfl: 3    Multiple Vitamins-Minerals (MULTI FOR HIM 50+) Oral Tab, Take by mouth., Disp: , Rfl:     Cholecalciferol (VITAMIN D) 50 MCG (2000 UT) Oral Cap, Take by mouth., Disp: , Rfl:     Blood Glucose Monitoring Suppl (ONETOUCH ULTRA 2) w/Device Does not apply Kit, 1 strip by In Vitro route daily. (Patient not taking: Reported on 5/13/2024), Disp: 200 kit, Rfl: 0    Glucose Blood (ONETOUCH VERIO) In Vitro Strip, Test 2x daily (Patient not taking: Reported on 5/13/2024), Disp: 200 strip, Rfl: 0    OneTouch Delica Lancets 33G Does not apply Misc, Test 2 x daily (Patient not taking: Reported on 5/13/2024), Disp: 200 each, Rfl: 0     Allergies:   No Known Allergies    Social History:   Social History     Socioeconomic History    Marital status:    Tobacco Use    Smoking status: Never     Passive exposure: Never    Smokeless tobacco: Never   Vaping Use    Vaping status: Never Used   Substance and Sexual Activity    Alcohol use: Yes     Alcohol/week: 2.0 standard drinks of alcohol     Types: 2 Glasses of wine per week     Comment: on ocassion     Drug use: No   Other Topics Concern    Caffeine Concern No     Comment: tea    Exercise Yes       Medical History:   Past Medical History:    Anxiety    Carotid stenosis    Pulmonary embolism (HCC)    Sleep apnea    CPAP    Sleep apnea, obstructive    mild       Surgical history:   Past Surgical History:   Procedure Laterality Date    Colonoscopy      Colonoscopy      Colonoscopy N/A 11/20/2019    Procedure: COLONOSCOPY;  Surgeon: Johann Coelho  MD;  Location: [facility] ENDOSCOPY    Shoulder surg proc unlisted Left          PHYSICAL EXAM  Vitals:    05/13/24 1353   BP: 132/81   Pulse: 91   Resp: 16   Weight: 208 lb 4 oz (94.5 kg)   Height: 5' 11\" (1.803 m)       General Appearance:  alert, well developed, in no acute distress  Eyes:  normal conjunctivae, sclera, and normal pupils  Neck: Trachea midline: Normal  Back: no kyphosis or back tenderness  Respiratory:  clear to auscultation bilaterally  Lymph Nodes:  No abnormal nodes noted  Musculoskeletal:  normal muscle strength and tone  Skin:  normal moisture and skin texture  Hair & Nails:  normal scalp hair     Hematologic:  no excessive bruising  Neuro:  sensory grossly intact and motor grossly intact.  Psychiatric:  oriented to time, self, and place  Nutritional:  no abnormal weight gain or loss      ASSESSMENT/PLAN:    Diabetes mellitus type 2 uncontrolled   -HgA1c- 5.6% 5/2024- significantly improved  -Congratulated patient on improved glycemic control  -Reviewed  diagnosis of diabetes   -Reviewed ABC's of diabetes   - Reviewed pathogenesis of diabetes.   - Reviewed importance of good glycemic control to prevent microvascular and macrovascular complications including nephropathy, neuropathy, retinopathy, and cardiovascular disease.  - Reviewed importance of SBGM- check glucose 1 time daily   - Reviewed target glucose goals for patient  fasting and <180 (ideally <160) post prandially   - Reviewed importance of following diabetic diet- recommended 135 grams of CHO per day or 45 grams per meal.   - Provided patient education materials  .Reviewed importance of SBGM- continue to check glucose twice daily        -Continue Mounjaro 5 mg subcutaneous weekly . Reviewed side effects and risks vs benefits of medication. Reviewed pen injection and dose titration in clinic today.     - Reviewed importance of yearly optho follow up. Now UTD with optho  - Reviewed daily foot care and foot exam  - Lipids 5/2024- LDL-  99    - No known nephropathy- testing ordered   - Has been following with podiatry- last visit and foot exam 4/26/24    RTC in 3 months   5/13/24  Berna Pierre, APRN

## 2024-05-14 ENCOUNTER — OFFICE VISIT (OUTPATIENT)
Dept: FAMILY MEDICINE CLINIC | Facility: CLINIC | Age: 59
End: 2024-05-14

## 2024-05-14 VITALS
HEIGHT: 71 IN | SYSTOLIC BLOOD PRESSURE: 126 MMHG | BODY MASS INDEX: 29.12 KG/M2 | DIASTOLIC BLOOD PRESSURE: 80 MMHG | WEIGHT: 208 LBS | OXYGEN SATURATION: 97 % | HEART RATE: 85 BPM | TEMPERATURE: 97 F

## 2024-05-14 DIAGNOSIS — Z79.01 ANTICOAGULATION THERAPY CONTINUED UPON DISCHARGE: ICD-10-CM

## 2024-05-14 DIAGNOSIS — Z09 HOSPITAL DISCHARGE FOLLOW-UP: Primary | ICD-10-CM

## 2024-05-14 DIAGNOSIS — K21.9 GASTROESOPHAGEAL REFLUX DISEASE, UNSPECIFIED WHETHER ESOPHAGITIS PRESENT: ICD-10-CM

## 2024-05-14 DIAGNOSIS — I26.92 ACUTE SADDLE PULMONARY EMBOLISM, UNSPECIFIED WHETHER ACUTE COR PULMONALE PRESENT (HCC): ICD-10-CM

## 2024-05-14 DIAGNOSIS — I82.4Y2 ACUTE DEEP VEIN THROMBOSIS (DVT) OF PROXIMAL VEIN OF LEFT LOWER EXTREMITY (HCC): ICD-10-CM

## 2024-05-14 PROBLEM — Z86.711 HISTORY OF PULMONARY EMBOLISM: Status: RESOLVED | Noted: 2018-08-13 | Resolved: 2024-05-14

## 2024-05-14 PROBLEM — Z00.00 PHYSICAL EXAM: Status: RESOLVED | Noted: 2017-07-20 | Resolved: 2024-05-14

## 2024-05-14 PROCEDURE — 99495 TRANSJ CARE MGMT MOD F2F 14D: CPT

## 2024-05-14 PROCEDURE — 3074F SYST BP LT 130 MM HG: CPT

## 2024-05-14 PROCEDURE — 3044F HG A1C LEVEL LT 7.0%: CPT

## 2024-05-14 PROCEDURE — 3079F DIAST BP 80-89 MM HG: CPT

## 2024-05-14 PROCEDURE — 3008F BODY MASS INDEX DOCD: CPT

## 2024-05-14 RX ORDER — PANTOPRAZOLE SODIUM 20 MG/1
20 TABLET, DELAYED RELEASE ORAL
Qty: 90 TABLET | Refills: 0 | Status: SHIPPED | OUTPATIENT
Start: 2024-05-14

## 2024-05-14 RX ORDER — PREDNISOLONE ACETATE 10 MG/ML
SUSPENSION/ DROPS OPHTHALMIC
COMMUNITY
Start: 2024-05-09

## 2024-05-14 NOTE — PROGRESS NOTES
Subjective:   Niko Mast is a 58 year old male who presents for hospital follow up.   He was discharged from [facility] to Home or Self Care  Admission Date: 5/3/24   Discharge Date: 5/6/24  Hospital Discharge Diagnosis: Pulmonary embolism, DVT left lower extremity.    Interactive contact within 2 business days post discharge first initiated on Date: 5/7/2024    During the visit, the following was completed:  Obtained and reviewed discharge summary, continuity of care documents, and Hospitalist notes  Reviewed Labs (CBC, CMP), CT radiology results, X-Ray radiology results, EKG, and Echocardiogram    HPI: Patient following up hospitalization 5/ 3-5/ 6.  Patient presented to the ER for left calf discomfort x 2 weeks.  Associated shortness of breath.  CT of the chest demonstrated pulmonary embolism.  Chest x-ray-unremarkable.  Patient started on IV heparin.  Had thrombectomy.  Discharged home on Xarelto.  Lifelong anticoagulation was recommended.  Advised to follow-up with pulmonology 3 to 4 weeks post discharge.  Appointment scheduled 6/13/2024 with pulmonology.  Appointment scheduled 6/4/2024 with PCP Dr. Means.  Patient with history of right total hip arthroplasty in February 2024.  Has been in physical therapy.    Today, denies shortness of breath. Able to walk up a flight of stairs.     History/Other:   Current Medications:  Medication Reconciliation:  I am aware of an inpatient discharge within the last 30 days.  The discharge medication list has been reconciled with the patient's current medication list and reviewed by me.  See medication list for additions of new medication, and changes to current doses of medications and discontinued medications.  Outpatient Medications Marked as Taking for the 5/14/24 encounter (Office Visit) with Mandy Rodríguez APRN   Medication Sig    prednisoLONE 1 % Ophthalmic Suspension AFTER EACH ALSER PROCEDURE, INSTILL 1 DROP IN SURGICAL EYE, FOUR TIMES A DAY FOR 5 DAYS     pantoprazole 20 MG Oral Tab EC Take 1 tablet (20 mg total) by mouth every morning before breakfast.    brimonidine 0.2 % Ophthalmic Solution Place 1 drop into both eyes in the morning and 1 drop before bedtime.    latanoprost 0.005 % Ophthalmic Solution INSTILL 1 DROP IN BOTH EYE(S) DAILY AT BEDTIME    Blood Glucose Monitoring Suppl (ONETOUCH ULTRA 2) w/Device Does not apply Kit 1 strip by In Vitro route daily.    Tirzepatide (MOUNJARO) 5 MG/0.5ML Subcutaneous Solution Pen-injector Inject 5 mg into the skin once a week.    Glucose Blood (ONETOUCH VERIO) In Vitro Strip Test 2x daily    OneTouch Delica Lancets 33G Does not apply Misc Test 2 x daily    Multiple Vitamins-Minerals (MULTI FOR HIM 50+) Oral Tab Take by mouth.    Cholecalciferol (VITAMIN D) 50 MCG (2000 UT) Oral Cap Take by mouth.       Review of Systems   Constitutional: Negative.  Negative for fever.   Respiratory: Negative.  Negative for cough and shortness of breath.    Cardiovascular: Negative.  Negative for chest pain and palpitations.   Neurological: Negative.  Negative for dizziness.   Psychiatric/Behavioral: Negative.     All other systems reviewed and are negative.        Objective:   Physical Exam  Vitals and nursing note reviewed.   Constitutional:       Appearance: Normal appearance.   HENT:      Head: Normocephalic and atraumatic.   Cardiovascular:      Rate and Rhythm: Normal rate and regular rhythm.      Pulses: Normal pulses.      Heart sounds: Normal heart sounds.   Pulmonary:      Effort: Pulmonary effort is normal. No respiratory distress.      Breath sounds: Normal breath sounds. No wheezing.   Neurological:      General: No focal deficit present.      Mental Status: He is alert and oriented to person, place, and time.   Psychiatric:         Mood and Affect: Mood normal.         Behavior: Behavior normal.       /80 (BP Location: Right arm, Patient Position: Sitting, Cuff Size: adult)   Pulse 85   Temp 97.2 °F (36.2 °C)  (Temporal)   Ht 5' 11\" (1.803 m)   Wt 208 lb (94.3 kg)   SpO2 97%   BMI 29.01 kg/m²  Estimated body mass index is 29.01 kg/m² as calculated from the following:    Height as of this encounter: 5' 11\" (1.803 m).    Weight as of this encounter: 208 lb (94.3 kg).    Assessment & Plan:   1. Hospital discharge follow-up (Primary)  2. Acute saddle pulmonary embolism, unspecified whether acute cor pulmonale present (HCC)  3. Acute deep vein thrombosis (DVT) of proximal vein of left lower extremity (HCC)  4. Anticoagulation therapy continued upon discharge  Patient recently hospitalized 5 3-5/6.  Reviewed note, imaging.  Patient is scheduled follow-up with pulmonology in June.  Schedule follow-up with PCP in June.  Taking blood thinners as prescribed.  Discussed likely will need to be lifelong due to history of pulmonary embolism and size of the clot.  Patient denies shortness of breath, chest pain, dizziness, change in vision today.  Mild headache.  Advised to continue to follow with specialist as scheduled.    5. Gastroesophageal reflux disease, unspecified whether esophagitis present  Other orders  -     Pantoprazole Sodium; Take 1 tablet (20 mg total) by mouth every morning before breakfast.  Dispense: 90 tablet; Refill: 0  Symptoms improve on medication.  Continue current management.  Advised to avoid spicy, citrus, greasy, fried, alcohol, caffeine.    Follow-up as scheduled or sooner if needed

## 2024-05-23 ENCOUNTER — HOSPITAL ENCOUNTER (EMERGENCY)
Facility: HOSPITAL | Age: 59
Discharge: HOME | End: 2024-05-23
Attending: EMERGENCY MEDICINE

## 2024-05-23 ENCOUNTER — APPOINTMENT (OUTPATIENT)
Dept: CT IMAGING | Facility: HOSPITAL | Age: 59
End: 2024-05-23
Attending: EMERGENCY MEDICINE

## 2024-05-23 ENCOUNTER — APPOINTMENT (OUTPATIENT)
Dept: GENERAL RADIOLOGY | Facility: HOSPITAL | Age: 59
End: 2024-05-23
Attending: EMERGENCY MEDICINE

## 2024-05-23 VITALS
OXYGEN SATURATION: 99 % | HEART RATE: 57 BPM | DIASTOLIC BLOOD PRESSURE: 67 MMHG | HEIGHT: 71 IN | WEIGHT: 208 LBS | SYSTOLIC BLOOD PRESSURE: 115 MMHG | BODY MASS INDEX: 29.12 KG/M2 | RESPIRATION RATE: 15 BRPM | TEMPERATURE: 98 F

## 2024-05-23 DIAGNOSIS — G44.209 TENSION HEADACHE: ICD-10-CM

## 2024-05-23 DIAGNOSIS — R07.9 CHEST PAIN OF UNCERTAIN ETIOLOGY: Primary | ICD-10-CM

## 2024-05-23 LAB
ANION GAP SERPL CALC-SCNC: 7 MMOL/L (ref 0–18)
ATRIAL RATE: 67 BPM
BASOPHILS # BLD AUTO: 0.02 X10(3) UL (ref 0–0.2)
BASOPHILS NFR BLD AUTO: 0.5 %
BNP SERPL-MCNC: 14 PG/ML
BUN BLD-MCNC: 17 MG/DL (ref 9–23)
BUN/CREAT SERPL: 14.4 (ref 10–20)
CALCIUM BLD-MCNC: 9.6 MG/DL (ref 8.7–10.4)
CHLORIDE SERPL-SCNC: 105 MMOL/L (ref 98–112)
CO2 SERPL-SCNC: 27 MMOL/L (ref 21–32)
CREAT BLD-MCNC: 1.18 MG/DL
DEPRECATED RDW RBC AUTO: 46.2 FL (ref 35.1–46.3)
EGFRCR SERPLBLD CKD-EPI 2021: 72 ML/MIN/1.73M2 (ref 60–?)
EOSINOPHIL # BLD AUTO: 0.07 X10(3) UL (ref 0–0.7)
EOSINOPHIL NFR BLD AUTO: 1.6 %
ERYTHROCYTE [DISTWIDTH] IN BLOOD BY AUTOMATED COUNT: 13.6 % (ref 11–15)
GLUCOSE BLD-MCNC: 129 MG/DL (ref 70–99)
HCT VFR BLD AUTO: 40.4 %
HGB BLD-MCNC: 13.3 G/DL
IMM GRANULOCYTES # BLD AUTO: 0.01 X10(3) UL (ref 0–1)
IMM GRANULOCYTES NFR BLD: 0.2 %
LYMPHOCYTES # BLD AUTO: 1.74 X10(3) UL (ref 1–4)
LYMPHOCYTES NFR BLD AUTO: 40.9 %
MCH RBC QN AUTO: 30.4 PG (ref 26–34)
MCHC RBC AUTO-ENTMCNC: 32.9 G/DL (ref 31–37)
MCV RBC AUTO: 92.4 FL
MONOCYTES # BLD AUTO: 0.49 X10(3) UL (ref 0.1–1)
MONOCYTES NFR BLD AUTO: 11.5 %
NEUTROPHILS # BLD AUTO: 1.92 X10 (3) UL (ref 1.5–7.7)
NEUTROPHILS # BLD AUTO: 1.92 X10(3) UL (ref 1.5–7.7)
NEUTROPHILS NFR BLD AUTO: 45.3 %
OSMOLALITY SERPL CALC.SUM OF ELEC: 291 MOSM/KG (ref 275–295)
P AXIS: 32 DEGREES
P-R INTERVAL: 162 MS
PLATELET # BLD AUTO: 203 10(3)UL (ref 150–450)
POTASSIUM SERPL-SCNC: 4.1 MMOL/L (ref 3.5–5.1)
Q-T INTERVAL: 368 MS
QRS DURATION: 82 MS
QTC CALCULATION (BEZET): 388 MS
R AXIS: -1 DEGREES
RBC # BLD AUTO: 4.37 X10(6)UL
SODIUM SERPL-SCNC: 139 MMOL/L (ref 136–145)
T AXIS: -4 DEGREES
TROPONIN I SERPL HS-MCNC: 6 NG/L
VENTRICULAR RATE: 67 BPM
WBC # BLD AUTO: 4.3 X10(3) UL (ref 4–11)

## 2024-05-23 PROCEDURE — 93005 ELECTROCARDIOGRAM TRACING: CPT

## 2024-05-23 PROCEDURE — 80048 BASIC METABOLIC PNL TOTAL CA: CPT | Performed by: EMERGENCY MEDICINE

## 2024-05-23 PROCEDURE — 93010 ELECTROCARDIOGRAM REPORT: CPT

## 2024-05-23 PROCEDURE — 36415 COLL VENOUS BLD VENIPUNCTURE: CPT

## 2024-05-23 PROCEDURE — 70450 CT HEAD/BRAIN W/O DYE: CPT | Performed by: EMERGENCY MEDICINE

## 2024-05-23 PROCEDURE — 84484 ASSAY OF TROPONIN QUANT: CPT | Performed by: EMERGENCY MEDICINE

## 2024-05-23 PROCEDURE — 71045 X-RAY EXAM CHEST 1 VIEW: CPT | Performed by: EMERGENCY MEDICINE

## 2024-05-23 PROCEDURE — 99285 EMERGENCY DEPT VISIT HI MDM: CPT

## 2024-05-23 PROCEDURE — 85025 COMPLETE CBC W/AUTO DIFF WBC: CPT | Performed by: EMERGENCY MEDICINE

## 2024-05-23 PROCEDURE — 83880 ASSAY OF NATRIURETIC PEPTIDE: CPT | Performed by: EMERGENCY MEDICINE

## 2024-05-23 NOTE — ED PROVIDER NOTES
Patient Seen in: [facility] Emergency Department    History     Chief Complaint   Patient presents with    Chest Pain Angina    Dizziness     Stated Complaint: lightheaded,numbness,abd pain    HPI    Patient complains of headache lightheaded with some numbness in his face and chest tightness.  States that he is felt little lightheaded mild headache for the past couple of days since last night having some tightness in the chest.  He denies shortness of breath denies any pleuritic pain.  Recently diagnosed with PE.  He is on Xarelto.  No visual changes no numbness tingling or weakness..    Alleviating factors: none  Exacerbating factors: none    Past Medical History:    Anxiety    Carotid stenosis    Pulmonary embolism (HCC)    Sleep apnea    CPAP    Sleep apnea, obstructive    mild       Past Surgical History:   Procedure Laterality Date    Colonoscopy      Colonoscopy      Colonoscopy N/A 11/20/2019    Procedure: COLONOSCOPY;  Surgeon: Johann Coelho MD;  Location: [facility] ENDOSCOPY    Shoulder surg proc unlisted Left             Family History   Problem Relation Age of Onset    Cancer Father 72        colon cancer       Social History     Socioeconomic History    Marital status:    Tobacco Use    Smoking status: Never     Passive exposure: Never    Smokeless tobacco: Never   Vaping Use    Vaping status: Never Used   Substance and Sexual Activity    Alcohol use: Yes     Alcohol/week: 2.0 standard drinks of alcohol     Types: 2 Glasses of wine per week     Comment: on ocassion     Drug use: No   Other Topics Concern    Caffeine Concern No     Comment: tea    Exercise Yes     Social Determinants of Health     Financial Resource Strain: Low Risk  (5/8/2024)    Financial Resource Strain     Difficulty of Paying Living Expenses: Not very hard     Med Affordability: No   Food Insecurity: No Food Insecurity (5/3/2024)    Food Insecurity     Food Insecurity: Never true   Transportation Needs: No  Transportation Needs (5/8/2024)    Transportation Needs     Lack of Transportation: No   Housing Stability: Low Risk  (5/3/2024)    Housing Stability     Housing Instability: No       Review of Systems    Positive for stated complaint: lightheaded,numbness,abd pain  Other systems are as noted in HPI.  Constitutional and vital signs reviewed.      All other systems reviewed and negative except as noted above.    PSFH elements reviewed from today and agreed except as otherwise stated in HPI.    Physical Exam     ED Triage Vitals [05/23/24 0859]   /76   Pulse 69   Resp 20   Temp 98.1 °F (36.7 °C)   Temp src    SpO2 99 %   O2 Device None (Room air)       Current:/67   Pulse 57   Temp 98.1 °F (36.7 °C)   Resp 15   Ht 180.3 cm (5' 11\")   Wt 94.3 kg   SpO2 99%   BMI 29.01 kg/m²    PULSE OX nl  GENERAL: awake alert  HEAD: normocephalic, atraumatic,   EYES: PERRLA, EOMI, conj sclera clear  THROAT: mmm, no lesions  NECK: supple, no meningeal signs  LUNGS: no resp distress, cta bilateral  CARDIO: RRR without murmur  GI: abdomen is soft and non tender, no masses, nl bowel sounds   EXTREMITIES: from, 5/5 strength in all 4 ext, no edema  NEURO: alert and oiented *3, 2-12 intact, no focal deficit noted  SKIN: good skin turgor, no  rashes  PSYCH: calm, cooperative,    Differential includes:headache on xarelto need Ct to RO intracranial bleed.  Chest pain sounds failry benign but will get EKG and trop to ro ACS    ED Course     Labs Reviewed   BASIC METABOLIC PANEL (8) - Abnormal; Notable for the following components:       Result Value    Glucose 129 (*)     All other components within normal limits   TROPONIN I HIGH SENSITIVITY - Normal   BNP (B TYPE NATRIURETIC PEPTIDE) - Normal   CBC WITH DIFFERENTIAL WITH PLATELET    Narrative:     The following orders were created for panel order CBC With Differential With Platelet.  Procedure                               Abnormality         Status                      ---------                               -----------         ------                     CBC W/ DIFFERENTIAL[[id number]]                              Final result                 Please view results for these tests on the individual orders.   RAINBOW DRAW BLUE   CBC W/ DIFFERENTIAL     EKG    Rate, intervals and axes as noted on EKG Report.  Rate: 67  Rhythm: Sinus Rhythm  Reading: nsr with non specific st changes           MDM       Cardiac Monitor:   Pulse Readings from Last 1 Encounters:   05/23/24 57   , sinus, 58  interpreted by me.    Radiology findings:   XR CHEST AP PORTABLE  (CPT=71045)    Result Date: 5/23/2024  CONCLUSION:  1. Borderline cardiomegaly.  2. Hypoinflation.  No acute pulmonary disease.    Dictated by (CST): Yonas Villanueva MD on 5/23/2024 at 10:12 AM     Finalized by (CST): Yonas Villanueva MD on 5/23/2024 at 10:14 AM          CT BRAIN OR HEAD (11924)    Result Date: 5/23/2024  CONCLUSION:  1. No acute intracranial process by noncontrast CT technique. 2. Mild scattered intracranial atherosclerotic disease.   elm-remote  Dictated by (CST): Ash Farrell MD on 5/23/2024 at 10:03 AM     Finalized by (CST): Ash Farrell MD on 5/23/2024 at 10:06 AM           I reviewed CT and noted no intracranial bleeding    I reviewed xray noted no infiltrates no pneumothorax      Medical Decision Making  Problems Addressed:  Chest pain of uncertain etiology: acute illness or injury  Tension headache: acute illness or injury    Amount and/or Complexity of Data Reviewed  Labs: ordered. Decision-making details documented in ED Course.  Radiology: ordered and independent interpretation performed. Decision-making details documented in ED Course.  ECG/medicine tests: ordered and independent interpretation performed. Decision-making details documented in ED Course.        Disposition and Plan     Clinical Impression:  1. Chest pain of uncertain etiology    2. Tension headache         Disposition:  Discharge    Follow-up:  Dawson Means, DO  [address] [address]  [phone]    Follow up        Medications Prescribed:  Discharge Medication List as of 5/23/2024 10:43 AM

## 2024-05-23 NOTE — ED INITIAL ASSESSMENT (HPI)
C/o dizziness, chest tightness, generalized numbness. Pt reports he is nervous due to DVT in his LLE & saddle PE on May 3rd.

## 2024-05-29 NOTE — TELEPHONE ENCOUNTER
Patient requesting a refill of medication pended below.   States medication was prescribed in the ED on 5/23.   Patient states he has follow up appt with cardio in June.   Patient was vague with information regarding medication only stating he will be out of the medication by next week.   Message sent to provider.

## 2024-05-30 NOTE — TELEPHONE ENCOUNTER
Patient called, verified Name and . Following up on refill of blood thinner. Relayed prescription was sent yesterday. He will follow up with pharmacy. No further questions or concerns at this time.

## 2024-06-04 ENCOUNTER — NURSE ONLY (OUTPATIENT)
Dept: INTERNAL MEDICINE CLINIC | Facility: CLINIC | Age: 59
End: 2024-06-04

## 2024-06-04 ENCOUNTER — OFFICE VISIT (OUTPATIENT)
Dept: FAMILY MEDICINE CLINIC | Facility: CLINIC | Age: 59
End: 2024-06-04
Payer: COMMERCIAL

## 2024-06-04 VITALS
TEMPERATURE: 98 F | HEART RATE: 90 BPM | DIASTOLIC BLOOD PRESSURE: 71 MMHG | BODY MASS INDEX: 29 KG/M2 | WEIGHT: 206 LBS | SYSTOLIC BLOOD PRESSURE: 113 MMHG | RESPIRATION RATE: 18 BRPM | OXYGEN SATURATION: 96 %

## 2024-06-04 DIAGNOSIS — E11.9 NEW ONSET TYPE 2 DIABETES MELLITUS (HCC): ICD-10-CM

## 2024-06-04 DIAGNOSIS — Z12.11 SCREENING FOR COLON CANCER: Primary | ICD-10-CM

## 2024-06-04 DIAGNOSIS — I26.92 ACUTE SADDLE PULMONARY EMBOLISM WITHOUT ACUTE COR PULMONALE (HCC): ICD-10-CM

## 2024-06-04 DIAGNOSIS — E11.9 TYPE 2 DIABETES MELLITUS WITHOUT COMPLICATION, WITHOUT LONG-TERM CURRENT USE OF INSULIN (HCC): ICD-10-CM

## 2024-06-04 LAB
CREAT UR-SCNC: 172.1 MG/DL
MICROALBUMIN UR-MCNC: <0.3 MG/DL

## 2024-06-04 PROCEDURE — 2033F EYE IMG VALID W/O RTNOPTHY: CPT | Performed by: FAMILY MEDICINE

## 2024-06-04 PROCEDURE — 3074F SYST BP LT 130 MM HG: CPT | Performed by: FAMILY MEDICINE

## 2024-06-04 PROCEDURE — 92229 IMG RTA DETC/MNTR DS POC ALY: CPT | Performed by: FAMILY MEDICINE

## 2024-06-04 PROCEDURE — 99214 OFFICE O/P EST MOD 30 MIN: CPT | Performed by: FAMILY MEDICINE

## 2024-06-04 PROCEDURE — 3078F DIAST BP <80 MM HG: CPT | Performed by: FAMILY MEDICINE

## 2024-06-04 PROCEDURE — 3061F NEG MICROALBUMINURIA REV: CPT | Performed by: FAMILY MEDICINE

## 2024-06-04 NOTE — PATIENT INSTRUCTIONS
Referred to GI for colonoscopy.  Retinopathy screen ordered and recommended on today.  Diabetic foot exam done on today.  Microalbumin/protein ratio done on today.  Work physical form completed today.

## 2024-06-05 NOTE — PROGRESS NOTES
Subjective:     Patient ID: Niko Mast is a 58 year old male.    This patient is a 58-year-old well-established -American gentleman with a history of recent DVT and currently on rivaroxaban who is currently asymptomatic without any complaint of leg discomfort or shortness of breath or exertional fatigue.  He is also newly diagnosed diabetic with doing extremely well and under the care of of the endocrinologist.  His latest A1c is below the prediabetic range.    Patient denies headache, chest pain, dizziness, shortness of breath, visual changes, exertional fatigue, urinary frequency, and/or excessive hunger or thirst.    Patient has made an intentional Jabri for weight loss via increased physical exercise as well as persistent healthy dietary choices.    Patient is due for colonoscopy and referral has been placed on the chart.    Patient had diabetic foot exam done on today as well.            History/Other:   Review of Systems  Current Outpatient Medications   Medication Sig Dispense Refill    rivaroxaban 15 & 20 MG Oral Tablet Therapy Pack Take As Directed based on package instructions: Days 1-21: 15 mg by mouth twice daily Days 22-30: 20 mg by mouth once daily 51 each 0    prednisoLONE 1 % Ophthalmic Suspension AFTER EACH ALSER PROCEDURE, INSTILL 1 DROP IN SURGICAL EYE, FOUR TIMES A DAY FOR 5 DAYS      pantoprazole 20 MG Oral Tab EC Take 1 tablet (20 mg total) by mouth every morning before breakfast. 90 tablet 0    brimonidine 0.2 % Ophthalmic Solution Place 1 drop into both eyes in the morning and 1 drop before bedtime.      latanoprost 0.005 % Ophthalmic Solution INSTILL 1 DROP IN BOTH EYE(S) DAILY AT BEDTIME      Blood Glucose Monitoring Suppl (ONETOUCH ULTRA 2) w/Device Does not apply Kit 1 strip by In Vitro route daily. 200 kit 0    Tirzepatide (MOUNJARO) 5 MG/0.5ML Subcutaneous Solution Pen-injector Inject 5 mg into the skin once a week. 2 mL 3    Glucose Blood (ONETOUCH VERIO) In Vitro Strip  Test 2x daily 200 strip 0    OneTouch Delica Lancets 33G Does not apply Misc Test 2 x daily 200 each 0    Multiple Vitamins-Minerals (MULTI FOR HIM 50+) Oral Tab Take by mouth.      Cholecalciferol (VITAMIN D) 50 MCG (2000 UT) Oral Cap Take by mouth.       Allergies:No Known Allergies    Past Medical History:    Anxiety    Carotid stenosis    Pulmonary embolism (HCC)    Sleep apnea    CPAP    Sleep apnea, obstructive    mild      Past Surgical History:   Procedure Laterality Date    Colonoscopy      Colonoscopy      Colonoscopy N/A 11/20/2019    Procedure: COLONOSCOPY;  Surgeon: Johann Coelho MD;  Location: [facility] ENDOSCOPY    Shoulder surg proc unlisted Left       Family History   Problem Relation Age of Onset    Cancer Father 72        colon cancer      Social History:   Social History     Socioeconomic History    Marital status:    Tobacco Use    Smoking status: Never     Passive exposure: Never    Smokeless tobacco: Never   Vaping Use    Vaping status: Never Used   Substance and Sexual Activity    Alcohol use: Yes     Alcohol/week: 2.0 standard drinks of alcohol     Types: 2 Glasses of wine per week     Comment: on ocassion     Drug use: No   Other Topics Concern    Caffeine Concern No     Comment: tea    Exercise Yes     Social Determinants of Health     Financial Resource Strain: Low Risk  (5/8/2024)    Financial Resource Strain     Difficulty of Paying Living Expenses: Not very hard     Med Affordability: No   Food Insecurity: No Food Insecurity (5/3/2024)    Food Insecurity     Food Insecurity: Never true   Transportation Needs: No Transportation Needs (5/8/2024)    Transportation Needs     Lack of Transportation: No   Housing Stability: Low Risk  (5/3/2024)    Housing Stability     Housing Instability: No        Objective:   Vitals:    06/04/24 1455   BP: 113/71   Pulse: 90   Resp: 18   Temp: 98.4 °F (36.9 °C)       Physical Exam  Constitutional:       Appearance: Normal appearance. He is not  ill-appearing.   HENT:      Head: Normocephalic and atraumatic.   Cardiovascular:      Rate and Rhythm: Normal rate and regular rhythm.      Heart sounds:      No gallop.   Pulmonary:      Effort: Pulmonary effort is normal.      Breath sounds: Normal breath sounds.   Feet:      Comments: Bilateral barefoot skin diabetic exam is normal, visualized feet and the appearance is normal.  Bilateral monofilament/sensation of both feet is normal.  Pulsation pedal pulse exam of both lower legs/feet is normal as well.        Neurological:      Mental Status: He is alert and oriented to person, place, and time.         Assessment & Plan:   1. Acute saddle pulmonary embolism without acute cor pulmonale (HCC)  Asymptomatic and out of the acute phase.  Presentation was on May 3, 2024.  Patient had another emergency room evaluation on May 23, 2024 with chest discomfort, but etiology was undetermined.  Clinically the patient is doing great.  Compliance emphasized regarding anticoagulation.    2. Type 2 diabetes mellitus without complication, without long-term current use of insulin (HCC)  The following has been ordered for diabetic status update.  - Microalb/Creat Ratio, Random Urine [E]; Future  - Diabetic Retinopathy Exam  OU - Both Eyes; Future  - Microalb/Creat Ratio, Random Urine [E]    3. Screening for colon cancer  Referred.  - Gastro GI Telephone Colon Screen; Future      Orders Placed This Encounter   Procedures    Microalb/Creat Ratio, Random Urine [E]    Diabetic Retinopathy Exam  OU - Both Eyes       Meds This Visit:  Requested Prescriptions      No prescriptions requested or ordered in this encounter       Imaging & Referrals:  OP REFERRAL TO [facility] GI TELEPHONE COLON SCREEN     Patient Instructions   Referred to GI for colonoscopy.  Retinopathy screen ordered and recommended on today.  Diabetic foot exam done on today.  Microalbumin/protein ratio done on today.  Work physical form completed today.     Return in about 3  months (around 9/4/2024), or if symptoms worsen or fail to improve.

## 2024-06-07 ENCOUNTER — TELEPHONE (OUTPATIENT)
Facility: CLINIC | Age: 59
End: 2024-06-07

## 2024-06-07 DIAGNOSIS — Z86.010 PERSONAL HISTORY OF COLONIC POLYPS: Primary | ICD-10-CM

## 2024-06-07 NOTE — TELEPHONE ENCOUNTER
Dr. Coelho    Patient called to schedule recall colonoscopy.  He is on rivaroxaban for pulmonary embolism about a month ago.  You are booking into January for the hospital location that he would likely need.    Please advise if ok to schedule directly or if needs to be seen first.    Thank you    Last Procedure, Date, MD:  Colonoscopy Dr. Coelho 11/20/2019  Last Diagnosis:  colon polyps x2, internal hemorrhoids  Recalled (mth/yrs): 5 years  Sedation Used Previously:  MAC  Last Prep Used (if known):  Split Colyte  Quality Of Prep (if known): good  Anticoagulants: Rivaroxaban-for pulmonary embolism 5/3/2024  Diabetic Med's (PO/Injectables): mounjaro  Weight loss Med's: no  Iron/Herbal/Multivitamin Supplements (RX/OTC): multivitamin, vitamin D  Marijuana/Vaping/CBD: no  Height & Weight: 5'11\" 206 lbs  BMI: 28.7  Hx of Cardiac/CVA Issues/(MI/Stroke): no  Devices Pacemaker/Defibrillator/Stents: no  Respiratory Issues/Oxygen Use/LEONEL/COPD: yes sleep apnea, uses mouth guard  Issues w/ Anesthesia: no    Symptoms (Y/N): no  Symptoms Details: n/a    Special Comments/Notes:    Please advise on orders and prep.     Thank you!

## 2024-06-07 NOTE — TELEPHONE ENCOUNTER
Johann Coelho MD   Physician  Gastroenterology     Operative Report  Signed     Date of Service: 11/20/2019  2:59 PM  Case Time: Procedures: Surgeons:   11/20/2019  2:38 PM COLONOSCOPY    Johann Coelho MD               Signed         [facility] Endoscopy Report        Preoperative Diagnosis:  - history of colon polyps   - family history of colon cancer        Post operative Diagnosis:  - colon polyps x 2  - internal hemorrhoids        Procedure:    Colonoscopy         Surgeon:  Johann Coelho M.D.     Anesthesia:  MAC sedation, see anesthesia records     Technique:  After informed consent, the patient was placed in the left lateral recumbent position.  Digital rectal examination revealed no palpable intraluminal abnormalities.  An Olympus variable stiffness 190 series HD colonoscope was inserted into the rectum and advanced under direct vision by following the lumen to the cecum.  The colon was examined upon withdrawal in the left lateral position.     The procedure was well tolerated without immediate complication.        Findings:  The preparation of the colon was good.  The terminal ileum was examined for 4 cm and visually normal.  The ileocecal valve was well preserved. The visualized colonic mucosa from the cecum to the anal verge was normal with an intact vascular pattern.     Colon polyps x2 removed as follows;  -Descending x1, sessile approximately 3 to 4 mm in size and removed by cold snare technique.    -Sigmoid x1, sessile 3 mm in size and cold snare removed.  Both polypectomy sites inspected and found to be free of bleeding and specimens retrieved and sent for analysis.     Small internal hemorrhoids noted on retroflexed view.     Recommendations:  - Post polypectomy instructions given  - Repeat colonoscopy in 3- 5 years  - Symptomatic treatment of hemorrhoids              Johann Coelho MD  11/20/2019  2:59 PM              Johann Coelho MD  11/21/2019  2:22 PM CHRISTY SMITH  to place on the colon call back for 5 years and mail letter to the pt. Thanks.             Contains abnormal data Specimen to Pathology, Tissue: [id number]  Order: [id number]   Collected 11/20/2019  2:48 PM       Status: Final result       Visible to patient: Yes (not seen)       Dx: Family history of colon cancer; Histo...    3 Result Notes       1 Follow-up Encounter        Component  Ref Range & Units      Case Report     Surgical Pathology                                Case: [id number]                                     Authorizing Provider:  Johann Coelho MD       Collected:           11/20/2019 02:48 PM             Ordering Location:     [facility]          Received:            11/20/2019 03:38 PM                                    Endoscopy [facility]                                                           Pathologist:           Amina Jorgensen MD                                                           Specimens:   A) - Colon descending, polyp                                                                          B) - Sigmoid colon, polyp                                                                      Final Diagnosis:        A. Descending colon polyp:   Tubular adenoma.       B. Sigmoid colon polyp:   Vegetable material.  No human tissue identified.     Electronically signed by Amina Jorgensen MD on 11/21/2019 at  2:21 PM        Clinical Information      Z86.010 History Of Colon Polyps.  Z80.0 Family History Of Colon Cancer.          Gross Description      Specimen A is labeled \"Mast, descending colon polyp\" received in formalin. The specimen consists of one fragment tan soft tissue measuring 0.3 cm in greatest dimension. Submitted entirely in cassette A1.      Specimen B is labeled \"Berry, sigmoid colon polyp\" received in formalin. The specimen consists of a minute fragment pinkish red tissue measuring <0.1 cm in greatest dimension. The specimen is filtered through a biopsy bag,  inked with hematoxylin. Submitted entirely in cassette B1. The specimen might not survive processing. (jq)      Amina Jorgensen M.D./teofilo      Interpretation     Abnormal Abnormal      Electronically signed by Amina Jorgensen MD on 11/21/2019 at  2:21 PM     Resulting Agency [facility] ([facility])                Specimen Collected: 11/20/19  2:48 PM Last Resulted: 11/21/19  2:21 PM

## 2024-06-11 ENCOUNTER — TELEPHONE (OUTPATIENT)
Dept: FAMILY MEDICINE CLINIC | Facility: CLINIC | Age: 59
End: 2024-06-11

## 2024-06-11 NOTE — TELEPHONE ENCOUNTER
Colonoscopy for history of colon polyps and CRC screening  [facility] GI lab    Hold luan 2 days before and ferdinand 7 days before procedure

## 2024-06-11 NOTE — TELEPHONE ENCOUNTER
Patient dropped off FMLA, filled out LISHA and paid fee. Forms will be send to forms department.

## 2024-06-11 NOTE — TELEPHONE ENCOUNTER
Randi from [facility] called requesting to speak to  nurse or medical assistant in regards to patients care gaps.

## 2024-06-12 NOTE — TELEPHONE ENCOUNTER
Left message for patient to call back to schedule Colonoscopy.     Please transfer call to GI surgery scheduling

## 2024-06-12 NOTE — TELEPHONE ENCOUNTER
Scheduled for:  Colonoscopy [address]  Provider Name:   Laquita  Date:  1/9/2025  Location:   [facility]   Sedation:  MAC  Time:  1230 pm  Prep:  CoLyte  Meds/Allergies Reconciled?:  Physician reviewed   Diagnosis with codes:    Was patient informed to call insurance with codes (Y/N):  Yes, I confirmed BCBS insurance with the patient.   Referral sent?:  N/A  [facility] or [facility] notified?:  I sent an electronic request to Endo Scheduling and received a confirmation today.      Medication Orders:  This patient verbally confirmed that he is not taking:  Iron, BP meds, and is not diabetic  No latex allergy, No PCN allergy and does not have a pacemaker     Hold Mounjaro 7 days prior to procedure  Hold Xarelto per Cardiologist instructions    Misc Orders:       Further instructions given by staff:

## 2024-06-12 NOTE — TELEPHONE ENCOUNTER
Family Medical Leave Act forms Release of Information received w/ indication of wanting to  forms, logged for processing

## 2024-06-13 ENCOUNTER — OFFICE VISIT (OUTPATIENT)
Dept: PULMONOLOGY | Facility: CLINIC | Age: 59
End: 2024-06-13

## 2024-06-13 VITALS
HEART RATE: 88 BPM | SYSTOLIC BLOOD PRESSURE: 108 MMHG | HEIGHT: 71 IN | DIASTOLIC BLOOD PRESSURE: 68 MMHG | WEIGHT: 206 LBS | OXYGEN SATURATION: 96 % | BODY MASS INDEX: 28.84 KG/M2 | RESPIRATION RATE: 12 BRPM

## 2024-06-13 DIAGNOSIS — I26.92 ACUTE SADDLE PULMONARY EMBOLISM, UNSPECIFIED WHETHER ACUTE COR PULMONALE PRESENT (HCC): Primary | ICD-10-CM

## 2024-06-13 PROCEDURE — 3008F BODY MASS INDEX DOCD: CPT | Performed by: INTERNAL MEDICINE

## 2024-06-13 PROCEDURE — 3078F DIAST BP <80 MM HG: CPT | Performed by: INTERNAL MEDICINE

## 2024-06-13 PROCEDURE — 3074F SYST BP LT 130 MM HG: CPT | Performed by: INTERNAL MEDICINE

## 2024-06-13 PROCEDURE — 99213 OFFICE O/P EST LOW 20 MIN: CPT | Performed by: INTERNAL MEDICINE

## 2024-06-13 NOTE — TELEPHONE ENCOUNTER
Patient called and confirmed that the Family Medical Leave Act forms are recertification with the same information for 6/13/24 - 6/13/25.    Patient will be picking up forms at the doctor's office.

## 2024-06-13 NOTE — PROGRESS NOTES
The patient is a 58-year-old male who I know well from prior evaluation comes in now for follow-up.  He was hospitalized with a saddle PE and underwent suction thrombectomy and was kept on heparin and Xarelto and he remains on Xarelto.  This occurred in early May.  He also has a distant history of blood clot.  He had right hip surgery in February of this year and then had a blood clot 3 months later.  Additionally, he had nonocclusive right lower extremity DVT.  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    Review of Systems:  Vision normal. Ear nose and throat normal. Bowel normal. Bladder function normal. No depression. No thyroid disease. No lymphatic system concerns.  No rash. Muscles and joints unremarkable. No weight loss no weight gain.    Physical Examination:  Vital signs normal. HEENT examination is unremarkable with pupils equal round and reactive to light and accommodation. Neck without adenopathy, thyromegaly, JVD nor bruit. Lungs clear to auscultation and percussion. Cardiac regular rate and rhythm no murmur. Abdomen nontender, without hepatosplenomegaly and no mass appreciable. Extremities and Musculoskeletal without clubbing cyanosis nor edema, and mobility acceptable. Neurologic grossly intact with symmetric tone and strength and reflex.    Assessment and plan:  1.  Saddle pulmonary embolism-life-threatening and now is recurrent.    Recommendations: Full dose anticoagulation with Xarelto 20 mg until early November and then can decrease the dose to 10 mg indefinitely.  The patient should see me in follow-up in November.  The patient may want to modify his work as a [employer] as it would not be safe for him to get in any altercation while on blood thinner.    2.  Obstructive sleep apnea-vigilance with CPAP every night all night.

## 2024-06-13 NOTE — TELEPHONE ENCOUNTER
Called patient,left message to call back. Please confirm with patient if Family Medical Leave Act forms are recert, and if all will remain the same.

## 2024-06-13 NOTE — TELEPHONE ENCOUNTER
Randi from [facility] called to verify patient Care Gaps completed. Physical completed on 11/14/2023, colonoscopy scheduled for 1/9/2025, COVID vaccine done 8/2/2022, and microalbumin creatine ratio urine done on 6/4/2024- normal. Did not have any further questions.

## 2024-06-14 NOTE — TELEPHONE ENCOUNTER
Hand signed Family Medical Leave Act forms scanned into patient's chart.     Called patient left message, forms are ready for  at providers .

## 2024-06-28 ENCOUNTER — TELEPHONE (OUTPATIENT)
Dept: ENDOCRINOLOGY CLINIC | Facility: CLINIC | Age: 59
End: 2024-06-28

## 2024-06-28 ENCOUNTER — TELEPHONE (OUTPATIENT)
Dept: FAMILY MEDICINE CLINIC | Facility: CLINIC | Age: 59
End: 2024-06-28

## 2024-06-28 RX ORDER — TIRZEPATIDE 5 MG/.5ML
5 INJECTION, SOLUTION SUBCUTANEOUS WEEKLY
Qty: 6 ML | Refills: 1 | Status: SHIPPED | OUTPATIENT
Start: 2024-06-28

## 2024-06-28 NOTE — TELEPHONE ENCOUNTER
Endocrine Refill protocol for oral and injectable diabetic medications    Protocol Criteria: PASSED    -Appointment with Endocrinology completed in the last 6 months or scheduled in the next 3 months    -A1c result <8.5% in the past 6 months      Verify the above has been completed or scheduled in the appropriate timeline. If so can send a 90 day supply with 1 refill.     Last completed office visit: 5/13/2024 Berna Pierre APRN   Next scheduled Follow up:   Future Appointments   Date Time Provider Department Center   8/13/2024 10:45 AM Berna Pierre APRN [facility]      Last A1c result: Last A1c value was 5.6% done 5/4/2024.

## 2024-06-28 NOTE — TELEPHONE ENCOUNTER
Patient called office. Date of birth and full name both confirmed.   Needs refill of mounjaro - this is managed by Endocrinology - PRANAY Viera      RN advised patient to reach out to that office. Provided number and transferred call to Endocrinology

## 2024-06-28 NOTE — TELEPHONE ENCOUNTER
Patient called to request a refill for the Mounjaro. Please send refill to the [facility] pharmacy in [place].       Tirzepatide (MOUNJARO) 5 MG/0.5ML Subcutaneous Solution Pen-injector, Inject 5 mg into the skin once a week., Disp: 2 mL, Rfl: 3

## 2024-07-01 ENCOUNTER — TELEPHONE (OUTPATIENT)
Dept: FAMILY MEDICINE CLINIC | Facility: CLINIC | Age: 59
End: 2024-07-01

## 2024-07-01 DIAGNOSIS — I82.4Y2 ACUTE DEEP VEIN THROMBOSIS (DVT) OF PROXIMAL VEIN OF LEFT LOWER EXTREMITY (HCC): Primary | ICD-10-CM

## 2024-07-01 NOTE — TELEPHONE ENCOUNTER
Dosage cannot be switched to 20 mg of Xarelto orally daily to be taken the same time daily.  Prescription sent to the pharmacy.  Please call the patient and let him know.

## 2024-07-01 NOTE — TELEPHONE ENCOUNTER
Spoke with the patient,verified full name and     Informed him of message and instructions below    No further questions.    Old script cancelled

## 2024-07-01 NOTE — TELEPHONE ENCOUNTER
Dr. Means: patient will need new prescription for Xarelto.   Patient calling for refill.     Please confirm dosage.     He was started on xarelto starter pack 5/29/24.    Take As Directed based on package instructions: Days 1-21: 15 mg by mouth twice daily Days 22-30: 20 mg by mouth once daily

## 2024-07-20 ENCOUNTER — APPOINTMENT (OUTPATIENT)
Dept: MRI IMAGING | Facility: HOSPITAL | Age: 59
End: 2024-07-20
Attending: EMERGENCY MEDICINE
Payer: COMMERCIAL

## 2024-07-20 ENCOUNTER — HOSPITAL ENCOUNTER (EMERGENCY)
Facility: HOSPITAL | Age: 59
Discharge: HOME | End: 2024-07-20
Attending: EMERGENCY MEDICINE
Payer: COMMERCIAL

## 2024-07-20 VITALS
TEMPERATURE: 98 F | HEART RATE: 86 BPM | WEIGHT: 200 LBS | DIASTOLIC BLOOD PRESSURE: 103 MMHG | HEIGHT: 71 IN | BODY MASS INDEX: 28 KG/M2 | OXYGEN SATURATION: 100 % | SYSTOLIC BLOOD PRESSURE: 141 MMHG | RESPIRATION RATE: 16 BRPM

## 2024-07-20 DIAGNOSIS — R42 DIZZINESS: Primary | ICD-10-CM

## 2024-07-20 DIAGNOSIS — R10.13 EPIGASTRIC PAIN: ICD-10-CM

## 2024-07-20 LAB
ALBUMIN SERPL-MCNC: 4.2 G/DL (ref 3.2–4.8)
ALP LIVER SERPL-CCNC: 65 U/L
ALT SERPL-CCNC: 16 U/L
ANION GAP SERPL CALC-SCNC: 6 MMOL/L (ref 0–18)
AST SERPL-CCNC: 32 U/L (ref ?–34)
BASOPHILS # BLD AUTO: 0.03 X10(3) UL (ref 0–0.2)
BASOPHILS NFR BLD AUTO: 0.7 %
BILIRUB DIRECT SERPL-MCNC: 0.2 MG/DL (ref ?–0.3)
BILIRUB SERPL-MCNC: 0.6 MG/DL (ref 0.3–1.2)
BUN BLD-MCNC: 11 MG/DL (ref 9–23)
BUN/CREAT SERPL: 8.9 (ref 10–20)
CALCIUM BLD-MCNC: 9 MG/DL (ref 8.7–10.4)
CHLORIDE SERPL-SCNC: 108 MMOL/L (ref 98–112)
CO2 SERPL-SCNC: 26 MMOL/L (ref 21–32)
CREAT BLD-MCNC: 1.23 MG/DL
DEPRECATED RDW RBC AUTO: 45 FL (ref 35.1–46.3)
EGFRCR SERPLBLD CKD-EPI 2021: 68 ML/MIN/1.73M2 (ref 60–?)
EOSINOPHIL # BLD AUTO: 0.06 X10(3) UL (ref 0–0.7)
EOSINOPHIL NFR BLD AUTO: 1.4 %
ERYTHROCYTE [DISTWIDTH] IN BLOOD BY AUTOMATED COUNT: 13.1 % (ref 11–15)
GLUCOSE BLD-MCNC: 129 MG/DL (ref 70–99)
GLUCOSE BLDC GLUCOMTR-MCNC: 118 MG/DL (ref 70–99)
HCT VFR BLD AUTO: 45.2 %
HGB BLD-MCNC: 15.2 G/DL
IMM GRANULOCYTES # BLD AUTO: 0.01 X10(3) UL (ref 0–1)
IMM GRANULOCYTES NFR BLD: 0.2 %
LIPASE SERPL-CCNC: 31 U/L (ref 12–53)
LYMPHOCYTES # BLD AUTO: 1.78 X10(3) UL (ref 1–4)
LYMPHOCYTES NFR BLD AUTO: 41.8 %
MCH RBC QN AUTO: 31.5 PG (ref 26–34)
MCHC RBC AUTO-ENTMCNC: 33.6 G/DL (ref 31–37)
MCV RBC AUTO: 93.8 FL
MONOCYTES # BLD AUTO: 0.52 X10(3) UL (ref 0.1–1)
MONOCYTES NFR BLD AUTO: 12.2 %
NEUTROPHILS # BLD AUTO: 1.86 X10 (3) UL (ref 1.5–7.7)
NEUTROPHILS # BLD AUTO: 1.86 X10(3) UL (ref 1.5–7.7)
NEUTROPHILS NFR BLD AUTO: 43.7 %
OSMOLALITY SERPL CALC.SUM OF ELEC: 291 MOSM/KG (ref 275–295)
PLATELET # BLD AUTO: 156 10(3)UL (ref 150–450)
POTASSIUM SERPL-SCNC: 4 MMOL/L (ref 3.5–5.1)
PROT SERPL-MCNC: 7.2 G/DL (ref 5.7–8.2)
RBC # BLD AUTO: 4.82 X10(6)UL
SODIUM SERPL-SCNC: 140 MMOL/L (ref 136–145)
WBC # BLD AUTO: 4.3 X10(3) UL (ref 4–11)

## 2024-07-20 PROCEDURE — 96360 HYDRATION IV INFUSION INIT: CPT

## 2024-07-20 PROCEDURE — 85025 COMPLETE CBC W/AUTO DIFF WBC: CPT | Performed by: EMERGENCY MEDICINE

## 2024-07-20 PROCEDURE — 99285 EMERGENCY DEPT VISIT HI MDM: CPT

## 2024-07-20 PROCEDURE — 83690 ASSAY OF LIPASE: CPT | Performed by: EMERGENCY MEDICINE

## 2024-07-20 PROCEDURE — 93010 ELECTROCARDIOGRAM REPORT: CPT

## 2024-07-20 PROCEDURE — 99284 EMERGENCY DEPT VISIT MOD MDM: CPT

## 2024-07-20 PROCEDURE — 96361 HYDRATE IV INFUSION ADD-ON: CPT

## 2024-07-20 PROCEDURE — 70551 MRI BRAIN STEM W/O DYE: CPT | Performed by: EMERGENCY MEDICINE

## 2024-07-20 PROCEDURE — 82962 GLUCOSE BLOOD TEST: CPT

## 2024-07-20 PROCEDURE — 80076 HEPATIC FUNCTION PANEL: CPT | Performed by: EMERGENCY MEDICINE

## 2024-07-20 PROCEDURE — 80048 BASIC METABOLIC PNL TOTAL CA: CPT | Performed by: EMERGENCY MEDICINE

## 2024-07-20 PROCEDURE — 93005 ELECTROCARDIOGRAM TRACING: CPT

## 2024-07-20 NOTE — DISCHARGE INSTRUCTIONS
Continue your Prevacid for your epigastric pain, bland diet for the next few days.  Return if increased pain fever vomiting   drink plenty of fluids follow-up with your doctor this week and return if you have any further dizziness or if you develop chest pain palpitations shortness of breath       thin liquid/soft & bite-sized

## 2024-07-20 NOTE — ED PROVIDER NOTES
Patient Seen in: [facility] Emergency Department      History     Chief Complaint   Patient presents with    Headache    Dizziness     Stated Complaint: Ha/ dizziness/ abd pain    Subjective:   HPI    Patient is a 58-year-old male with a history of DVT/pulmonary embolism, diabetes who presents with 1 week of intermittent dizziness/feeling lightheaded especially when he stands up or bends over.  Has had a mild pressure-like headache.  He also has had a tingling sensation to his entire head and face for a week as well.  No vision or speech changes.  No numbness or weakness to his extremities.  No chest pain palpitations or shortness of breath.  He has also had some burning epigastric pain which does get better with Prevacid for the last week as well.  Nausea or vomiting.  He has been eating.  No diarrhea.  No fevers or chills.  He currently denies any headache or dizziness    Objective:   Past Medical History:    Anxiety    Carotid stenosis    Pulmonary embolism (HCC)    Sleep apnea    CPAP    Sleep apnea, obstructive    mild              Past Surgical History:   Procedure Laterality Date    Colonoscopy      Colonoscopy      Colonoscopy N/A 11/20/2019    Procedure: COLONOSCOPY;  Surgeon: Johann Coelho MD;  Location: [facility] ENDOSCOPY    Shoulder surg proc unlisted Left                 Social History     Socioeconomic History    Marital status:    Tobacco Use    Smoking status: Never     Passive exposure: Never    Smokeless tobacco: Never   Vaping Use    Vaping status: Never Used   Substance and Sexual Activity    Alcohol use: Not Currently     Alcohol/week: 2.0 standard drinks of alcohol     Types: 2 Glasses of wine per week     Comment: on ocassion     Drug use: No   Other Topics Concern    Caffeine Concern No     Comment: tea    Exercise Yes     Social Determinants of Health     Financial Resource Strain: Low Risk  (5/8/2024)    Financial Resource Strain     Difficulty of Paying Living Expenses: Not  very hard     Med Affordability: No   Food Insecurity: No Food Insecurity (5/3/2024)    Food Insecurity     Food Insecurity: Never true   Transportation Needs: No Transportation Needs (5/8/2024)    Transportation Needs     Lack of Transportation: No   Housing Stability: Low Risk  (5/3/2024)    Housing Stability     Housing Instability: No              Review of Systems    Positive for stated Chief Complaint: Headache and Dizziness    Other systems are as noted in HPI.  Constitutional and vital signs reviewed.      All other systems reviewed and negative except as noted above.    Physical Exam     ED Triage Vitals [07/20/24 1205]   /75   Pulse 78   Resp 16   Temp 97.7 °F (36.5 °C)   Temp src Oral   SpO2 98 %   O2 Device None (Room air)       Current Vitals:   Vital Signs  BP: (!) 141/103  Pulse: 86  Resp: 16  Temp: 97.7 °F (36.5 °C)  Temp src: Oral  MAP (mmHg): (!) 106    Oxygen Therapy  SpO2: 100 %  O2 Device: None (Room air)            Physical Exam  Vitals and nursing note reviewed.   Constitutional:       General: He is not in acute distress.     Appearance: Normal appearance. He is well-developed. He is not ill-appearing.   HENT:      Head: Normocephalic and atraumatic.      Mouth/Throat:      Mouth: Mucous membranes are moist.   Eyes:      General: No visual field deficit.     Extraocular Movements: Extraocular movements intact.      Conjunctiva/sclera: Conjunctivae normal.      Pupils: Pupils are equal, round, and reactive to light.   Neck:      Vascular: No JVD.   Cardiovascular:      Rate and Rhythm: Normal rate and regular rhythm.      Heart sounds: Normal heart sounds. No murmur heard.  Pulmonary:      Effort: Pulmonary effort is normal.      Breath sounds: Normal breath sounds.   Abdominal:      General: Bowel sounds are normal. There is no distension.      Palpations: Abdomen is soft. There is no mass.      Tenderness: There is no abdominal tenderness. There is no guarding or rebound.    Musculoskeletal:         General: Normal range of motion.      Cervical back: Normal range of motion and neck supple.   Skin:     General: Skin is warm and dry.      Capillary Refill: Capillary refill takes less than 2 seconds.      Findings: No rash.   Neurological:      General: No focal deficit present.      Mental Status: He is alert and oriented to person, place, and time.      Cranial Nerves: No cranial nerve deficit, dysarthria or facial asymmetry.      Sensory: No sensory deficit.      Motor: No weakness.      Coordination: Romberg sign negative. Coordination normal.      Gait: Gait normal.      Deep Tendon Reflexes: Reflexes are normal and symmetric. Reflexes normal.   Psychiatric:         Mood and Affect: Mood normal.         Judgment: Judgment normal.       Differential diagnosis includes stroke, electrolyte abnormality, orthostatic hypotension, dehydration, arrhythmia, vertigo central versus peripheral        ED Course     Labs Reviewed   BASIC METABOLIC PANEL (8) - Abnormal; Notable for the following components:       Result Value    Glucose 129 (*)     BUN/CREA Ratio 8.9 (*)     All other components within normal limits   POCT GLUCOSE - Abnormal; Notable for the following components:    POC Glucose  118 (*)     All other components within normal limits   HEPATIC FUNCTION PANEL (7) - Normal   LIPASE - Normal   CBC WITH DIFFERENTIAL WITH PLATELET    Narrative:     The following orders were created for panel order CBC With Differential With Platelet.  Procedure                               Abnormality         Status                     ---------                               -----------         ------                     CBC W/ DIFFERENTIAL[[id number]]                              Final result                 Please view results for these tests on the individual orders.   RAINBOW DRAW LAVENDER   RAINBOW DRAW LIGHT GREEN   RAINBOW DRAW BLUE   CBC W/ DIFFERENTIAL     EKG    Rate, intervals and axes as  noted on EKG Report.  Rate: 75  Rhythm: Sinus Rhythm  Reading: Within normal limits                          MDM                                         Medical Decision Making  Patient with unusual paresthesias that cover his entire scalp and face and he has been here before for these etiology unclear  Orthostatics negative no dizziness in the emergency department  He states it seems to happen when he stands up suddenly told to drink more water follow-up with his doctor and return if symptoms worsen epigastric pain with no tenderness on exam  Better with Prevacid which she should continue and again follow-up with his doctor  Vital signs stable prior to discharge  Patient comfortable with discharge plan    Problems Addressed:  Dizziness: acute illness or injury  Epigastric pain: acute illness or injury    Amount and/or Complexity of Data Reviewed  External Data Reviewed: radiology and notes.     Details: 5/23/2024 for similar complaints of numbness tingling and headache reviewed along CT scan which was normal  Labs: ordered.  Radiology: ordered and independent interpretation performed.     Details: No intracranial hemorrhage other results per radiologist  ECG/medicine tests: ordered and independent interpretation performed. Decision-making details documented in ED Course.        Disposition and Plan     Clinical Impression:  1. Dizziness    2. Epigastric pain         Disposition:  Discharge  7/20/2024  2:27 pm    Follow-up:  Dawson Means, DO  [address] [place] [id number]  [phone]    Schedule an appointment as soon as possible for a visit in 2 day(s)            Medications Prescribed:  Current Discharge Medication List

## 2024-07-20 NOTE — ED INITIAL ASSESSMENT (HPI)
Pt ambulatory to ED A&O x 4 w/ c/o headache and dizziness x 1 week.  Pt also reporting \"tingling sensation\" to head and face x 1 week.  No focal neuro deficits noted.  Denies vision changes.

## 2024-07-21 LAB
ATRIAL RATE: 75 BPM
P AXIS: 49 DEGREES
P-R INTERVAL: 164 MS
Q-T INTERVAL: 372 MS
QRS DURATION: 82 MS
QTC CALCULATION (BEZET): 415 MS
R AXIS: 1 DEGREES
T AXIS: 24 DEGREES
VENTRICULAR RATE: 75 BPM

## 2024-09-03 ENCOUNTER — NURSE TRIAGE (OUTPATIENT)
Dept: FAMILY MEDICINE CLINIC | Facility: CLINIC | Age: 59
End: 2024-09-03

## 2024-09-03 NOTE — TELEPHONE ENCOUNTER
Action Requested: Summary for Provider     []  Critical Lab, Recommendations Needed  [] Need Additional Advice  []   FYI    []   Need Orders  [] Need Medications Sent to Pharmacy  []  Other     SUMMARY:  Patient states he is diabetic.  For past week has felt numbness in right great toe and top of right foot.  Denies open sores on feet, one sided weakness, or pain.  Wanted to be seen.  Scheduled for 9/04/2024 with Dr Weiler to evaluate.    Future Appointments   Date Time Provider Department Center   9/4/2024  3:30 PM [facility]   9/11/2024  1:45 PM Berna Pierre APRN [facility]   11/21/2024 10:40 AM [facility]   11/25/2024  4:30 PM Valeriano Ellison MD [facility]   1/9/2025 12:30 PM ISABEL, PROCEDURE [id number] None     Reason for call: Acute Numbness in right great toe  Onset: 1 week    Reason for Disposition   Patient wants to be seen    Protocols used: Neurologic Deficit-A-OH

## 2024-09-04 ENCOUNTER — LAB ENCOUNTER (OUTPATIENT)
Dept: LAB | Age: 59
End: 2024-09-04
Attending: FAMILY MEDICINE
Payer: COMMERCIAL

## 2024-09-04 ENCOUNTER — OFFICE VISIT (OUTPATIENT)
Dept: FAMILY MEDICINE CLINIC | Facility: CLINIC | Age: 59
End: 2024-09-04

## 2024-09-04 VITALS
HEART RATE: 76 BPM | BODY MASS INDEX: 27 KG/M2 | DIASTOLIC BLOOD PRESSURE: 77 MMHG | SYSTOLIC BLOOD PRESSURE: 117 MMHG | RESPIRATION RATE: 16 BRPM | TEMPERATURE: 98 F | WEIGHT: 195 LBS

## 2024-09-04 DIAGNOSIS — R20.0 NUMBNESS OF TOES: ICD-10-CM

## 2024-09-04 DIAGNOSIS — R20.0 FACIAL NUMBNESS: ICD-10-CM

## 2024-09-04 DIAGNOSIS — R20.0 NUMBNESS OF TOES: Primary | ICD-10-CM

## 2024-09-04 LAB
EST. AVERAGE GLUCOSE BLD GHB EST-MCNC: 128 MG/DL (ref 68–126)
FOLATE SERPL-MCNC: 12.3 NG/ML (ref 5.4–?)
HBA1C MFR BLD: 6.1 % (ref ?–5.7)
TSI SER-ACNC: 1.03 MIU/ML (ref 0.55–4.78)
VIT B12 SERPL-MCNC: 973 PG/ML (ref 211–911)
VIT D+METAB SERPL-MCNC: 43.5 NG/ML (ref 30–100)

## 2024-09-04 PROCEDURE — 3078F DIAST BP <80 MM HG: CPT | Performed by: FAMILY MEDICINE

## 2024-09-04 PROCEDURE — 84443 ASSAY THYROID STIM HORMONE: CPT

## 2024-09-04 PROCEDURE — 36415 COLL VENOUS BLD VENIPUNCTURE: CPT

## 2024-09-04 PROCEDURE — 99213 OFFICE O/P EST LOW 20 MIN: CPT | Performed by: FAMILY MEDICINE

## 2024-09-04 PROCEDURE — 82746 ASSAY OF FOLIC ACID SERUM: CPT

## 2024-09-04 PROCEDURE — 82607 VITAMIN B-12: CPT

## 2024-09-04 PROCEDURE — 82306 VITAMIN D 25 HYDROXY: CPT

## 2024-09-04 PROCEDURE — 83036 HEMOGLOBIN GLYCOSYLATED A1C: CPT

## 2024-09-04 PROCEDURE — 3044F HG A1C LEVEL LT 7.0%: CPT | Performed by: FAMILY MEDICINE

## 2024-09-04 PROCEDURE — 3074F SYST BP LT 130 MM HG: CPT | Performed by: FAMILY MEDICINE

## 2024-09-04 NOTE — PROGRESS NOTES
HPI:Niko is a 58 year old male who presents for right great toe numbness. Pt saw Podiatrist yesterday and was told he may need to get bunion fixed.  Has been numb for 2 weeks. Diagnosed with diabetes this year.  Takes Mounjaro. Wearing supportive shoes helps with the numbness. Gets more numbness with wearing tight shoes.     Has chronic numbness and tingling in the face.  Has been seen in ER twice for this.  Had abnormal MRI of head.     PMH:    Past Medical History:    Anxiety    Carotid stenosis    Pulmonary embolism (HCC)    Sleep apnea    CPAP    Sleep apnea, obstructive    mild      Alg:  Patient has no known allergies.   Meds:   Current Outpatient Medications on File Prior to Visit   Medication Sig Dispense Refill    rivaroxaban (XARELTO) 20 MG Oral Tab Take 1 tablet (20 mg total) by mouth daily with food. 90 tablet 1    Tirzepatide (MOUNJARO) 5 MG/0.5ML Subcutaneous Solution Pen-injector Inject 5 mg into the skin once a week. 6 mL 1    pantoprazole 20 MG Oral Tab EC Take 1 tablet (20 mg total) by mouth every morning before breakfast. 90 tablet 0    latanoprost 0.005 % Ophthalmic Solution INSTILL 1 DROP IN BOTH EYE(S) DAILY AT BEDTIME      Blood Glucose Monitoring Suppl (ONETOUCH ULTRA 2) w/Device Does not apply Kit 1 strip by In Vitro route daily. 200 kit 0    Glucose Blood (ONETOUCH VERIO) In Vitro Strip Test 2x daily 200 strip 0    OneTouch Delica Lancets 33G Does not apply Misc Test 2 x daily 200 each 0    Multiple Vitamins-Minerals (MULTI FOR HIM 50+) Oral Tab Take by mouth.      Cholecalciferol (VITAMIN D) 50 MCG (2000 UT) Oral Cap Take by mouth.       No current facility-administered medications on file prior to visit.      Tobacco Use: no    Objective:   Gen: AOx3. NAD.  /77   Pulse 76   Temp 97.7 °F (36.5 °C) (Temporal)   Resp 16   Wt 195 lb (88.5 kg)   BMI 27.20 kg/m²       Assessment:/Plan:  Encounter Diagnoses   Name Primary?    Numbness of toes Yes    Facial numbness        May be  due to bunion and tight shoes.  Will check labs to rule out underlying etiology.     Colleen Weiler, DO

## 2024-09-05 ENCOUNTER — TELEPHONE (OUTPATIENT)
Dept: FAMILY MEDICINE CLINIC | Facility: CLINIC | Age: 59
End: 2024-09-05

## 2024-09-05 NOTE — TELEPHONE ENCOUNTER
Patient asking for test results. Patient is aware test results have not yet been reviewed by Dr. Weiler. Result numbers were gone over with patient.

## 2024-09-09 RX ORDER — BLOOD SUGAR DIAGNOSTIC
STRIP MISCELLANEOUS DAILY
Qty: 100 STRIP | Refills: 1 | Status: SHIPPED | OUTPATIENT
Start: 2024-09-09

## 2024-09-09 NOTE — TELEPHONE ENCOUNTER
Endocrine Refill protocol for Glucose testing supplies     Protocol Criteria: PASSED   Appointment with Endocrinology completed in the last 12 months or scheduled in the next 6 months     Verify appointment has been completed or scheduled in the appropriate timeline. If so can send a 90 day supply with 1 refill.     Last completed office visit: 5/13/2024 Berna Pierre APRN   Next scheduled Follow up:   Future Appointments   Date Time Provider Department Center   9/11/2024  1:45 PM Gabby, Berna Mackenzie, APRN [facility]

## 2024-09-11 ENCOUNTER — OFFICE VISIT (OUTPATIENT)
Dept: ENDOCRINOLOGY CLINIC | Facility: CLINIC | Age: 59
End: 2024-09-11

## 2024-09-11 VITALS
SYSTOLIC BLOOD PRESSURE: 104 MMHG | WEIGHT: 194 LBS | HEIGHT: 71 IN | BODY MASS INDEX: 27.16 KG/M2 | DIASTOLIC BLOOD PRESSURE: 66 MMHG | HEART RATE: 85 BPM

## 2024-09-11 DIAGNOSIS — E11.9 CONTROLLED TYPE 2 DIABETES MELLITUS WITHOUT COMPLICATION, WITHOUT LONG-TERM CURRENT USE OF INSULIN (HCC): Primary | ICD-10-CM

## 2024-09-11 LAB
GLUCOSE BLOOD: 109
TEST STRIP LOT #: NORMAL NUMERIC

## 2024-09-11 PROCEDURE — 3078F DIAST BP <80 MM HG: CPT

## 2024-09-11 PROCEDURE — 3008F BODY MASS INDEX DOCD: CPT

## 2024-09-11 PROCEDURE — 3074F SYST BP LT 130 MM HG: CPT

## 2024-09-11 PROCEDURE — 99214 OFFICE O/P EST MOD 30 MIN: CPT

## 2024-09-11 PROCEDURE — 82947 ASSAY GLUCOSE BLOOD QUANT: CPT

## 2024-09-11 NOTE — PROGRESS NOTES
Name: Niko Mast  Date: 9/11/24    Referring Physician: No ref. provider found    HISTORY OF PRESENT ILLNESS   Niko Mast is a 58 year old male who presents for follow up for diabetes mellitus     Diabetes History:  Diagnosed- new diagnosis - found during preoperative work up for hip surgery a within the last 6 months     DM FH  - mother   - cousins and uncles on mom's side     Prior glycohemoglobin were: 7.5% 1/2024;  5.6% 5/2024; 6.1% 9/2024   Glucose in clinic today - 109 mg/dl     Dietary compliance: Good- he has made dietary changes since diagnosis and is following low CHO diet- avoiding breads.      Recall:     Breakfast- egg whites and oatmeal   Lunch- salads   Dinner- fish or chicken with vegetable, fruits   Snack- grapes   Beverages- water was drinking coke zero before.     Exercise: Yes- physical therapy once weekly. Also going to gym 4-5 days weekly     Polyuria/polydipsia: No  Blurred vision: No    Episodes of hypoglycemia: No  Blood Glucose:    Fasting- 120- 135     Current DM Regimen:  Mounjaro 5 mg subcutaneous weekly     Modifying factors:  Medication adherence: Yes   Recent steroids, illness or infections: No     REVIEW OF SYSTEMS  Eyes: Diabetic retinopathy present: No            Most recent visit to eye doctor in last 12 months: Yes- 4/2024    CV: Cardiovascular disease present: No         Hypertension present: No         Hyperlipidemia present: Yes         Peripheral Vascular Disease present: No    : Nephropathy present: None known     Neuro: Neuropathy present: Some numbness to R. Big toe only, complaining of facial numbness - was seen in ED and by primary care but workup unremarkable so far.     Skin: Infection or ulceration: No    Osteoporosis: No    Thyroid disease: No      Medications:     Current Outpatient Medications:     ONETOUCH ULTRA In Vitro Strip, USE 1 STRIP EVERY DAY, Disp: 100 strip, Rfl: 1    Tirzepatide (MOUNJARO) 5 MG/0.5ML Subcutaneous Solution Pen-injector,  Inject 5 mg into the skin once a week., Disp: 6 mL, Rfl: 1    rivaroxaban (XARELTO) 20 MG Oral Tab, Take 1 tablet (20 mg total) by mouth daily with food., Disp: 90 tablet, Rfl: 1    pantoprazole 20 MG Oral Tab EC, Take 1 tablet (20 mg total) by mouth every morning before breakfast., Disp: 90 tablet, Rfl: 0    latanoprost 0.005 % Ophthalmic Solution, INSTILL 1 DROP IN BOTH EYE(S) DAILY AT BEDTIME, Disp: , Rfl:     Blood Glucose Monitoring Suppl (ONETOUCH ULTRA 2) w/Device Does not apply Kit, 1 strip by In Vitro route daily., Disp: 200 kit, Rfl: 0    OneTouch Delica Lancets 33G Does not apply Misc, Test 2 x daily, Disp: 200 each, Rfl: 0    Multiple Vitamins-Minerals (MULTI FOR HIM 50+) Oral Tab, Take by mouth., Disp: , Rfl:     Cholecalciferol (VITAMIN D) 50 MCG (2000 UT) Oral Cap, Take by mouth., Disp: , Rfl:      Allergies:   No Known Allergies    Social History:   Social History     Socioeconomic History    Marital status:    Tobacco Use    Smoking status: Never     Passive exposure: Never    Smokeless tobacco: Never   Vaping Use    Vaping status: Never Used   Substance and Sexual Activity    Alcohol use: Not Currently     Alcohol/week: 2.0 standard drinks of alcohol     Types: 2 Glasses of wine per week     Comment: on ocassion     Drug use: No   Other Topics Concern    Caffeine Concern No     Comment: tea    Exercise Yes       Medical History:   Past Medical History:    Anxiety    Carotid stenosis    Pulmonary embolism (HCC)    Sleep apnea    CPAP    Sleep apnea, obstructive    mild       Surgical history:   Past Surgical History:   Procedure Laterality Date    Colonoscopy      Colonoscopy      Colonoscopy N/A 11/20/2019    Procedure: COLONOSCOPY;  Surgeon: Johann Coelho MD;  Location: [facility] ENDOSCOPY    Shoulder surg proc unlisted Left          PHYSICAL EXAM  Vitals:    09/11/24 1341   BP: 104/66   Pulse: 85   Weight: 194 lb (88 kg)   Height: 5' 11\" (1.803 m)       General Appearance:  alert, well  developed, in no acute distress  Eyes:  normal conjunctivae, sclera, and normal pupils  Neck: Trachea midline: Normal  Back: no kyphosis or back tenderness  Respiratory:  clear to auscultation bilaterally  Lymph Nodes:  No abnormal nodes noted  Musculoskeletal:  normal muscle strength and tone  Skin:  normal moisture and skin texture  Hair & Nails:  normal scalp hair     Hematologic:  no excessive bruising  Neuro:  sensory grossly intact and motor grossly intact.  Psychiatric:  oriented to time, self, and place  Nutritional:  no abnormal weight gain or loss      ASSESSMENT/PLAN:    Diabetes mellitus type 2 controlled   -HgA1c- 6.1% POC today   -Congratulated patient on improved glycemic control  -Reviewed  diagnosis of diabetes   -Reviewed ABC's of diabetes   - Reviewed pathogenesis of diabetes.   - Reviewed importance of good glycemic control to prevent microvascular and macrovascular complications including nephropathy, neuropathy, retinopathy, and cardiovascular disease.  - Reviewed importance of SBGM- check glucose 1 time daily   - Reviewed target glucose goals for patient  fasting and <180 (ideally <160) post prandially   - Reviewed importance of following diabetic diet- recommended 135 grams of CHO per day or 45 grams per meal.   - Provided patient education materials  - Reviewed importance of SBGM- continue to check glucose twice daily      - He is concerned Mounjaro may be cause of facial numbness- had workup in the ED including MRI of the brain and was seen by PCP but workup normal. Will trial stopping medication x 1 month.     -Stop Mounjaro x 1 month. Continue to monitor sugars closely and call if sugars persistently >160's. Discussed that if symptoms improve off medication will monitor if sugars at goal with diet/exercise or will transition to alternative medication.     - Reviewed importance of yearly optho follow up. Now UTD with optho- last visit 4/2024  - Lipids 5/2024- LDL- 99    - No  nephropathy    - Has been following with podiatry- last visit and foot exam 7/2024    RTC in 3 months   9/11/24   Berna Pierre, APRN

## 2024-09-18 ENCOUNTER — TELEPHONE (OUTPATIENT)
Dept: FAMILY MEDICINE CLINIC | Facility: CLINIC | Age: 59
End: 2024-09-18

## 2024-09-18 NOTE — TELEPHONE ENCOUNTER
Patient dropped off forms, LISHA was signed fee was paid. Forms will be sent to the forms department.

## 2024-09-20 RX ORDER — PANTOPRAZOLE SODIUM 20 MG/1
20 TABLET, DELAYED RELEASE ORAL
Qty: 90 TABLET | Refills: 3 | Status: SHIPPED | OUTPATIENT
Start: 2024-09-20

## 2024-09-20 NOTE — TELEPHONE ENCOUNTER
Refill passed per [facility] protocols.    Requested Prescriptions   Pending Prescriptions Disp Refills    PANTOPRAZOLE 20 MG Oral Tab EC [Pharmacy Med Name: PANTOPRAZOLE SOD DR 20 MG TAB] 90 tablet 3     Sig: Take 1 tablet (20 mg total) by mouth every morning before breakfast.       Gastrointestional Medication Protocol Passed - 9/17/2024 12:05 AM        Passed - In person appointment or virtual visit in the past 12 mos or appointment in next 3 mos     Recent Outpatient Visits              1 week ago Controlled type 2 diabetes mellitus without complication, without long-term current use of insulin ([facility])    [facility] Berna Pierre APRN    Office Visit    2 weeks ago Numbness of toes    [facility] Weiler, Colleen M, DO    Office Visit    3 months ago Acute saddle pulmonary embolism, unspecified whether acute cor pulmonale present ([facility])    [facility] Valeriano Ellison MD    Office Visit    3 months ago New onset type 2 diabetes mellitus ([facility])    [facility]    Nurse Only    3 months ago Screening for colon cancer    [facility] Dawson Means,     Office Visit          Future Appointments         Provider Department Appt Notes    In 2 months Dawson Means DO [facility] AWV    In 2 months Valeriano Ellison MD [facility] Follow Up per dr LEYVA    In 2 months Berna Pierre APRN [facility] 3 month follow up    In 3 months ALFONSO HALL [facility]

## 2024-09-27 NOTE — TELEPHONE ENCOUNTER
[facility] forms received and logged for processing. Patient requested on LISHA to  completed forms. Encounter is with  but patient has surgery scheduled with  unsure of which provider is to complete.

## 2024-10-02 NOTE — TELEPHONE ENCOUNTER
Dr. Means,    Patient is seeking a re-certification on his Family Medical Leave Act. Forms that were previously completed in June were not accepted because the patient did not qualify for Family Medical Leave Act at that time.    Patient is requesting 1-4 flare ups a month, each episode lasting 1-2 days and 1-4 appts a year, each appointment lasting 1-4 hours.    Do you support?    Rosa

## 2024-10-02 NOTE — TELEPHONE ENCOUNTER
Type of Leave:  Intermittent Family Medical Leave Act (12 month re-cert)  Reason for Leave:  pulmonary embolisms (recurrent) & LEONEL    Start date of leave:  9/23/24    End date of leave:  9/23/25  How many flare ups per month/length?:  1-4 flare ups a month, each episode lasting 1-2 days  How many appts per month/length?:  1-4 appts a year, each appointment lasting 1-4 hours    Was Fee and Turnaround info Given?:  yes, 7-10 business day turn around time

## 2024-10-03 ENCOUNTER — TELEPHONE (OUTPATIENT)
Dept: ENDOCRINOLOGY CLINIC | Facility: CLINIC | Age: 59
End: 2024-10-03

## 2024-10-03 NOTE — TELEPHONE ENCOUNTER
Kandy -- spoke to pt. Pt called stating his BG have been higher than normal, reports the highest fasting he's seen is 176.     RN requesting pt to send MC or call us with BG readings once he is off work. Stated he will do so.     Hyperglycemia     Onset of hyperglycemia: Monday    BG levels: pt checks via glucometer. Pt was unable to provide BG as he is at work.     Symptoms: denies. Pt reports urinary frequency for the second week now    Pattern of hyperglycemia: fasting sugars    Steroid therapy: denies    Acute Illness: denies     Change in Diet: denies. Pt usually eats fish, salads, fruits.     List DM Medications/Compliance:  Mounjaro - > currently being held for 1 month per LOV. Pt confirmed he is holding.

## 2024-10-03 NOTE — TELEPHONE ENCOUNTER
We had held the Mounjaro as he was concerned this was causing numbness. Please confirm if this symptom is better of the Mounjaro or if he is still having the numbness. If still having numbness then not related to Mounjaro and I would recommend we restart the medication. If he prefers to try alternative then I think we should start him on Farxiga. Let me know, thanks.

## 2024-10-03 NOTE — TELEPHONE ENCOUNTER
PRANAY Gaitan,  Spoke to patient to relay message below - patient stated numbness is better - has noticed it coming back a bit which he thinks is d/t high BG  Patient is agreeable to going back to mounjaro 5mg weekly for next couple of weeks and then providing update on BG and symptoms  Thanks

## 2024-10-11 ENCOUNTER — TELEPHONE (OUTPATIENT)
Dept: FAMILY MEDICINE CLINIC | Facility: CLINIC | Age: 59
End: 2024-10-11

## 2024-10-11 DIAGNOSIS — I82.4Y2 ACUTE DEEP VEIN THROMBOSIS (DVT) OF PROXIMAL VEIN OF LEFT LOWER EXTREMITY (HCC): ICD-10-CM

## 2024-10-11 NOTE — TELEPHONE ENCOUNTER
[facility] here and [facility] [place] is the [address] [place] [address]. I called the patient back and told him we will send the medication short supply to that [facility]. He will pick it up there.

## 2024-10-11 NOTE — TELEPHONE ENCOUNTER
No, please have patient find local pharmacy and I can send in script to [place]. Needs to be taking medication.

## 2024-10-11 NOTE — TELEPHONE ENCOUNTER
Patient flew into [facility] today and forgot his Xarelto. He took his 20 mg pill this morning but won't have it for Saturday or Sunday and is flying back home Monday. He's wondering if it's ok to miss 2 days    Please advise.    RN's call [phone] (ok to leave message)

## 2024-10-25 ENCOUNTER — NURSE TRIAGE (OUTPATIENT)
Dept: FAMILY MEDICINE CLINIC | Facility: CLINIC | Age: 59
End: 2024-10-25

## 2024-10-25 NOTE — TELEPHONE ENCOUNTER
Action Requested: Summary for Provider     []  Critical Lab, Recommendations Needed  [x] Need Additional Advice  []   FYI    []   Need Orders  [x] Need Medications Sent to Pharmacy  []  Other     SUMMARY: Patient called to inquire if he can take tylenol for back pain when on xarelto?    Last Wednesday 16th patient developed lower back pain with pain scale at 7. No injury involved. Tried ice paks one time. Able to walk. Currently on xarelto 20 mg daily and ask if he can take tylenol for his pain? History of pulmonary embolism and deep vein thrombosis.     Advise patient not to take tylenol until MD advises further. Possible drug interaction. Advised to apply heating pain covered with towel for 5-10 minutes as needed. Can soak in bath with epson salt to relax the muscles. Do gentle stretching exercises, avoid strenuous activities. Patient verbalized understanding.    Tasked to Dr Means to advise on this matter.     Please reply to pool: EM RN TRIAGE      Reason for call: Back Pain  Onset: Data Unavailable                         Reason for Disposition   Caused by a twisting, bending, or lifting injury    Protocols used: Back Pain-A-[place]

## 2024-10-25 NOTE — TELEPHONE ENCOUNTER
Reviewed Dr ANNITA Means message below with patient who verbalized understanding.    Please reply to pool: EM RN TRIAGE

## 2024-10-29 ENCOUNTER — HOSPITAL ENCOUNTER (EMERGENCY)
Facility: HOSPITAL | Age: 59
Discharge: HOME | End: 2024-10-29
Attending: STUDENT IN AN ORGANIZED HEALTH CARE EDUCATION/TRAINING PROGRAM
Payer: COMMERCIAL

## 2024-10-29 VITALS
SYSTOLIC BLOOD PRESSURE: 122 MMHG | TEMPERATURE: 98 F | OXYGEN SATURATION: 99 % | RESPIRATION RATE: 15 BRPM | HEART RATE: 68 BPM | DIASTOLIC BLOOD PRESSURE: 72 MMHG

## 2024-10-29 DIAGNOSIS — R10.13 DYSPEPSIA: ICD-10-CM

## 2024-10-29 DIAGNOSIS — S39.012A BACK STRAIN, INITIAL ENCOUNTER: Primary | ICD-10-CM

## 2024-10-29 LAB
ALBUMIN SERPL-MCNC: 4.3 G/DL (ref 3.2–4.8)
ALP LIVER SERPL-CCNC: 75 U/L
ALT SERPL-CCNC: 16 U/L
ANION GAP SERPL CALC-SCNC: 6 MMOL/L (ref 0–18)
AST SERPL-CCNC: 23 U/L (ref ?–34)
BASOPHILS # BLD AUTO: 0.02 X10(3) UL (ref 0–0.2)
BASOPHILS NFR BLD AUTO: 0.5 %
BILIRUB DIRECT SERPL-MCNC: 0.2 MG/DL (ref ?–0.3)
BILIRUB SERPL-MCNC: 0.6 MG/DL (ref 0.3–1.2)
BUN BLD-MCNC: 11 MG/DL (ref 9–23)
BUN/CREAT SERPL: 9.4 (ref 10–20)
CALCIUM BLD-MCNC: 9.5 MG/DL (ref 8.7–10.4)
CHLORIDE SERPL-SCNC: 107 MMOL/L (ref 98–112)
CO2 SERPL-SCNC: 28 MMOL/L (ref 21–32)
CREAT BLD-MCNC: 1.17 MG/DL
DEPRECATED RDW RBC AUTO: 48.5 FL (ref 35.1–46.3)
EGFRCR SERPLBLD CKD-EPI 2021: 72 ML/MIN/1.73M2 (ref 60–?)
EOSINOPHIL # BLD AUTO: 0.11 X10(3) UL (ref 0–0.7)
EOSINOPHIL NFR BLD AUTO: 2.8 %
ERYTHROCYTE [DISTWIDTH] IN BLOOD BY AUTOMATED COUNT: 13.6 % (ref 11–15)
GLUCOSE BLD-MCNC: 118 MG/DL (ref 70–99)
HCT VFR BLD AUTO: 42.7 %
HGB BLD-MCNC: 14.3 G/DL
IMM GRANULOCYTES # BLD AUTO: 0 X10(3) UL (ref 0–1)
IMM GRANULOCYTES NFR BLD: 0 %
LIPASE SERPL-CCNC: 37 U/L (ref 12–53)
LYMPHOCYTES # BLD AUTO: 1.62 X10(3) UL (ref 1–4)
LYMPHOCYTES NFR BLD AUTO: 41.1 %
MCH RBC QN AUTO: 31.9 PG (ref 26–34)
MCHC RBC AUTO-ENTMCNC: 33.5 G/DL (ref 31–37)
MCV RBC AUTO: 95.3 FL
MONOCYTES # BLD AUTO: 0.46 X10(3) UL (ref 0.1–1)
MONOCYTES NFR BLD AUTO: 11.7 %
NEUTROPHILS # BLD AUTO: 1.73 X10 (3) UL (ref 1.5–7.7)
NEUTROPHILS # BLD AUTO: 1.73 X10(3) UL (ref 1.5–7.7)
NEUTROPHILS NFR BLD AUTO: 43.9 %
OSMOLALITY SERPL CALC.SUM OF ELEC: 292 MOSM/KG (ref 275–295)
PLATELET # BLD AUTO: 183 10(3)UL (ref 150–450)
POTASSIUM SERPL-SCNC: 4.3 MMOL/L (ref 3.5–5.1)
PROT SERPL-MCNC: 7.4 G/DL (ref 5.7–8.2)
RBC # BLD AUTO: 4.48 X10(6)UL
SODIUM SERPL-SCNC: 141 MMOL/L (ref 136–145)
WBC # BLD AUTO: 3.9 X10(3) UL (ref 4–11)

## 2024-10-29 PROCEDURE — 83690 ASSAY OF LIPASE: CPT | Performed by: STUDENT IN AN ORGANIZED HEALTH CARE EDUCATION/TRAINING PROGRAM

## 2024-10-29 PROCEDURE — 80048 BASIC METABOLIC PNL TOTAL CA: CPT | Performed by: STUDENT IN AN ORGANIZED HEALTH CARE EDUCATION/TRAINING PROGRAM

## 2024-10-29 PROCEDURE — 96374 THER/PROPH/DIAG INJ IV PUSH: CPT

## 2024-10-29 PROCEDURE — 85025 COMPLETE CBC W/AUTO DIFF WBC: CPT | Performed by: STUDENT IN AN ORGANIZED HEALTH CARE EDUCATION/TRAINING PROGRAM

## 2024-10-29 PROCEDURE — S0028 INJECTION, FAMOTIDINE, 20 MG: HCPCS | Performed by: STUDENT IN AN ORGANIZED HEALTH CARE EDUCATION/TRAINING PROGRAM

## 2024-10-29 PROCEDURE — 99284 EMERGENCY DEPT VISIT MOD MDM: CPT

## 2024-10-29 PROCEDURE — 80076 HEPATIC FUNCTION PANEL: CPT | Performed by: STUDENT IN AN ORGANIZED HEALTH CARE EDUCATION/TRAINING PROGRAM

## 2024-10-29 RX ORDER — CYCLOBENZAPRINE HCL 10 MG
10 TABLET ORAL ONCE
Status: COMPLETED | OUTPATIENT
Start: 2024-10-29 | End: 2024-10-29

## 2024-10-29 RX ORDER — FAMOTIDINE 20 MG/1
20 TABLET, FILM COATED ORAL 2 TIMES DAILY PRN
Qty: 30 TABLET | Refills: 0 | Status: SHIPPED | OUTPATIENT
Start: 2024-10-29 | End: 2024-11-28

## 2024-10-29 RX ORDER — FAMOTIDINE 10 MG/ML
20 INJECTION, SOLUTION INTRAVENOUS ONCE
Status: COMPLETED | OUTPATIENT
Start: 2024-10-29 | End: 2024-10-29

## 2024-10-29 RX ORDER — LIDOCAINE 50 MG/G
1 PATCH TOPICAL EVERY 24 HOURS
Qty: 7 PATCH | Refills: 0 | Status: SHIPPED | OUTPATIENT
Start: 2024-10-29 | End: 2024-11-05

## 2024-10-29 RX ORDER — CYCLOBENZAPRINE HCL 10 MG
10 TABLET ORAL 3 TIMES DAILY PRN
Qty: 20 TABLET | Refills: 0 | Status: SHIPPED | OUTPATIENT
Start: 2024-10-29 | End: 2024-11-05

## 2024-10-29 NOTE — ED INITIAL ASSESSMENT (HPI)
Patient ambulatory to ED c.o lower back pain on both L and R side x 2 weeks, stomach pain earlier this AM described as \"achy,\" it started prior to eating anything but stomach did feel better after drinking some water, denies N/V/D, fevers.

## 2024-10-29 NOTE — ED QUICK NOTES
Rounded on pt. Updated pt on plan of care. Pt stated understanding. Pt sitting on ED stretcher in a position of comfort.

## 2024-10-29 NOTE — DISCHARGE INSTRUCTIONS
Thank you for seeking care at [facility] Emergency Department.  You have been seen and evaluated for back pain.      We discussed the results of your workup   Please read the instructions provided   If given prescriptions, take as instructed    Remember, your care process does not end after your visit today. Please follow-up with your doctor within 1-2 days for a follow-up check to ensure you are  improving, to see if you need any further evaluation/testing, or to evaluate for any alternate diagnoses.    Please return to the emergency department if you develop severe pain that is not controlled by pain medications, loss of control of your legs, if you are unable to walk because of pain or weakness, worsening numbness or weakness, loss of bowel or bladder control (dribbling of urine or having accidents you wouldn't normally have), inability to urinate, numbness of your genital or anal area, fevers, abdominal pain, change in urination, or as these could all be signs of a serious medical emergency. Call or return to the ER for any other new or worsening symptoms.

## 2024-10-30 NOTE — ED PROVIDER NOTES
[place] Emergency Department Note  Patient: Niko Mast Age: 59 year old Sex: male      MRN: [id number]  : 1965    Patient Seen in: [facility] Emergency Department    History     Chief Complaint   Patient presents with    Back Pain     Stated Complaint: Back pain    History obtained from: Patient    59-year-old male with a past medical history of pulmonary embolism on Xarelto, carotid artery stenosis, anxiety presenting today for evaluation of low back pain.  He states that he has been having pain in his lower back over the past 2 weeks after laying flat on his back to sleep which is an abnormal position for him.  He states that he has been having aching in bilateral lumbar back.  States that today, he had 3-second long episodes of sharp pain in his lower back prompting evaluation in the emergency department.  He also states that he had a aching feeling in his epigastrium that resolved after drinking water earlier this morning.  No recurrence of symptoms.  Otherwise denies any nausea, vomiting, diarrhea, fevers or chills.  Denies any urinary symptoms.  Denies any bladder or bowel incontinence.  Denies any associated falls, trauma or injury.  Denies any swelling, numbness or weakness in his lower extremities.  He states that he took a dose of over-the-counter pain medications for his back pain yesterday which improved his symptoms briefly.    Review of Systems:  Review of Systems  Positive for stated complaint: Back pain. Constitutional and vital signs reviewed. All other systems reviewed and negative except as noted above.    Patient History:  Past Medical History:    Anxiety    Carotid stenosis    Pulmonary embolism (HCC)    Sleep apnea    CPAP    Sleep apnea, obstructive    mild       Past Surgical History:   Procedure Laterality Date    Colonoscopy      Colonoscopy      Colonoscopy N/A 2019    Procedure: COLONOSCOPY;  Surgeon: Johann Coelho MD;  Location: [facility] ENDOSCOPY    Shoulder  surg proc unlisted Left         Family History   Problem Relation Age of Onset    Cancer Father 72        colon cancer       Specific Social Determinants of Health:   Social History     Socioeconomic History    Marital status:    Tobacco Use    Smoking status: Never     Passive exposure: Never    Smokeless tobacco: Never   Vaping Use    Vaping status: Never Used   Substance and Sexual Activity    Alcohol use: Yes     Alcohol/week: 2.0 standard drinks of alcohol     Types: 2 Glasses of wine per week     Comment: on ocassion     Drug use: No   Other Topics Concern    Caffeine Concern No     Comment: tea    Exercise Yes     Social Drivers of Health     Financial Resource Strain: Low Risk  (5/8/2024)    Financial Resource Strain     Difficulty of Paying Living Expenses: Not very hard     Med Affordability: No   Food Insecurity: No Food Insecurity (5/3/2024)    Food Insecurity     Food Insecurity: Never true   Transportation Needs: No Transportation Needs (5/8/2024)    Transportation Needs     Lack of Transportation: No   Housing Stability: Low Risk  (5/3/2024)    Housing Stability     Housing Instability: No           PSFH elements reviewed from today and agreed except as otherwise stated in HPI.    Physical Exam     ED Triage Vitals [10/29/24 1005]   /83   Pulse 83   Resp 16   Temp 97.8 °F (36.6 °C)   Temp src Temporal   SpO2 97 %   O2 Device None (Room air)       Current:/72   Pulse 68   Temp 97.8 °F (36.6 °C) (Temporal)   Resp 15   SpO2 99%         Physical Exam  Constitutional:       Appearance: He is well-developed.   HENT:      Head: Normocephalic and atraumatic.      Right Ear: External ear normal.      Left Ear: External ear normal.      Nose: Nose normal.   Eyes:      Conjunctiva/sclera: Conjunctivae normal.      Pupils: Pupils are equal, round, and reactive to light.   Cardiovascular:      Rate and Rhythm: Normal rate and regular rhythm.      Heart sounds: Normal heart sounds.    Pulmonary:      Effort: Pulmonary effort is normal.      Breath sounds: Normal breath sounds.   Abdominal:      General: Bowel sounds are normal.      Palpations: Abdomen is soft.      Tenderness: There is no abdominal tenderness.   Musculoskeletal:         General: Normal range of motion.      Cervical back: Normal range of motion and neck supple.      Comments: No midline neck or back tenderness, no significant tenderness of bilateral paraspinal muscles of the upper and lower back   Skin:     General: Skin is warm and dry.      Findings: No rash.   Neurological:      General: No focal deficit present.      Mental Status: He is alert and oriented to person, place, and time.      Sensory: No sensory deficit.      Motor: No weakness.      Coordination: Coordination normal.      Deep Tendon Reflexes: Reflexes are normal and symmetric.   Psychiatric:         Mood and Affect: Mood normal.         Behavior: Behavior normal.         ED Course   Labs:   Labs Reviewed   BASIC METABOLIC PANEL (8) - Abnormal; Notable for the following components:       Result Value    Glucose 118 (*)     BUN/CREA Ratio 9.4 (*)     All other components within normal limits   CBC WITH DIFFERENTIAL WITH PLATELET - Abnormal; Notable for the following components:    WBC 3.9 (*)     RDW-SD 48.5 (*)     All other components within normal limits   HEPATIC FUNCTION PANEL (7) - Normal   LIPASE - Normal     Radiology findings:  I personally reviewed the images.   No results found.      Cardiac Monitor: Interpreted by me.   Pulse Readings from Last 1 Encounters:   10/29/24 68   , sinus,     External non-ED records reviewed independently by me: PCP note from 9/11/2024 reviewed confirming patient has an additional medical history of type 2 diabetes and is currently not on any medications.    MDM   59-year-old male with a past medical history of pulmonary embolism on Xarelto, carotid artery stenosis, anxiety, diet-controlled type 2 diabetes presenting  today for evaluation of atraumatic low back pain over the past 2 weeks also with brief episode of epigastric abdominal discomfort which has resolved spontaneously.  On exam, patient is well-appearing and in no acute distress.  He is no focal neurologic deficits.  No reproducible back or abdominal tenderness.    Differential diagnoses considered includes, but is not limited to: Dyspepsia, biliary obstructive process, biliary colic, pancreatitis, musculoskeletal back strain, concern sciatica, piriformis syndrome, or other peripheral nerve compression however symptoms are bilateral with no radicular symptoms.    Will obtain the following tests: CBC, BMP, hepatic function panel, lipase  Please see ED course for my independent review of these tests/imaging results.    Initial Medications/Therapeutics administered: Lidocaine patch, Flexeril, Pepcid    Chronic conditions affecting care: Pulmonary embolism, carotid artery stenosis, anxiety, diet-controlled type 2 diabetes       ED course: Labs reassuring.  No significant leukocytosis.  No evidence of biliary obstructive process.  Upon reevaluation, noted to have significant improvement of his back pain with no recurrence of his epigastric abdominal discomfort.  I suspect his abdominal discomfort is secondary to brief period of dyspepsia.  Will start on Pepcid as needed.  Recommend close monitoring of symptoms.  I suspect his back pain is likely secondary to musculoskeletal strain.  He has no associated neurologic deficits to suggest acute spinal cord compression.  No associated trauma.  No midline back tenderness.  No fevers or other infectious etiologies.  Pain improved following analgesic support in the emergency department.  Stable for discharge home at this time with close PCP follow-up.  Return precautions were discussed and questions answered.  Patient expressed understanding and agreement with plan.        Disposition and Plan     Clinical Impression:  1. Back  strain, initial encounter    2. Dyspepsia        Disposition:  Discharge    Follow-up:  Dawson Means, DO  [address] [address]  [phone]    Schedule an appointment as soon as possible for a visit in 2 day(s)  As needed, If symptoms worsen      Medications Prescribed:  Discharge Medication List as of 10/29/2024 12:52 PM        START taking these medications    Details   lidocaine 5 % External Patch Place 1 patch onto the skin daily for 7 days., Normal, Disp-7 patch, R-0      cyclobenzaprine 10 MG Oral Tab Take 1 tablet (10 mg total) by mouth 3 (three) times daily as needed for Muscle spasms., Normal, Disp-20 tablet, R-0      famotidine (PEPCID) 20 MG Oral Tab Take 1 tablet (20 mg total) by mouth 2 (two) times daily as needed for Heartburn., Normal, Disp-30 tablet, R-0               This note may have been created using voice dictation technology and may include inadvertent errors.      Brittney Schaeffer MD  Emergency Medicine

## 2024-11-08 ENCOUNTER — TELEPHONE (OUTPATIENT)
Dept: ENDOCRINOLOGY CLINIC | Facility: CLINIC | Age: 59
End: 2024-11-08

## 2024-11-08 DIAGNOSIS — E11.9 CONTROLLED TYPE 2 DIABETES MELLITUS WITHOUT COMPLICATION, WITHOUT LONG-TERM CURRENT USE OF INSULIN (HCC): Primary | ICD-10-CM

## 2024-11-11 NOTE — TELEPHONE ENCOUNTER
Called pt. Pt requested RN to call back in 20 minutes to discuss potentially restarting mounjaro.     Pt is out right now and could not provide me with BG update.     Per TE on 10/3: pt was to already restart mounjaro 5 mg.

## 2024-11-12 RX ORDER — TIRZEPATIDE 5 MG/.5ML
1 INJECTION, SOLUTION SUBCUTANEOUS WEEKLY
Qty: 6 ML | Refills: 1 | Status: SHIPPED | OUTPATIENT
Start: 2024-11-12

## 2024-11-12 NOTE — TELEPHONE ENCOUNTER
Spoke to pt. Provided recommendations written below by Kandy PIRES. Pt notified of RX being sent. Verbalized understanding. Denies further questions or concerns.

## 2024-11-12 NOTE — TELEPHONE ENCOUNTER
Kandy -- spoke to pt. Pt states he ran out of mounjaro 5 mg 2 weeks ago and never got a refill. There was no refill request on file. Pt hasn't taken dose in 2 weeks. RX pended.    Pt now wants to know if you still want him to remain on this dose? Pt checks sugars via glucometer, sometimes 3x daily but mostly fasting.      Fasting  2 hrs after lunch Before dinner   11/11 153     11/10 Did  Not  Check    11/9 139     11/8 137 127 148   11/7 141 150 194

## 2024-11-21 ENCOUNTER — LAB ENCOUNTER (OUTPATIENT)
Dept: LAB | Age: 59
End: 2024-11-21
Attending: FAMILY MEDICINE
Payer: COMMERCIAL

## 2024-11-21 ENCOUNTER — OFFICE VISIT (OUTPATIENT)
Dept: FAMILY MEDICINE CLINIC | Facility: CLINIC | Age: 59
End: 2024-11-21

## 2024-11-21 VITALS
HEIGHT: 71 IN | SYSTOLIC BLOOD PRESSURE: 101 MMHG | DIASTOLIC BLOOD PRESSURE: 68 MMHG | OXYGEN SATURATION: 96 % | RESPIRATION RATE: 18 BRPM | WEIGHT: 201 LBS | BODY MASS INDEX: 28.14 KG/M2 | HEART RATE: 79 BPM | TEMPERATURE: 98 F

## 2024-11-21 DIAGNOSIS — Z23 INFLUENZA VACCINE NEEDED: ICD-10-CM

## 2024-11-21 DIAGNOSIS — Z00.00 ROUTINE PHYSICAL EXAMINATION: ICD-10-CM

## 2024-11-21 DIAGNOSIS — Z12.11 COLON CANCER SCREENING: Primary | ICD-10-CM

## 2024-11-21 LAB
ALBUMIN SERPL-MCNC: 4.6 G/DL (ref 3.2–4.8)
ALBUMIN/GLOB SERPL: 1.3 {RATIO} (ref 1–2)
ALP LIVER SERPL-CCNC: 77 U/L
ALT SERPL-CCNC: 17 U/L
ANION GAP SERPL CALC-SCNC: 10 MMOL/L (ref 0–18)
AST SERPL-CCNC: 23 U/L (ref ?–34)
BILIRUB SERPL-MCNC: 0.6 MG/DL (ref 0.3–1.2)
BILIRUB UR QL: NEGATIVE
BUN BLD-MCNC: 15 MG/DL (ref 9–23)
BUN/CREAT SERPL: 12 (ref 10–20)
CALCIUM BLD-MCNC: 10.1 MG/DL (ref 8.7–10.4)
CHLORIDE SERPL-SCNC: 103 MMOL/L (ref 98–112)
CHOLEST SERPL-MCNC: 149 MG/DL (ref ?–200)
CLARITY UR: CLEAR
CO2 SERPL-SCNC: 26 MMOL/L (ref 21–32)
COLOR UR: YELLOW
COMPLEXED PSA SERPL-MCNC: 0.83 NG/ML (ref ?–4)
CREAT BLD-MCNC: 1.25 MG/DL
EGFRCR SERPLBLD CKD-EPI 2021: 67 ML/MIN/1.73M2 (ref 60–?)
FASTING PATIENT LIPID ANSWER: NO
FASTING STATUS PATIENT QL REPORTED: NO
GLOBULIN PLAS-MCNC: 3.6 G/DL (ref 2–3.5)
GLUCOSE BLD-MCNC: 73 MG/DL (ref 70–99)
GLUCOSE UR-MCNC: NORMAL MG/DL
HDLC SERPL-MCNC: 42 MG/DL (ref 40–59)
HGB UR QL STRIP.AUTO: NEGATIVE
KETONES UR-MCNC: NEGATIVE MG/DL
LDLC SERPL CALC-MCNC: 99 MG/DL (ref ?–100)
LEUKOCYTE ESTERASE UR QL STRIP.AUTO: 500
NITRITE UR QL STRIP.AUTO: NEGATIVE
NONHDLC SERPL-MCNC: 107 MG/DL (ref ?–130)
OSMOLALITY SERPL CALC.SUM OF ELEC: 287 MOSM/KG (ref 275–295)
PH UR: 5.5 [PH] (ref 5–8)
POTASSIUM SERPL-SCNC: 4.4 MMOL/L (ref 3.5–5.1)
PROT SERPL-MCNC: 8.2 G/DL (ref 5.7–8.2)
SODIUM SERPL-SCNC: 139 MMOL/L (ref 136–145)
SP GR UR STRIP: 1.02 (ref 1–1.03)
TRIGL SERPL-MCNC: 34 MG/DL (ref 30–149)
TSI SER-ACNC: 1.13 UIU/ML (ref 0.55–4.78)
UROBILINOGEN UR STRIP-ACNC: NORMAL
VLDLC SERPL CALC-MCNC: 6 MG/DL (ref 0–30)

## 2024-11-21 PROCEDURE — 84443 ASSAY THYROID STIM HORMONE: CPT

## 2024-11-21 PROCEDURE — 80053 COMPREHEN METABOLIC PANEL: CPT

## 2024-11-21 PROCEDURE — 80061 LIPID PANEL: CPT

## 2024-11-21 PROCEDURE — 36415 COLL VENOUS BLD VENIPUNCTURE: CPT

## 2024-11-21 PROCEDURE — 81001 URINALYSIS AUTO W/SCOPE: CPT

## 2024-11-21 NOTE — PROGRESS NOTES
Subjective:     Patient ID: Niko Mast is a 59 year old male.    59 year old diabetic AA male here for complete preventive care physical and for status update on any confirmed chronic medical illnesses and follow up on any previous labs or procedures that were suggestive or in need of further work up. Colonoscopy is due. Bowel, bladder, and sexual functions are intact.    Patient consents to seasonal influenza vaccine.              History/Other:   Review of Systems  Current Outpatient Medications   Medication Sig Dispense Refill    Tirzepatide (MOUNJARO) 5 MG/0.5ML Subcutaneous Solution Auto-injector Inject 1 each into the skin once a week. 6 mL 1    famotidine (PEPCID) 20 MG Oral Tab Take 1 tablet (20 mg total) by mouth 2 (two) times daily as needed for Heartburn. 30 tablet 0    rivaroxaban (XARELTO) 20 MG Oral Tab Take 1 tablet (20 mg total) by mouth daily with food. 5 tablet 0    latanoprost 0.005 % Ophthalmic Solution INSTILL 1 DROP IN BOTH EYE(S) DAILY AT BEDTIME      Multiple Vitamins-Minerals (MULTI FOR HIM 50+) Oral Tab Take by mouth.      Cholecalciferol (VITAMIN D) 50 MCG (2000 UT) Oral Cap Take by mouth.      pantoprazole 20 MG Oral Tab EC Take 1 tablet (20 mg total) by mouth every morning before breakfast. (Patient not taking: Reported on 11/21/2024) 90 tablet 3    ONETOUCH ULTRA In Vitro Strip USE 1 STRIP EVERY DAY (Patient not taking: Reported on 11/21/2024) 100 strip 1    Tirzepatide (MOUNJARO) 5 MG/0.5ML Subcutaneous Solution Pen-injector Inject 5 mg into the skin once a week. 6 mL 1    Blood Glucose Monitoring Suppl (ONETOUCH ULTRA 2) w/Device Does not apply Kit 1 strip by In Vitro route daily. (Patient not taking: Reported on 11/21/2024) 200 kit 0    OneTouch Delica Lancets 33G Does not apply Misc Test 2 x daily (Patient not taking: Reported on 11/21/2024) 200 each 0     Allergies:Allergies[1]    Past Medical History:    Anxiety    Carotid stenosis    Pulmonary embolism (HCC)    Sleep apnea     CPAP    Sleep apnea, obstructive    mild      Past Surgical History:   Procedure Laterality Date    Colonoscopy      Colonoscopy      Colonoscopy N/A 11/20/2019    Procedure: COLONOSCOPY;  Surgeon: Johann Coelho MD;  Location: [facility] ENDOSCOPY    Shoulder surg proc unlisted Left       Family History   Problem Relation Age of Onset    Cancer Father 72        colon cancer      Social History:   Social History     Socioeconomic History    Marital status:    Tobacco Use    Smoking status: Never     Passive exposure: Never    Smokeless tobacco: Never   Vaping Use    Vaping status: Never Used   Substance and Sexual Activity    Alcohol use: Yes     Alcohol/week: 2.0 standard drinks of alcohol     Types: 2 Glasses of wine per week     Comment: on ocassion     Drug use: No   Other Topics Concern    Caffeine Concern No     Comment: tea    Exercise Yes     Social Drivers of Health     Financial Resource Strain: Low Risk  (5/8/2024)    Financial Resource Strain     Difficulty of Paying Living Expenses: Not very hard     Med Affordability: No   Food Insecurity: No Food Insecurity (5/3/2024)    Food Insecurity     Food Insecurity: Never true   Transportation Needs: No Transportation Needs (5/8/2024)    Transportation Needs     Lack of Transportation: No   Housing Stability: Low Risk  (5/3/2024)    Housing Stability     Housing Instability: No        Objective:   Vitals:    11/21/24 1114   BP: 101/68   Pulse: 79   Resp: 18   Temp: 97.5 °F (36.4 °C)       Physical Exam  Constitutional:       Appearance: Normal appearance.   HENT:      Head: Normocephalic and atraumatic.      Right Ear: Tympanic membrane normal.      Left Ear: Tympanic membrane normal.      Mouth/Throat:      Mouth: Mucous membranes are moist.   Neck:      Thyroid: No thyromegaly.   Cardiovascular:      Rate and Rhythm: Normal rate and regular rhythm.      Heart sounds:      No gallop.   Pulmonary:      Effort: Pulmonary effort is normal.      Breath  sounds: Normal breath sounds.   Abdominal:      Palpations: Abdomen is soft. There is no mass.   Neurological:      Mental Status: He is alert and oriented to person, place, and time.   Psychiatric:         Mood and Affect: Mood normal.         Assessment & Plan:   1. Routine physical examination  Well exam and the following has been ordered.  - Lipid Panel [E]; Future  - TSH [E]; Future  - Urinalysis, Routine [E]; Future  - Comp Metabolic Panel (14) [E]; Future    2. Colon cancer screening  Referred.  - Gastro Referral - [facility] ([place])    3. Influenza vaccine needed  Ordered and administered.  - Fluzone trivalent vaccine, PF 0.5mL, 6mo+ (57513)        Orders Placed This Encounter   Procedures    Lipid Panel [E]    TSH [E]    Urinalysis, Routine [E]    Comp Metabolic Panel (14) [E]    Fluzone trivalent vaccine, PF 0.5mL, 6mo+ ([id number])       Meds This Visit:  Requested Prescriptions      No prescriptions requested or ordered in this encounter       Imaging & Referrals:  INFLUENZA VACCINE, TRI, PRESERV FREE, 0.5 ML  GASTRO - INTERNAL     Patient Instructions   All adult screening ordered and done appropriate for patient's age and gender and risk factors and complaints.  Monitor blood pressures and record at home. Limit salt intake.  Encouraged physical fitness and daily physical activity daily.  Medication reviewed and renewed where needed and appropriate.  Comply with medications.    Return in about 1 year (around 11/21/2025), or if symptoms worsen or fail to improve.         [1] No Known Allergies

## 2024-11-21 NOTE — PATIENT INSTRUCTIONS
All adult screening ordered and done appropriate for patient's age and gender and risk factors and complaints.  Monitor blood pressures and record at home. Limit salt intake.  Encouraged physical fitness and daily physical activity daily.  Medication reviewed and renewed where needed and appropriate.  Comply with medications.

## 2024-11-22 ENCOUNTER — TELEPHONE (OUTPATIENT)
Dept: FAMILY MEDICINE CLINIC | Facility: CLINIC | Age: 59
End: 2024-11-22

## 2024-11-22 NOTE — TELEPHONE ENCOUNTER
Spoke to patient, full name and date of birth verified.  Patient is having trouble getting into his [employer].   RN informed patient that Dr. Means has not yet reviewed his labs, we will call him back with the doctor's recommendations.     Patient was driving, could not take down a phone number.   RN recommended trying to re-set his password to see if he can get back into his [employer] account.

## 2024-11-23 NOTE — TELEPHONE ENCOUNTER
Patient's labs have been resulted and sent via triage.  Please see my note and inform the patient of his results.  Thank you.

## 2024-11-25 ENCOUNTER — OFFICE VISIT (OUTPATIENT)
Dept: PULMONOLOGY | Facility: CLINIC | Age: 59
End: 2024-11-25

## 2024-11-25 VITALS
SYSTOLIC BLOOD PRESSURE: 133 MMHG | BODY MASS INDEX: 29.01 KG/M2 | WEIGHT: 207.19 LBS | OXYGEN SATURATION: 98 % | RESPIRATION RATE: 18 BRPM | DIASTOLIC BLOOD PRESSURE: 84 MMHG | HEIGHT: 71 IN | HEART RATE: 93 BPM

## 2024-11-25 DIAGNOSIS — I26.92 ACUTE SADDLE PULMONARY EMBOLISM, UNSPECIFIED WHETHER ACUTE COR PULMONALE PRESENT (HCC): Primary | ICD-10-CM

## 2024-11-25 PROCEDURE — 3008F BODY MASS INDEX DOCD: CPT | Performed by: INTERNAL MEDICINE

## 2024-11-25 PROCEDURE — 3079F DIAST BP 80-89 MM HG: CPT | Performed by: INTERNAL MEDICINE

## 2024-11-25 PROCEDURE — 3075F SYST BP GE 130 - 139MM HG: CPT | Performed by: INTERNAL MEDICINE

## 2024-11-25 PROCEDURE — 99213 OFFICE O/P EST LOW 20 MIN: CPT | Performed by: INTERNAL MEDICINE

## 2024-11-25 NOTE — PROGRESS NOTES
The patient is a 59-year-old male who I know well from prior evaluation comes in now for follow-up.  He had a saddle pulmonary embolism and underwent thrombectomy.  He is now finishing up 6 months of full dose Xarelto.  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    Review of Systems:  Vision normal. Ear nose and throat normal. Bowel normal. Bladder function normal. No depression. No thyroid disease. No lymphatic system concerns.  No rash. Muscles and joints unremarkable. No weight loss no weight gain.    Physical Examination:  Vital signs normal. HEENT examination is unremarkable with pupils equal round and reactive to light and accommodation. Neck without adenopathy, thyromegaly, JVD nor bruit. Lungs clear to auscultation and percussion. Cardiac regular rate and rhythm no murmur. Abdomen nontender, without hepatosplenomegaly and no mass appreciable. Extremities and Musculoskeletal without clubbing cyanosis nor edema, and mobility acceptable. Neurologic grossly intact with symmetric tone and strength and reflex.    Assessment and plan:  1.  Saddle pulmonary embolism-doing well clinically now.    Recommendations: Decrease dosing of Xarelto to 10 mg indefinitely.  See me in the office at 6-month interval or sooner if needed and contact me promptly if new trouble.    2.  Obstructive sleep apnea-I encouraged the patient to be vigilant with CPAP every night all night.

## 2024-11-25 NOTE — TELEPHONE ENCOUNTER
Dawson Means,   11/23/2024  8:42 AM CST Back to Top      Please let the patient know that his labs have been reviewed.  His liver and kidney functions are normal.  His blood sugar for the day was excellent.  His proteins and electrolytes are normal.  He is not anemic.  His thyroid screen is normal.  His prostate cancer screening is normal.  His urinalysis is suggestive of UTI.  Please ask if he is experiencing any urinary symptoms including urinary frequency or discomfort when he urinates.       Called patient, no answer.     Left message to call back and speak with a nurse.

## 2024-12-04 ENCOUNTER — TELEPHONE (OUTPATIENT)
Dept: PULMONOLOGY | Facility: CLINIC | Age: 59
End: 2024-12-04

## 2024-12-04 NOTE — TELEPHONE ENCOUNTER
Patient is requesting to speak with MD.  He needs to discuss restrictions regarding returning to work.  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  Please call

## 2024-12-04 NOTE — TELEPHONE ENCOUNTER
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

## 2024-12-05 NOTE — TELEPHONE ENCOUNTER
I spoke to the patient.  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

## 2024-12-11 ENCOUNTER — TELEPHONE (OUTPATIENT)
Dept: FAMILY MEDICINE CLINIC | Facility: CLINIC | Age: 59
End: 2024-12-11

## 2024-12-11 ENCOUNTER — OFFICE VISIT (OUTPATIENT)
Dept: ENDOCRINOLOGY CLINIC | Facility: CLINIC | Age: 59
End: 2024-12-11
Payer: COMMERCIAL

## 2024-12-11 VITALS
WEIGHT: 211 LBS | HEIGHT: 71 IN | SYSTOLIC BLOOD PRESSURE: 116 MMHG | BODY MASS INDEX: 29.54 KG/M2 | HEART RATE: 107 BPM | DIASTOLIC BLOOD PRESSURE: 75 MMHG

## 2024-12-11 DIAGNOSIS — E11.65 UNCONTROLLED TYPE 2 DIABETES MELLITUS WITH HYPERGLYCEMIA (HCC): Primary | ICD-10-CM

## 2024-12-11 LAB
GLUCOSE BLOOD: 158
HEMOGLOBIN A1C: 5.6 % (ref 4.3–5.6)
TEST STRIP LOT #: NORMAL NUMERIC

## 2024-12-11 PROCEDURE — 83036 HEMOGLOBIN GLYCOSYLATED A1C: CPT

## 2024-12-11 PROCEDURE — 99214 OFFICE O/P EST MOD 30 MIN: CPT

## 2024-12-11 PROCEDURE — 3074F SYST BP LT 130 MM HG: CPT

## 2024-12-11 PROCEDURE — 82947 ASSAY GLUCOSE BLOOD QUANT: CPT

## 2024-12-11 PROCEDURE — 3008F BODY MASS INDEX DOCD: CPT

## 2024-12-11 PROCEDURE — 3078F DIAST BP <80 MM HG: CPT

## 2024-12-11 RX ORDER — LANCETS 33 GAUGE
EACH MISCELLANEOUS
Qty: 200 EACH | Refills: 1 | Status: SHIPPED | OUTPATIENT
Start: 2024-12-11

## 2024-12-11 NOTE — TELEPHONE ENCOUNTER
Patient called to schedule a Pre-Op, DOS is '3rd or 4th week of January\", next available is in February.

## 2024-12-11 NOTE — PROGRESS NOTES
Name: Niko Mast  Date: 12/11/24    Referring Physician: No ref. provider found    HISTORY OF PRESENT ILLNESS   Niko Mast is a 59 year old male who presents for follow up for diabetes mellitus     Diabetes History:  Diagnosed- new diagnosis - found during preoperative work up for hip surgery a within the last 6 months     DM FH  - mother   - cousins and uncles on mom's side     Prior glycohemoglobin were: 7.5% 1/2024;  5.6% 5/2024; 6.1% 9/2024; 5.6% POC today     Glucose in clinic today - 158 mg/dl     Dietary compliance: Good- he has made dietary changes since diagnosis and is following low CHO diet- avoiding breads.      Recall:     Breakfast- egg whites and oatmeal   Lunch- salads   Dinner- fish or chicken with vegetable, fruits   Snack- grapes   Beverages- water was drinking coke zero before.     Exercise: Yes- has decreased to 1-2 times weekly since holidays but planning to increase.     Polyuria/polydipsia: No  Blurred vision: No    Episodes of hypoglycemia: No  Blood Glucose:    Fasting - 's     Current DM Regimen:  Mounjaro 5 mg subcutaneous weekly     Modifying factors:  Medication adherence: Yes   Recent steroids, illness or infections: No     REVIEW OF SYSTEMS  Eyes: Diabetic retinopathy present: No, does have recent glaucoma diagnosis             Most recent visit to eye doctor in last 12 months: Yes- 12/2024- Dr. Sotelo with [facility]     CV: Cardiovascular disease present: No         Hypertension present: No         Hyperlipidemia present: Yes         Peripheral Vascular Disease present: No    : Nephropathy present: None known     Neuro: Neuropathy present: None     Skin: Infection or ulceration: No    Osteoporosis: No    Thyroid disease: No      Medications:     Current Outpatient Medications:     OneTouch Delica Lancets 33G Does not apply Misc, 4 x daily, Disp: 200 each, Rfl: 1    Tirzepatide (MOUNJARO) 5 MG/0.5ML Subcutaneous Solution Auto-injector, Inject 1 each into the skin once a  week., Disp: 6 mL, Rfl: 1    Tirzepatide (MOUNJARO) 5 MG/0.5ML Subcutaneous Solution Pen-injector, Inject 5 mg into the skin once a week., Disp: 6 mL, Rfl: 1    rivaroxaban (XARELTO) 10 MG Oral Tab, Take 1 tablet (10 mg total) by mouth daily with food., Disp: 90 tablet, Rfl: 3    rivaroxaban (XARELTO) 20 MG Oral Tab, Take 1 tablet (20 mg total) by mouth daily with food., Disp: 5 tablet, Rfl: 0    pantoprazole 20 MG Oral Tab EC, Take 1 tablet (20 mg total) by mouth every morning before breakfast. (Patient not taking: Reported on 11/25/2024), Disp: 90 tablet, Rfl: 3    ONETOUCH ULTRA In Vitro Strip, USE 1 STRIP EVERY DAY (Patient not taking: Reported on 11/25/2024), Disp: 100 strip, Rfl: 1    latanoprost 0.005 % Ophthalmic Solution, INSTILL 1 DROP IN BOTH EYE(S) DAILY AT BEDTIME, Disp: , Rfl:     Blood Glucose Monitoring Suppl (ONETOUCH ULTRA 2) w/Device Does not apply Kit, 1 strip by In Vitro route daily., Disp: 200 kit, Rfl: 0    OneTouch Delica Lancets 33G Does not apply Misc, Test 2 x daily (Patient not taking: Reported on 11/25/2024), Disp: 200 each, Rfl: 0    Multiple Vitamins-Minerals (MULTI FOR HIM 50+) Oral Tab, Take by mouth., Disp: , Rfl:     Cholecalciferol (VITAMIN D) 50 MCG (2000 UT) Oral Cap, Take by mouth., Disp: , Rfl:      Allergies:   No Known Allergies    Social History:   Social History     Socioeconomic History    Marital status: Single   Tobacco Use    Smoking status: Never     Passive exposure: Never    Smokeless tobacco: Never   Vaping Use    Vaping status: Never Used   Substance and Sexual Activity    Alcohol use: Yes     Alcohol/week: 2.0 standard drinks of alcohol     Types: 2 Glasses of wine per week     Comment: on ocassion     Drug use: No   Other Topics Concern    Caffeine Concern No     Comment: tea    Exercise Yes       Medical History:   Past Medical History:    Anxiety    Carotid stenosis    Pulmonary embolism (HCC)    Sleep apnea    CPAP    Sleep apnea, obstructive    mild        Surgical history:   Past Surgical History:   Procedure Laterality Date    Colonoscopy      Colonoscopy      Colonoscopy N/A 11/20/2019    Procedure: COLONOSCOPY;  Surgeon: Johann Coelho MD;  Location: [facility] ENDOSCOPY    Shoulder surg proc unlisted Left          PHYSICAL EXAM  Vitals:    12/11/24 1428   BP: 116/75   Pulse: 107   Weight: 211 lb (95.7 kg)   Height: 5' 11\" (1.803 m)         General Appearance:  alert, well developed, in no acute distress  Eyes:  normal conjunctivae, sclera, and normal pupils  Neck: Trachea midline: Normal  Back: no kyphosis or back tenderness  Musculoskeletal:  normal muscle strength and tone  Skin:  normal moisture and skin texture  Hair & Nails:  normal scalp hair     Hematologic:  no excessive bruising  Neuro:  sensory grossly intact and motor grossly intact.  Psychiatric:  oriented to time, self, and place  Nutritional:  no abnormal weight gain or loss      ASSESSMENT/PLAN:    Diabetes mellitus type 2 controlled   -HgA1c- 5.6% POC today   -Congratulated patient on improved glycemic control  -Reviewed  diagnosis of diabetes   -Reviewed ABC's of diabetes   - Reviewed pathogenesis of diabetes.   - Reviewed importance of good glycemic control to prevent microvascular and macrovascular complications including nephropathy, neuropathy, retinopathy, and cardiovascular disease.  - Reviewed importance of SBGM- check glucose 1 time daily   - Reviewed target glucose goals for patient  fasting and <180 (ideally <160) post prandially   - Reviewed importance of following diabetic diet- recommended 135 grams of CHO per day or 45 grams per meal.   - Provided patient education materials  - Reviewed importance of SBGM- continue to check glucose twice daily      - Continue Mounjaro 5 mg subcutaneous weekly. Reviewed side effects and risks vs benefits of medication.     - Reviewed importance of yearly optho follow up. Now UTD with optho- last visit 12/2024 new glaucoma diagnosis but no  retinopathy   - Lipids 11/2024- at goal   - No nephropathy    - Has been following with podiatry- last visit and foot exam 11/2024- needs to undergo foot surgery. Clearance from endocrine standpoint for his diabetes provided.     RTC in 6 months   12/11/24   PRANAY Aquino    A total of  30 minutes was spent on obtaining history, reviewing pertinent imaging/labs and specialists notes, evaluating patient, providing multiple treatment options, reinforcing diet/exercise and compliance, and completing documentation.        Dr. Harriett Conti - podiatrist

## 2024-12-18 ENCOUNTER — LAB ENCOUNTER (OUTPATIENT)
Dept: LAB | Age: 59
End: 2024-12-18
Attending: FAMILY MEDICINE
Payer: COMMERCIAL

## 2024-12-18 DIAGNOSIS — R82.90 ABNORMAL URINALYSIS: ICD-10-CM

## 2024-12-18 DIAGNOSIS — R30.0 DYSURIA: ICD-10-CM

## 2024-12-18 LAB
BILIRUB UR QL: NEGATIVE
CLARITY UR: CLEAR
GLUCOSE UR-MCNC: NORMAL MG/DL
HGB UR QL STRIP.AUTO: NEGATIVE
KETONES UR-MCNC: NEGATIVE MG/DL
LEUKOCYTE ESTERASE UR QL STRIP.AUTO: 75
NITRITE UR QL STRIP.AUTO: NEGATIVE
PH UR: 5.5 [PH] (ref 5–8)
PROT UR-MCNC: NEGATIVE MG/DL
SP GR UR STRIP: 1.02 (ref 1–1.03)
UROBILINOGEN UR STRIP-ACNC: NORMAL

## 2024-12-18 PROCEDURE — 87086 URINE CULTURE/COLONY COUNT: CPT

## 2024-12-18 PROCEDURE — 81001 URINALYSIS AUTO W/SCOPE: CPT

## 2024-12-31 ENCOUNTER — OFFICE VISIT (OUTPATIENT)
Dept: FAMILY MEDICINE CLINIC | Facility: CLINIC | Age: 59
End: 2024-12-31

## 2024-12-31 VITALS
SYSTOLIC BLOOD PRESSURE: 126 MMHG | TEMPERATURE: 98 F | RESPIRATION RATE: 18 BRPM | DIASTOLIC BLOOD PRESSURE: 83 MMHG | OXYGEN SATURATION: 97 % | HEART RATE: 79 BPM | BODY MASS INDEX: 30 KG/M2 | WEIGHT: 217 LBS

## 2024-12-31 DIAGNOSIS — M21.072 VALGUS DEFORMITY OF FOOT, LEFT: ICD-10-CM

## 2024-12-31 DIAGNOSIS — Z01.818 PRE-OP EXAMINATION: Primary | ICD-10-CM

## 2024-12-31 DIAGNOSIS — Z01.812 ENCOUNTER FOR PRE-OPERATIVE LABORATORY TESTING: ICD-10-CM

## 2024-12-31 DIAGNOSIS — Z86.711 HISTORY OF PULMONARY EMBOLISM: ICD-10-CM

## 2024-12-31 DIAGNOSIS — M21.612 BUNION OF GREAT TOE OF LEFT FOOT: ICD-10-CM

## 2024-12-31 DIAGNOSIS — E11.9 TYPE 2 DIABETES MELLITUS WITHOUT COMPLICATION, WITHOUT LONG-TERM CURRENT USE OF INSULIN (HCC): ICD-10-CM

## 2024-12-31 NOTE — PATIENT INSTRUCTIONS
Preoperative tests have been ordered.  Medical clearance pending results of these tests.  Comply with medication regarding all chronic medical conditions.

## 2024-12-31 NOTE — PROGRESS NOTES
Subjective:     Patient ID: Niko Mast is a 59 year old male.    This patient is a well-established 59-year-old -American gentleman with history of DVT and well-controlled diabetes who was initially here for preoperative testing and for medical clearance for upcoming left foot bunion related surgery tentatively scheduled for January 23, 2025.  Patient does have reasonable concern to delay definitive surgery for now.  The patient has made some adjustments which would allow him to maintain a certain level of functionality without proceeding to surgery.    Patient is due for colonoscopy and has been encouraged to continue with that plan.    Deformity of left foot has not changed.  Status and circumstance regarding the left foot has not changed.    General note to the patient's foot specialist will be prepared and hopefully if surgery is still a necessity, the patient accommodated appropriately.        History/Other:   Review of Systems  Current Outpatient Medications   Medication Sig Dispense Refill    OneTouch Delica Lancets 33G Does not apply Misc 4 x daily 200 each 1    rivaroxaban (XARELTO) 10 MG Oral Tab Take 1 tablet (10 mg total) by mouth daily with food. 90 tablet 3    Tirzepatide (MOUNJARO) 5 MG/0.5ML Subcutaneous Solution Auto-injector Inject 1 each into the skin once a week. 6 mL 1    rivaroxaban (XARELTO) 20 MG Oral Tab Take 1 tablet (20 mg total) by mouth daily with food. 5 tablet 0    pantoprazole 20 MG Oral Tab EC Take 1 tablet (20 mg total) by mouth every morning before breakfast. (Patient not taking: Reported on 11/25/2024) 90 tablet 3    ONETOUCH ULTRA In Vitro Strip USE 1 STRIP EVERY DAY (Patient not taking: Reported on 11/25/2024) 100 strip 1    Tirzepatide (MOUNJARO) 5 MG/0.5ML Subcutaneous Solution Pen-injector Inject 5 mg into the skin once a week. 6 mL 1    latanoprost 0.005 % Ophthalmic Solution INSTILL 1 DROP IN BOTH EYE(S) DAILY AT BEDTIME      Blood Glucose Monitoring Suppl  (ONETOUCH ULTRA 2) w/Device Does not apply Kit 1 strip by In Vitro route daily. 200 kit 0    OneTouch Delica Lancets 33G Does not apply Misc Test 2 x daily (Patient not taking: Reported on 11/25/2024) 200 each 0    Multiple Vitamins-Minerals (MULTI FOR HIM 50+) Oral Tab Take by mouth.      Cholecalciferol (VITAMIN D) 50 MCG (2000 UT) Oral Cap Take by mouth.       Allergies:Allergies[1]    Past Medical History:    Anxiety    Carotid stenosis    Pulmonary embolism (HCC)    Sleep apnea    CPAP    Sleep apnea, obstructive    mild      Past Surgical History:   Procedure Laterality Date    Colonoscopy      Colonoscopy      Colonoscopy N/A 11/20/2019    Procedure: COLONOSCOPY;  Surgeon: Johann Coelho MD;  [facility] ENDOSCOPY    Shoulder surg proc unlisted Left       Family History   Problem Relation Age of Onset    Cancer Father 72        colon cancer      Social History:   Social History     Socioeconomic History    Marital status: Single   Tobacco Use    Smoking status: Never     Passive exposure: Never    Smokeless tobacco: Never   Vaping Use    Vaping status: Never Used   Substance and Sexual Activity    Alcohol use: Yes     Alcohol/week: 2.0 standard drinks of alcohol     Types: 2 Glasses of wine per week     Comment: on ocassion     Drug use: No   Other Topics Concern    Caffeine Concern No     Comment: tea    Exercise Yes     Social Drivers of Health     Financial Resource Strain: Low Risk  (5/8/2024)    Financial Resource Strain     Difficulty of Paying Living Expenses: Not very hard     Med Affordability: No   Food Insecurity: Low Risk  (12/9/2024)    Received from [facility]    Food Insecurity     Have there been times that your food ran out, and you didn't have money to get more?: No     Are there times that you worry that this might happen?: No   Transportation Needs: Low Risk  (12/9/2024)    Received from [facility]    Transportation Needs     Do you have  trouble getting transportation to medical appointments?: No        Objective:   Vitals:    12/31/24 1117   BP: 126/83   Pulse: 79   Resp: 18   Temp: 97.7 °F (36.5 °C)       Physical Exam  Constitutional:       General: He is not in acute distress.     Appearance: Normal appearance. He is not ill-appearing.   HENT:      Head: Normocephalic and atraumatic.      Right Ear: Tympanic membrane normal.      Left Ear: Tympanic membrane normal.      Nose: Nose normal.      Mouth/Throat:      Mouth: Mucous membranes are moist.   Neck:      Thyroid: No thyromegaly.   Cardiovascular:      Rate and Rhythm: Normal rate and regular rhythm.      Heart sounds: Normal heart sounds.   Pulmonary:      Effort: Pulmonary effort is normal. No respiratory distress.      Breath sounds: Normal breath sounds.   Abdominal:      Palpations: Abdomen is soft. There is no mass.   Musculoskeletal:      Left foot: Deformity and bunion present.   Neurological:      Mental Status: He is alert and oriented to person, place, and time.   Psychiatric:         Mood and Affect: Mood normal.         Assessment & Plan:   1. Pre-op examination  Patient has decided to postpone surgery involving the left foot.  See #3.    2. Encounter for pre-operative laboratory testing  Preoperative labs and testing to be placed on hold for now.  - CBC W Differential W Platelet [E]; Future  - Comp Metabolic Panel (14) [E]; Future  - EKG In-Office [43714]  - XR CHEST PA + LAT CHEST (CPT=71046); Future    3. Valgus deformity of foot, left  Continue with conservative treatment approach for now.    4. Bunion of great toe of left foot  Secondary to #3.    5. Type 2 diabetes mellitus without complication, without long-term current use of insulin (HCC)  Good control.  Latest A1c at 5.6 done on December 11, 2024.    6. History of pulmonary embolism  Currently asymptomatic.  Currently anticoagulated.      Orders Placed This Encounter   Procedures    CBC W Differential W Platelet [E]     Comp Metabolic Panel (14) [E]       Meds This Visit:  Requested Prescriptions      No prescriptions requested or ordered in this encounter       Imaging & Referrals:  ELECTROCARDIOGRAM, COMPLETE  XR CHEST PA + LAT CHEST (FPA=[id number])     Patient Instructions   Preoperative tests have been ordered.  Medical clearance pending results of these tests.  Comply with medication regarding all chronic medical conditions.  Return in about 6 weeks (around 2/11/2025), or if symptoms worsen or fail to improve.    Return in about 6 weeks (around 2/11/2025), or if symptoms worsen or fail to improve.         [1] No Known Allergies

## 2025-01-02 ENCOUNTER — TELEPHONE (OUTPATIENT)
Facility: CLINIC | Age: 60
End: 2025-01-02

## 2025-01-02 NOTE — TELEPHONE ENCOUNTER
Dr. Ellison    May patient hold Rivaroxaban for 2 days prior to colonoscopy with Dr. Coelho on 1/9/2025 from your standpoint?    Thank you  [facility] Clinical Staff

## 2025-01-02 NOTE — TELEPHONE ENCOUNTER
Last office visit 11/25/24 with diagnosis- acute saddle pulmonary embolism.  Xarelto 10 mg oral tab daily with food per last script 11/25/24.  Dr. Ellison- pls see below message.

## 2025-01-02 NOTE — TELEPHONE ENCOUNTER
Gemini Cruz Rn from [facility] states patient is on xeralto and wondering when to stop taking it before the procedure please call patient     [phone]

## 2025-01-03 ENCOUNTER — TELEPHONE (OUTPATIENT)
Dept: FAMILY MEDICINE CLINIC | Facility: CLINIC | Age: 60
End: 2025-01-03

## 2025-01-03 NOTE — TELEPHONE ENCOUNTER
Per , patient will come tomorrow and  letter.   will print letter and have it available for him.

## 2025-01-03 NOTE — TELEPHONE ENCOUNTER
Patient walked into office due to dates being incorrect on previous letter (2024 instead of 2025).

## 2025-01-07 ENCOUNTER — TELEPHONE (OUTPATIENT)
Facility: CLINIC | Age: 60
End: 2025-01-07

## 2025-01-07 NOTE — TELEPHONE ENCOUNTER
Patient contacted and prep instructions reviewed with patient.  Patient states he does not use MyChart.  States he ate popcorn last night and took a multivitamin yesterday by mistake.  Okay to proceed with colonoscopy on Thursday?  Please advise. Thank you

## 2025-01-09 ENCOUNTER — ANESTHESIA EVENT (OUTPATIENT)
Dept: ENDOSCOPY | Facility: HOSPITAL | Age: 60
End: 2025-01-09
Payer: COMMERCIAL

## 2025-01-09 ENCOUNTER — HOSPITAL ENCOUNTER (OUTPATIENT)
Facility: HOSPITAL | Age: 60
Setting detail: HOSPITAL OUTPATIENT SURGERY
Discharge: HOME | End: 2025-01-09
Attending: INTERNAL MEDICINE | Admitting: INTERNAL MEDICINE
Payer: COMMERCIAL

## 2025-01-09 ENCOUNTER — ANESTHESIA (OUTPATIENT)
Dept: ENDOSCOPY | Facility: HOSPITAL | Age: 60
End: 2025-01-09
Payer: COMMERCIAL

## 2025-01-09 VITALS
TEMPERATURE: 97 F | SYSTOLIC BLOOD PRESSURE: 117 MMHG | HEIGHT: 71 IN | DIASTOLIC BLOOD PRESSURE: 81 MMHG | WEIGHT: 206 LBS | HEART RATE: 67 BPM | OXYGEN SATURATION: 99 % | BODY MASS INDEX: 28.84 KG/M2 | RESPIRATION RATE: 17 BRPM

## 2025-01-09 DIAGNOSIS — Z86.0100 PERSONAL HISTORY OF COLONIC POLYPS: ICD-10-CM

## 2025-01-09 DIAGNOSIS — Z86.0100 HISTORY OF COLONIC POLYPS: ICD-10-CM

## 2025-01-09 LAB — GLUCOSE BLDC GLUCOMTR-MCNC: 91 MG/DL (ref 70–99)

## 2025-01-09 PROCEDURE — 45385 COLONOSCOPY W/LESION REMOVAL: CPT | Performed by: INTERNAL MEDICINE

## 2025-01-09 PROCEDURE — 0DBK8ZZ EXCISION OF ASCENDING COLON, VIA NATURAL OR ARTIFICIAL OPENING ENDOSCOPIC: ICD-10-PCS | Performed by: INTERNAL MEDICINE

## 2025-01-09 PROCEDURE — 45380 COLONOSCOPY AND BIOPSY: CPT | Performed by: INTERNAL MEDICINE

## 2025-01-09 PROCEDURE — 0DBP8ZZ EXCISION OF RECTUM, VIA NATURAL OR ARTIFICIAL OPENING ENDOSCOPIC: ICD-10-PCS | Performed by: INTERNAL MEDICINE

## 2025-01-09 PROCEDURE — 0DBH8ZZ EXCISION OF CECUM, VIA NATURAL OR ARTIFICIAL OPENING ENDOSCOPIC: ICD-10-PCS | Performed by: INTERNAL MEDICINE

## 2025-01-09 PROCEDURE — 0DBM8ZZ EXCISION OF DESCENDING COLON, VIA NATURAL OR ARTIFICIAL OPENING ENDOSCOPIC: ICD-10-PCS | Performed by: INTERNAL MEDICINE

## 2025-01-09 RX ORDER — LIDOCAINE HYDROCHLORIDE 10 MG/ML
INJECTION, SOLUTION EPIDURAL; INFILTRATION; INTRACAUDAL; PERINEURAL AS NEEDED
Status: DISCONTINUED | OUTPATIENT
Start: 2025-01-09 | End: 2025-01-09 | Stop reason: SURG

## 2025-01-09 RX ORDER — SODIUM CHLORIDE, SODIUM LACTATE, POTASSIUM CHLORIDE, CALCIUM CHLORIDE 600; 310; 30; 20 MG/100ML; MG/100ML; MG/100ML; MG/100ML
INJECTION, SOLUTION INTRAVENOUS CONTINUOUS
Status: DISCONTINUED | OUTPATIENT
Start: 2025-01-09 | End: 2025-01-09

## 2025-01-09 RX ADMIN — LIDOCAINE HYDROCHLORIDE 50 MG: 10 INJECTION, SOLUTION EPIDURAL; INFILTRATION; INTRACAUDAL; PERINEURAL at 13:14:00

## 2025-01-09 RX ADMIN — LIDOCAINE HYDROCHLORIDE 50 MG: 10 INJECTION, SOLUTION EPIDURAL; INFILTRATION; INTRACAUDAL; PERINEURAL at 13:07:00

## 2025-01-09 RX ADMIN — SODIUM CHLORIDE, SODIUM LACTATE, POTASSIUM CHLORIDE, CALCIUM CHLORIDE: 600; 310; 30; 20 INJECTION, SOLUTION INTRAVENOUS at 13:15:00

## 2025-01-09 NOTE — H&P
History & Physical Examination    Patient Name: Niko Mast  MRN: [id number]  CSN: [id number]  YOB: 1965    Diagnosis:   CRC screening  Hx colon polyps       Prescriptions Prior to Admission[1]  Current Facility-Administered Medications   Medication Dose Route Frequency    lactated ringers infusion   Intravenous Continuous       Allergies: Allergies[2]    Past Medical History:    Carotid stenosis    Deep vein thrombosis (HCC)    Diabetes (HCC)    Pulmonary embolism (HCC)    Sleep apnea    CPAP    Sleep apnea, obstructive    mild     Past Surgical History:   Procedure Laterality Date    Colonoscopy      Colonoscopy      Colonoscopy N/A 11/20/2019    Procedure: COLONOSCOPY;  Surgeon: Johann Coelho MD;  Location: [facility] ENDOSCOPY    Shoulder surg proc unlisted Left      Family History   Problem Relation Age of Onset    Cancer Father 72        colon cancer     Social History     Tobacco Use    Smoking status: Never     Passive exposure: Never    Smokeless tobacco: Never   Substance Use Topics    Alcohol use: Yes     Alcohol/week: 2.0 standard drinks of alcohol     Types: 2 Glasses of wine per week     Comment: occasionally       SYSTEM Check if Review is Normal Check if Physical Exam is Normal If not normal, please explain:   HEENT [x ] [ x]    NECK & BACK [x ] [x ]    HEART [x ] [ x]    LUNGS [x ] [ x]    ABDOMEN [x ] [x ]    UROGENITAL [ ] [ ]    EXTREMITIES [x ] [x ]    OTHER        [ x ] I have discussed the risks and benefits and alternatives with the patient/family.  They understand and agree to proceed with plan of care.  [ x ] I have reviewed the History and Physical done within the last 30 days.  Any changes noted above.    Johann Coelho MD  1/9/2025  1:00 PM         [1]   Medications Prior to Admission   Medication Sig Dispense Refill Last Dose/Taking    rivaroxaban (XARELTO) 10 MG Oral Tab Take 1 tablet (10 mg total) by mouth daily with food. 90 tablet 3 1/6/2025    Tirzepatide  (MOUNJARO) 5 MG/0.5ML Subcutaneous Solution Pen-injector Inject 5 mg into the skin once a week. (Patient taking differently: Inject 5 mg into the skin once a week. Every Wednesday) 6 mL 1 2025    latanoprost 0.005 % Ophthalmic Solution INSTILL 1 DROP IN BOTH EYE(S) DAILY AT BEDTIME   Taking    Multiple Vitamins-Minerals (MULTI FOR HIM 50+) Oral Tab Take by mouth.   1/3/2025    Cholecalciferol (VITAMIN D) 50 MCG ( UT) Oral Cap Take by mouth.   1/3/2025    OneTouch Delica Lancets 33G Does not apply Misc 4 x daily 200 each 1     [] ciprofloxacin 250 MG Oral Tab Take 1 tablet (250 mg total) by mouth 2 (two) times daily for 7 days. 14 tablet 0     ONETOUCH ULTRA In Vitro Strip USE 1 STRIP EVERY DAY (Patient not taking: Reported on 2024) 100 strip 1     Blood Glucose Monitoring Suppl (ONETOUCH ULTRA 2) w/Device Does not apply Kit 1 strip by In Vitro route daily. 200 kit 0     OneTouch Delica Lancets 33G Does not apply Misc Test 2 x daily (Patient not taking: Reported on 2024) 200 each 0    [2] No Known Allergies

## 2025-01-09 NOTE — OPERATIVE REPORT
[facility] Endoscopy Report  Date of procedure-January 9, 2025    Preoperative Diagnosis:  -Colorectal cancer screening  -History colon polyps    Postoperative Diagnosis:  -Colon polyps x 4   -Internal hemorrhoids    Procedure:    Colonoscopy     Surgeon:  Johann Coelho M.D.    Anesthesia:  MAC     Technique:  After informed consent, the patient was placed in the left lateral recumbent position.  Digital rectal examination revealed no palpable intraluminal abnormalities.  An Olympus variable stiffness 190 series HD colonoscope was inserted into the rectum and advanced under direct vision by following the lumen to the cecum.  The colon was examined upon withdrawal in the left lateral position.  The procedure was well tolerated without immediate complication.      Findings:  The preparation of the colon was good.  The terminal ileum was examined for 4 cm and visually normal.  The ileocecal valve was well preserved. The visualized colonic mucosa from the cecum to the anal verge was normal with an intact vascular pattern.    Colon polyps x 4 removed as follows;  -Cecum x 1, sessile 4 mm in size and cold snare removed.  -Ascending x 1, diminutive removed by cold forceps technique.  -Descending x 1, diminutive removed by cold forceps technique.  -Rectum x 1, diminutive removed by cold forceps technique.  All polypectomy sites inspected and found to be free bleeding specimens retrieved and sent for analysis.    Small internal hemorrhoids noted on retroflexed view.    Estimated blood loss-insignificant  Specimens-see above    Impression:  -Colon polyps x 4   -Internal hemorrhoids    Recommendations:  - Post polypectomy instructions given  - Repeat colonoscopy in 3- 5 years  - Symptomatic treatment of hemorrhoids          Johann Coelho MD  1/9/2025  1:35 PM

## 2025-01-09 NOTE — DISCHARGE INSTRUCTIONS
Home Care Instructions for Colonoscopy with Sedation    Diet:  - Resume your regular diet as tolerated unless otherwise instructed.  - Start with light meals to minimize bloating.  - Do not drink alcohol today.    Medication:  - If you have questions about resuming your normal medications, please contact your Primary Care Physician.    Activities:  - Take it easy today. Do not return to work today.  - Do not drive today.  - Do not operate any machinery today (including kitchen equipment).    Colonoscopy:  - You may notice some rectal \"spotting\" (a little blood on the toilet tissue) for a day or two after the exam. This is normal.  - If you experience any rectal bleeding (not spotting), persistent tenderness or sharp severe abdominal pains, oral temperature over 100 degrees Fahrenheit, light-headedness or dizziness, or any other problems, contact your doctor.    **If unable to reach your doctor, please go to the [facility] Emergency Room**    - Your referring physician will receive a full report of your examination.  - If you do not hear from your doctor's office within two weeks of your biopsy, please call them for your results.    You may be able to see your laboratory results in [facility] between 4 and 7 business days.  In some cases, your physician may not have viewed the results before they are released to [facility].  If you have questions regarding your results contact the physician who ordered the test/exam by phone or via [facility] by choosing \"Ask a Medical Question.\"

## 2025-01-09 NOTE — ANESTHESIA PREPROCEDURE EVALUATION
Anesthesia PreOp Note    HPI:     Niko Mast is a 59 year old male who presents for preoperative consultation requested by: Johann Coelho MD    Date of Surgery: 11/20/2019    Procedure(s):  COLONOSCOPY  Indication: History of colon polyps, Family history of colon cancer    Relevant Problems   No relevant active problems       NPO:  Last Liquid Consumption Date: 11/20/19  Last Liquid Consumption Time: 1100  Last Solid Consumption Date: 11/18/19  Last Solid Consumption Time: 2100  NPO: Yes  -No data recorded    History Review:  Patient Active Problem List    Diagnosis Date Noted    Acute deep vein thrombosis (DVT) of proximal vein of left lower extremity (HCC) 05/04/2024    Acute saddle pulmonary embolism, unspecified whether acute cor pulmonale present (HCC) 05/03/2024    Trigeminal neuralgia of left side of face 09/08/2015    Trigeminal neuralgia of right side of face 09/08/2015    Chronic nonintractable headache 09/08/2015       Past Medical History:    Carotid stenosis    Deep vein thrombosis (HCC)    Diabetes (HCC)    Pulmonary embolism (HCC)    Sleep apnea    CPAP    Sleep apnea, obstructive    mild       Past Surgical History:   Procedure Laterality Date    Colonoscopy      Colonoscopy      Colonoscopy N/A 11/20/2019    Procedure: COLONOSCOPY;  Surgeon: Johann Coelho MD;  Location: [facility] ENDOSCOPY    Shoulder surg proc unlisted Left        Medications Prior to Admission   Medication Sig Dispense Refill Last Dose/Taking    rivaroxaban (XARELTO) 10 MG Oral Tab Take 1 tablet (10 mg total) by mouth daily with food. 90 tablet 3 1/6/2025    Tirzepatide (MOUNJARO) 5 MG/0.5ML Subcutaneous Solution Pen-injector Inject 5 mg into the skin once a week. (Patient taking differently: Inject 5 mg into the skin once a week. Every Wednesday) 6 mL 1 1/2/2025    latanoprost 0.005 % Ophthalmic Solution INSTILL 1 DROP IN BOTH EYE(S) DAILY AT BEDTIME   Taking    Multiple Vitamins-Minerals (MULTI FOR HIM 50+) Oral Tab Take  by mouth.   1/3/2025    Cholecalciferol (VITAMIN D) 50 MCG ( UT) Oral Cap Take by mouth.   1/3/2025    OneTouch Delica Lancets 33G Does not apply Misc 4 x daily 200 each 1     [] ciprofloxacin 250 MG Oral Tab Take 1 tablet (250 mg total) by mouth 2 (two) times daily for 7 days. 14 tablet 0     ONETOUCH ULTRA In Vitro Strip USE 1 STRIP EVERY DAY (Patient not taking: Reported on 2024) 100 strip 1     Blood Glucose Monitoring Suppl (ONETOUCH ULTRA 2) w/Device Does not apply Kit 1 strip by In Vitro route daily. 200 kit 0     OneTouch Delica Lancets 33G Does not apply Misc Test 2 x daily (Patient not taking: Reported on 2024) 200 each 0      Current Facility-Administered Medications Ordered in Epic   Medication Dose Route Frequency Provider Last Rate Last Admin    lactated ringers infusion   Intravenous Continuous Belkis Prado CRNA 20 mL/hr at 25 1214 New Bag at 25 1214     No current [facility]-ordered outpatient medications on file.       No Known Allergies    Family History   Problem Relation Age of Onset    Cancer Father 72        colon cancer     Social History     Socioeconomic History    Marital status: Single     Spouse name: Not on file    Number of children: Not on file    Years of education: Not on file    Highest education level: Not on file   Occupational History    Not on file   Tobacco Use    Smoking status: Never     Passive exposure: Never    Smokeless tobacco: Never   Vaping Use    Vaping status: Never Used   Substance and Sexual Activity    Alcohol use: Yes     Alcohol/week: 2.0 standard drinks of alcohol     Types: 2 Glasses of wine per week     Comment: occasionally    Drug use: No    Sexual activity: Not on file   Other Topics Concern     Service Not Asked    Blood Transfusions Not Asked    Caffeine Concern No     Comment: tea    Occupational Exposure Not Asked    Hobby Hazards Not Asked    Sleep Concern Not Asked    Stress Concern Not Asked    Weight  Concern Not Asked    Special Diet Not Asked    Back Care Not Asked    Exercise Yes    Bike Helmet Not Asked    Seat Belt Not Asked    Self-Exams Not Asked   Social History Narrative    Not on file     Social Drivers of Health     Financial Resource Strain: Low Risk  (5/8/2024)    Financial Resource Strain     Difficulty of Paying Living Expenses: Not very hard     Med Affordability: No   Food Insecurity: Low Risk  (12/9/2024)    Received from [facility]    Food Insecurity     Have there been times that your food ran out, and you didn't have money to get more?: No     Are there times that you worry that this might happen?: No   Transportation Needs: Low Risk  (12/9/2024)    Received from [facility]    Transportation Needs     Do you have trouble getting transportation to medical appointments?: No     How do you normally get to and from your appointments?: Not on file   Physical Activity: Not on file   Stress: Not on file   Social Connections: Not on file   Housing Stability: Not on file (12/9/2024)       Available pre-op labs reviewed.  Lab Results   Component Value Date    WBC 3.9 (L) 10/29/2024    RBC 4.48 10/29/2024    HGB 14.3 10/29/2024    HCT 42.7 10/29/2024    MCV 95.3 10/29/2024    MCH 31.9 10/29/2024    MCHC 33.5 10/29/2024    RDW 13.6 10/29/2024    .0 10/29/2024     Lab Results   Component Value Date     11/21/2024    K 4.4 11/21/2024     11/21/2024    CO2 26.0 11/21/2024    BUN 15 11/21/2024    CREATSERUM 1.25 11/21/2024    GLU 73 11/21/2024    PGLU 91 01/09/2025    CA 10.1 11/21/2024          Vital Signs:  Body mass index is 28.73 kg/m².   height is 1.803 m (5' 11\") and weight is 93.4 kg (206 lb). His blood pressure is 120/73 and his pulse is 74. His respiration is 12 and oxygen saturation is 98%.   Vitals:    01/02/25 1248 01/09/25 1210   BP:  120/73   Pulse:  74   Resp:  12   SpO2:  98%   Weight: 93.4 kg (206 lb) 93.4 kg (206 lb)   Height:  1.803 m (5' 11\") 1.803 m (5' 11\")        Anesthesia Evaluation     Patient summary reviewed and Nursing notes reviewed    No history of anesthetic complications   Airway   Mallampati: II  TM distance: >3 FB  Dental          Pulmonary - normal exam   (+) sleep apnea    ROS comment: Does not use CPAP machine  Cardiovascular - normal exam  Exercise tolerance: good    Neuro/Psych    (+)  neuromuscular disease,        GI/Hepatic/Renal    (+) bowel prep    Endo/Other - negative ROS   (+) diabetes mellitus  Abdominal  - normal exam                 Anesthesia Plan:   ASA:  3  Plan:   MAC  Post-op Pain Management: IV analgesics and Oral pain medication  Informed Consent Plan and Risks Discussed With:  Patient  Discussed plan with:  Surgeon      I have informed Niko Mast and/or legal guardian or family member of the nature of the anesthetic plan, benefits, risks including possible dental damage if relevant, major complications, and any alternative forms of anesthetic management.   All of the patient's questions were answered to the best of my ability. The patient desires the anesthetic management as planned.  Siddharth Cerrato  11/20/2019 2:24 PM

## 2025-01-09 NOTE — ANESTHESIA POSTPROCEDURE EVALUATION
Patient: Niko Mast    Procedure Summary       Date: 01/09/25 Room / Location: [facility] ENDOSCOPY 04 / [facility] ENDOSCOPY    Anesthesia Start: 1305 Anesthesia Stop: 1335    Procedure: COLONOSCOPY Diagnosis:       Personal history of colonic polyps      (polyp)    Surgeons: Johann Coelho MD Anesthesiologist: Belkis Prado CRNA    Anesthesia Type: MAC ASA Status: 3            Anesthesia Type: MAC    Vitals Value Taken Time   /60 01/09/25 1334   Temp 97 °F (36.1 °C) 01/09/25 1334   Pulse 75 01/09/25 1334   Resp 18 01/09/25 1334   SpO2 99 % 01/09/25 1334       [facility] AN Post Evaluation:   Patient Evaluated in PACU  Patient Participation: complete - patient participated  Level of Consciousness: awake and alert  Pain Score: 0  Pain Management: adequate  Airway Patency:patent  Dental exam unchanged from preop  Yes    Nausea/Vomiting: none  Cardiovascular Status: acceptable  Respiratory Status: acceptable  Postoperative Hydration stable      Belkis Prado CRNA  1/9/2025 1:35 PM

## 2025-01-13 ENCOUNTER — TELEPHONE (OUTPATIENT)
Facility: CLINIC | Age: 60
End: 2025-01-13

## 2025-01-13 NOTE — TELEPHONE ENCOUNTER
I reviewed below complete result note with the patient.  He voiced understanding.  He wanted to know if he needed to do anything about the hemorrhoids, I let him know as long as not bleeding or painful that Dr. Coelho usually recommends to include fiber in the diet to prevent hard stools.      Health Maintenance Updated.    5 year colonoscopy recall entered into patient outreach in [facility].  Next colonoscopy will be due 1/9/2030.

## 2025-01-13 NOTE — TELEPHONE ENCOUNTER
----- Message from Johann Coelho sent at 1/10/2025  3:39 PM CST -----  I wanted to get back to you with your colonoscopy results.  You had 4 colon polyps removed which were benign.  I would advise a repeat colonoscopy in 5 years to make sure no new polyps are forming.      You also have internal hemorrhoids.  Please call with any questions.

## 2025-01-21 ENCOUNTER — TELEPHONE (OUTPATIENT)
Dept: ENDOCRINOLOGY CLINIC | Facility: CLINIC | Age: 60
End: 2025-01-21

## 2025-01-21 DIAGNOSIS — E11.65 UNCONTROLLED TYPE 2 DIABETES MELLITUS WITH HYPERGLYCEMIA (HCC): Primary | ICD-10-CM

## 2025-01-21 NOTE — TELEPHONE ENCOUNTER
Patient requesting refills for Mounjaro.  Please call.  Thank you.    Current Outpatient Medications   Medication Sig Dispense Refill    Tirzepatide (MOUNJARO) 5 MG/0.5ML Subcutaneous Solution Pen-injector Inject 5 mg into the skin once a week. (Patient taking differently: Inject 5 mg into the skin once a week. Every Wednesday) 6 mL 1

## 2025-01-22 RX ORDER — TIRZEPATIDE 5 MG/.5ML
5 INJECTION, SOLUTION SUBCUTANEOUS WEEKLY
Qty: 6 ML | Refills: 1 | Status: SHIPPED | OUTPATIENT
Start: 2025-01-22

## 2025-01-22 NOTE — TELEPHONE ENCOUNTER
Patient called Dr. Means's office.  States he received a [employer] message notification, however, he is not able to log in to [employer].  Relayed [employer] message from endocrinology staff.  Patient advised to contact pharmacy when his medication is ready for .  Patient verbalized understanding.

## 2025-01-22 NOTE — TELEPHONE ENCOUNTER
Endocrine Refill protocol for oral and injectable diabetic medications    Protocol Criteria:  PASSED  Reason: N/A    If all below requirements are met, send a 90-day supply with 1 refill per provider protocol.    Verify appointment with Endocrinology completed in the last 6 months or scheduled in the next 3 months.  Verify A1C has been completed within the last 6 months and is below 8.5%     Last completed office visit: 12/11/2024 Berna Pierre APRN   Next scheduled Follow up:   Future Appointments   Date Time Provider Department Center   5/29/2025 11:15 AM Valeriano Ellison MD [facility]   6/16/2025 10:30 AM [facility]      Last A1c result: Last A1c value was 5.6% done 12/11/2024.

## 2025-02-19 RX ORDER — BLOOD SUGAR DIAGNOSTIC
STRIP MISCELLANEOUS DAILY
Qty: 100 STRIP | Refills: 1 | Status: SHIPPED | OUTPATIENT
Start: 2025-02-19

## 2025-02-19 NOTE — TELEPHONE ENCOUNTER
Endocrine Refill protocol for Glucose testing supplies     Protocol Criteria: PASSED Reason: N/A    If below requirement is met, send a 90-day supply with 1 refill per provider protocol.    Verify appointment with Endocrinology completed in the last 6 months or scheduled in the next 3 months.    Last completed office visit: 12/11/2024 Berna Pierre APRN   Next scheduled Follow up:   Future Appointments   Date Time Provider Department Center   6/16/2025 10:30 AM Berna Pierre APRN [facility]

## 2025-02-27 RX ORDER — LANCETS 33 GAUGE
1 EACH MISCELLANEOUS 4 TIMES DAILY
Qty: 400 EACH | Refills: 0 | Status: SHIPPED | OUTPATIENT
Start: 2025-02-27

## 2025-02-27 NOTE — TELEPHONE ENCOUNTER
Endocrine Refill protocol for Glucose testing supplies     Protocol Criteria: PASSED Reason: N/A    If below requirement is met, send a 90-day supply with 1 refill per provider protocol.    Verify appointment with Endocrinology completed in the last 6 months or scheduled in the next 3 months.    Last completed office visit: 12/11/2024 Berna Pierre APRN   Next scheduled Follow up:   Future Appointments   Date Time Provider Department Center   5/29/2025 11:15 AM [facility]   6/16/2025 10:30 AM Berna Pierre APRN [facility]

## 2025-03-04 ENCOUNTER — TELEPHONE (OUTPATIENT)
Dept: FAMILY MEDICINE CLINIC | Facility: CLINIC | Age: 60
End: 2025-03-04

## 2025-03-04 NOTE — TELEPHONE ENCOUNTER
Patient called to say he has Family Medical Leave Act papers that need to be filled out. He requested back in Oct but he did not have enough hours to get approved. Patient will drop the forms off at the office.

## 2025-03-05 ENCOUNTER — TELEPHONE (OUTPATIENT)
Dept: FAMILY MEDICINE CLINIC | Facility: CLINIC | Age: 60
End: 2025-03-05

## 2025-03-05 NOTE — TELEPHONE ENCOUNTER
Patient stopped in the [place] Office, dropped off FMLA forms for re-certification. Forms, completed and signed HIPAA and FCR emailed to [email or web address], original forms left in the [place] Office. Please let patient know when the forms are completed and he'll pick them up in the [place] Office.

## 2025-03-16 NOTE — TELEPHONE ENCOUNTER
Family Medical Leave Act forms rec'd via email with incomplete authorization. Patient stated would like to pick forms up at providers office when completed. Logged for processing. Called patient to confirm start date for Family Medical Leave Act and Left voicemail to call back [phone]. Form is saved and completed. Just waiting on call back.

## 2025-03-17 NOTE — TELEPHONE ENCOUNTER
Dr. Means,    Patient is requesting intermittent Family Medical Leave Act due to recurrent Pulmonary embolism and type 2 diabetes, 2-3 flare ups/week lasting 1-8 hours or 3 days. Forms will need your hand signature. Do you support?    Thanks so much for your time,  Carla Rossi dept

## 2025-03-17 NOTE — TELEPHONE ENCOUNTER
Forms completed with hand signature ans scanned in chart. Patient requested to pick forms up at providers office. Patient was called and informed of completion and verbalized understanding.

## 2025-03-18 ENCOUNTER — TELEPHONE (OUTPATIENT)
Dept: ENDOCRINOLOGY CLINIC | Facility: CLINIC | Age: 60
End: 2025-03-18

## 2025-03-18 DIAGNOSIS — I82.4Y2 ACUTE DEEP VEIN THROMBOSIS (DVT) OF PROXIMAL VEIN OF LEFT LOWER EXTREMITY (HCC): ICD-10-CM

## 2025-03-18 NOTE — TELEPHONE ENCOUNTER
Per chart review, mounjaro 5 mg was sent on 1/22 for 90 day supply with 1 refill.     Called [facility] pharmacy. States they are working on refill right now, will have it ready by tomorrow.     Called pt and notified.

## 2025-03-21 RX ORDER — RIVAROXABAN 20 MG/1
20 TABLET, FILM COATED ORAL
Qty: 90 TABLET | Refills: 1 | OUTPATIENT
Start: 2025-03-21

## 2025-03-21 NOTE — TELEPHONE ENCOUNTER
Discontinued on 10/11/2024 by Akiko Zavaleta RN     Xarelto 10 mg sent 11/25/2024 for 90 day supply with 3 refills

## 2025-04-03 ENCOUNTER — OFFICE VISIT (OUTPATIENT)
Dept: FAMILY MEDICINE CLINIC | Facility: CLINIC | Age: 60
End: 2025-04-03
Payer: COMMERCIAL

## 2025-04-03 VITALS
DIASTOLIC BLOOD PRESSURE: 70 MMHG | BODY MASS INDEX: 29.4 KG/M2 | OXYGEN SATURATION: 98 % | RESPIRATION RATE: 18 BRPM | SYSTOLIC BLOOD PRESSURE: 109 MMHG | HEART RATE: 70 BPM | HEIGHT: 71 IN | TEMPERATURE: 98 F | WEIGHT: 210 LBS

## 2025-04-03 DIAGNOSIS — M20.11 VALGUS DEFORMITY OF BOTH GREAT TOES: ICD-10-CM

## 2025-04-03 DIAGNOSIS — E11.9 TYPE 2 DIABETES MELLITUS WITHOUT COMPLICATION, WITHOUT LONG-TERM CURRENT USE OF INSULIN (HCC): Primary | ICD-10-CM

## 2025-04-03 DIAGNOSIS — B35.1 ONYCHOMYCOSIS: ICD-10-CM

## 2025-04-03 DIAGNOSIS — M79.671 PAIN IN RIGHT FOOT: ICD-10-CM

## 2025-04-03 DIAGNOSIS — M20.12 VALGUS DEFORMITY OF BOTH GREAT TOES: ICD-10-CM

## 2025-04-03 LAB
CREAT UR-SCNC: 228.1 MG/DL
MICROALBUMIN UR-MCNC: <0.3 MG/DL

## 2025-04-03 PROCEDURE — 3061F NEG MICROALBUMINURIA REV: CPT | Performed by: FAMILY MEDICINE

## 2025-04-03 PROCEDURE — 3078F DIAST BP <80 MM HG: CPT | Performed by: FAMILY MEDICINE

## 2025-04-03 PROCEDURE — 3074F SYST BP LT 130 MM HG: CPT | Performed by: FAMILY MEDICINE

## 2025-04-03 PROCEDURE — 99214 OFFICE O/P EST MOD 30 MIN: CPT | Performed by: FAMILY MEDICINE

## 2025-04-03 PROCEDURE — 3008F BODY MASS INDEX DOCD: CPT | Performed by: FAMILY MEDICINE

## 2025-04-03 RX ORDER — CEFTRIAXONE 500 MG/1
INJECTION, POWDER, FOR SOLUTION INTRAMUSCULAR; INTRAVENOUS
COMMUNITY
Start: 2024-11-29

## 2025-04-03 RX ORDER — BRIMONIDINE TARTRATE 2 MG/ML
1 SOLUTION/ DROPS OPHTHALMIC 2 TIMES DAILY
COMMUNITY

## 2025-04-03 RX ORDER — BRIMONIDINE TARTRATE AND TIMOLOL MALEATE 2; 5 MG/ML; MG/ML
1 SOLUTION OPHTHALMIC 2 TIMES DAILY
COMMUNITY

## 2025-04-03 RX ORDER — CYCLOBENZAPRINE HCL 10 MG
1 TABLET ORAL 3 TIMES DAILY PRN
COMMUNITY

## 2025-04-03 NOTE — PATIENT INSTRUCTIONS
Medication reviewed and renewed where needed and appropriate.  Comply with medications.  Monitor blood pressures and record at home. Limit salt intake.  Encouraged physical fitness and daily physical activity daily.  To podiatry for bunions, onychomycosis, and bilateral valgus deformity.  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

## 2025-04-09 NOTE — PROGRESS NOTES
Subjective:     Patient ID: Niko Mast is a 59 year old male.    This patient has a well-established 59-year-old diabetic with a history of DVT who presents for diabetic update via urine assessment for micro proteins and for diabetic foot exam.  Last A1c at 5.6 in December 2024.    Patient denies excessive hunger and thirst.  There is no urinary frequency or nocturia.    Patient has consistent and worsening right foot pain.  During a diabetic foot exam on today it was noted that the patient has significant fungal disease regarding his toenails bilaterally and also significant valgus deformity/bunions, right greater than left.            History/Other:   Review of Systems  Current Medications[1]  Allergies:Allergies[2]    Past Medical History[3]   Past Surgical History[4]   Family History[5]   Social History: Short Social Hx on File[6]     Objective:   Vitals:    04/03/25 1145   BP: 109/70   Pulse: 70   Resp: 18   Temp: 98 °F (36.7 °C)       Physical Exam  Musculoskeletal:      Right foot: Deformity and bunion present.      Left foot: Deformity and bunion present.   Feet:      Right foot:      Toenail Condition: Right toenails are abnormally thick. Fungal disease present.     Left foot:      Toenail Condition: Left toenails are abnormally thick. Fungal disease present.     Comments: Bilateral barefoot skin diabetic exam is normal, visualized feet and the appearance is normal.  Bilateral monofilament/sensation of both feet is normal.  Pulsation pedal pulse exam of both lower legs/feet is normal as well.    Valve pulmonary bilateral feet            Assessment & Plan:   1. Type 2 diabetes mellitus without complication, without long-term current use of insulin ([facility])  Specimen collected.  - Microalb/Creat Ratio, Random Urine [E]; Future  - Microalb/Creat Ratio, Random Urine [E]    2. Pain in right foot  Likely secondary to #3.    3. Valgus deformity of both great toes  Severe valgus deformities bilaterally.    4.  Onychomycosis  Patient to see podiatry and the entire foot care will be addressed.  Diabetic foot exam done during this encounter and is as normal.      Orders Placed This Encounter   Procedures    Microalb/Creat Ratio, Random Urine [E]       Meds This Visit:  Requested Prescriptions      No prescriptions requested or ordered in this encounter       Imaging & Referrals:  None     Patient Instructions   Medication reviewed and renewed where needed and appropriate.  Comply with medications.  Monitor blood pressures and record at home. Limit salt intake.  Encouraged physical fitness and daily physical activity daily.  To podiatry for bunions, onychomycosis, and bilateral valgus deformity.  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    Return in about 4 months (around 8/3/2025), or if symptoms worsen or fail to improve.         [1]   Current Outpatient Medications   Medication Sig Dispense Refill    cefTRIAXone 500 MG Injection Recon Soln Take 500 mg every day by injection route for 1 day.      Brimonidine Tartrate-Timolol 0.2-0.5 % Ophthalmic Solution Place 1 drop into both eyes 2 (two) times daily.      brimonidine 0.2 % Ophthalmic Solution Place 1 drop into both eyes 2 (two) times daily.      cyclobenzaprine 10 MG Oral Tab Take 1 tablet (10 mg total) by mouth 3 (three) times daily as needed.      Lancets (ONETOUCH DELICA PLUS [id number]) Does not apply Misc 4 TIMES A  each 0    ONETOUCH ULTRA In Vitro Strip USE 1 STRIP EVERY  strip 1    Tirzepatide (MOUNJARO) 5 MG/0.5ML Subcutaneous Solution Auto-injector Inject 5 mg into the skin once a week. 6 mL 1    rivaroxaban (XARELTO) 10 MG Oral Tab Take 1 tablet (10 mg total) by mouth daily with food. 90 tablet 3    Tirzepatide (MOUNJARO) 5 MG/0.5ML Subcutaneous Solution Pen-injector Inject 5 mg into the skin once a week. (Patient taking differently: Inject 5 mg into the skin once a week. Every Wednesday) 6 mL 1    latanoprost 0.005 % Ophthalmic  Solution INSTILL 1 DROP IN BOTH EYE(S) DAILY AT BEDTIME      Blood Glucose Monitoring Suppl (ONETOUCH ULTRA 2) w/Device Does not apply Kit 1 strip by In Vitro route daily. 200 kit 0    OneTouch Delica Lancets 33G Does not apply Misc Test 2 x daily (Patient not taking: Reported on 11/25/2024) 200 each 0    Multiple Vitamins-Minerals (MULTI FOR HIM 50+) Oral Tab Take by mouth.      Cholecalciferol (VITAMIN D) 50 MCG (2000 UT) Oral Cap Take by mouth.     [2] No Known Allergies  [3]   Past Medical History:   Carotid stenosis    Deep vein thrombosis (HCC)    Diabetes (HCC)    Pulmonary embolism (HCC)    Sleep apnea    CPAP    Sleep apnea, obstructive    mild   [4]   Past Surgical History:  Procedure Laterality Date    Colonoscopy      Colonoscopy      Colonoscopy N/A 11/20/2019    Procedure: COLONOSCOPY;  Surgeon: Johann Coelho MD;  Location: [facility] ENDOSCOPY    Colonoscopy N/A 1/9/2025    Procedure: COLONOSCOPY;  Surgeon: Johann Coelho MD;  Location: [facility] ENDOSCOPY    Colonoscopy & polypectomy N/A 01/09/2025    polyps, hemorrhoids.  Dr. Coelho    Shoulder surg proc unlisted Left    [5]   Family History  Problem Relation Age of Onset    Cancer Father 72        colon cancer   [6]   Social History  Socioeconomic History    Marital status: Single   Tobacco Use    Smoking status: Never     Passive exposure: Never    Smokeless tobacco: Never   Vaping Use    Vaping status: Never Used   Substance and Sexual Activity    Alcohol use: Yes     Alcohol/week: 2.0 standard drinks of alcohol     Types: 2 Glasses of wine per week     Comment: occasionally    Drug use: No   Other Topics Concern    Caffeine Concern No     Comment: tea    Exercise Yes     Social Drivers of Health     Food Insecurity: Low Risk  (12/9/2024)    Received from [facility]    Food Insecurity     Have there been times that your food ran out, and you didn't have money to get more?: No     Are there times that you worry that this might  happen?: No   Transportation Needs: Low Risk  (12/9/2024)    Received from [facility]    Transportation Needs     Do you have trouble getting transportation to medical appointments?: No

## 2025-04-29 ENCOUNTER — NURSE TRIAGE (OUTPATIENT)
Dept: FAMILY MEDICINE CLINIC | Facility: CLINIC | Age: 60
End: 2025-04-29

## 2025-04-29 NOTE — TELEPHONE ENCOUNTER
Action Requested: Summary for Provider     []  Critical Lab, Recommendations Needed  [] Need Additional Advice  [x]   FYI    []   Need Orders  [] Need Medications Sent to Pharmacy  []  Other     SUMMARY: Patient stated that since 4/27/2025 has been having burning with urination and urinary frequency/urgency. No fevers. No blood in the urine. No back pain. No other symptoms. Declined appointment today since working. Available after 2pm tomorrow.   Appointment made for tomorrow at 3pm with Dr Lott in [place]- address provided.   Advised patient to drink plenty of water in the meantime. Patient agreed.  Reason for call: Urinary Symptoms (Burning with urination, urinary frequency/urgency)  Onset: Apr 27, 2025    Reason for Disposition   All other males with painful urination, or patient wants to be seen    Protocols used: Urination Pain - Male-A-[place]

## 2025-04-30 ENCOUNTER — OFFICE VISIT (OUTPATIENT)
Facility: CLINIC | Age: 60
End: 2025-04-30
Payer: COMMERCIAL

## 2025-04-30 VITALS
HEART RATE: 82 BPM | BODY MASS INDEX: 29.4 KG/M2 | HEIGHT: 71 IN | WEIGHT: 210 LBS | DIASTOLIC BLOOD PRESSURE: 80 MMHG | OXYGEN SATURATION: 98 % | SYSTOLIC BLOOD PRESSURE: 126 MMHG

## 2025-04-30 DIAGNOSIS — R35.0 URINARY FREQUENCY: Primary | ICD-10-CM

## 2025-04-30 DIAGNOSIS — R30.0 DYSURIA: ICD-10-CM

## 2025-04-30 LAB
APPEARANCE: CLEAR
BILIRUBIN: NEGATIVE
GLUCOSE (URINE DIPSTICK): NEGATIVE MG/DL
KETONES (URINE DIPSTICK): NEGATIVE MG/DL
LEUKOCYTES: NEGATIVE
MULTISTIX LOT#: NORMAL NUMERIC
NITRITE, URINE: NEGATIVE
OCCULT BLOOD: NEGATIVE
PH, URINE: 5.5 (ref 4.5–8)
PROTEIN (URINE DIPSTICK): NEGATIVE MG/DL
SPECIFIC GRAVITY: 1.02 (ref 1–1.03)
URINE-COLOR: YELLOW
UROBILINOGEN,SEMI-QN: 0.2 MG/DL (ref 0–1.9)

## 2025-04-30 PROCEDURE — 99213 OFFICE O/P EST LOW 20 MIN: CPT | Performed by: FAMILY MEDICINE

## 2025-04-30 PROCEDURE — 3008F BODY MASS INDEX DOCD: CPT | Performed by: FAMILY MEDICINE

## 2025-04-30 PROCEDURE — 81002 URINALYSIS NONAUTO W/O SCOPE: CPT | Performed by: FAMILY MEDICINE

## 2025-04-30 PROCEDURE — 3074F SYST BP LT 130 MM HG: CPT | Performed by: FAMILY MEDICINE

## 2025-04-30 PROCEDURE — 3079F DIAST BP 80-89 MM HG: CPT | Performed by: FAMILY MEDICINE

## 2025-04-30 NOTE — PROGRESS NOTES
HPI:    Patient ID: Niko Mast is a 59 year old male who presents for urinary frequency.    HPI  Sx started this past Thursday.  Had some dysuria.   Increased water intake and has improved.  Also has urinary frequency.  Some urgency.   No sensation of incomplete emptying.  Strong stream.   No dribbling of urine.   Nocturia if he drinks lots of water.   Has had UTIs in the past.   Never seen a urologist.   No hx of prostatitis.   No hx of kidney stones.  Drinks two bottles of water per day. Has increased a bit since sx started and feels much better.    Past Medical History[1]     Current Medications[2]     Allergies[3]    Review of Systems   See HPI. Otherwise negaitve.        /80   Pulse 82   Ht 5' 11\" (1.803 m)   Wt 210 lb (95.3 kg)   SpO2 98%   BMI 29.29 kg/m²     PHYSICAL EXAM:   Physical Exam  Constitutional:       General: He is not in acute distress.     Appearance: Normal appearance. He is not ill-appearing, toxic-appearing or diaphoretic.   HENT:      Head: Normocephalic and atraumatic.   Eyes:      Extraocular Movements: Extraocular movements intact.      Conjunctiva/sclera: Conjunctivae normal.   Cardiovascular:      Rate and Rhythm: Normal rate and regular rhythm.      Pulses: Normal pulses.      Heart sounds: Normal heart sounds. No murmur heard.     No friction rub. No gallop.   Pulmonary:      Effort: Pulmonary effort is normal. No respiratory distress.      Breath sounds: Normal breath sounds. No wheezing, rhonchi or rales.   Abdominal:      General: Abdomen is flat. Bowel sounds are normal. There is no distension.      Palpations: Abdomen is soft. There is no mass.      Tenderness: There is no abdominal tenderness. There is no right CVA tenderness, left CVA tenderness, guarding or rebound.      Hernia: No hernia is present.   Musculoskeletal:      Cervical back: Neck supple.      Right lower leg: No edema.      Left lower leg: No edema.   Skin:     General: Skin is warm and dry.       Capillary Refill: Capillary refill takes less than 2 seconds.   Neurological:      General: No focal deficit present.      Mental Status: He is alert.   Psychiatric:         Mood and Affect: Mood normal.         Behavior: Behavior normal.         Thought Content: Thought content normal.         Judgment: Judgment normal.             ASSESSMENT/PLAN:     Encounter Diagnoses   Name Primary?    Urinary frequency Yes    Dysuria        1. Urinary frequency  - URINALYSIS NONAUTO W/O SCOPE    2. Dysuria     -Reviewed prior labs, including prior UA & Ucx as well as PSA.  -Urine dip today normal.  -Recommend increasing water intake to 60 oz/day and symptoms should resolve. If not, recommend f/u with PCP to discuss urology referral.     Meds This Visit:  Requested Prescriptions      No prescriptions requested or ordered in this encounter       Imaging & Referrals:  None       Rodriguez Lott DO  ID#[id number]       [1]   Past Medical History:   Carotid stenosis    Deep vein thrombosis (HCC)    Diabetes (HCC)    Pulmonary embolism (HCC)    Sleep apnea    CPAP    Sleep apnea, obstructive    mild   [2]   Current Outpatient Medications   Medication Sig Dispense Refill    Brimonidine Tartrate-Timolol 0.2-0.5 % Ophthalmic Solution Place 1 drop into both eyes 2 (two) times daily.      brimonidine 0.2 % Ophthalmic Solution Place 1 drop into both eyes 2 (two) times daily.      cefTRIAXone 500 MG Injection Recon Soln Take 500 mg every day by injection route for 1 day.      cyclobenzaprine 10 MG Oral Tab Take 1 tablet (10 mg total) by mouth 3 (three) times daily as needed.      Lancets (ONETOUCH DELICA PLUS [id number]) Does not apply Misc 4 TIMES A  each 0    ONETOUCH ULTRA In Vitro Strip USE 1 STRIP EVERY  strip 1    Tirzepatide (MOUNJARO) 5 MG/0.5ML Subcutaneous Solution Auto-injector Inject 5 mg into the skin once a week. 6 mL 1    rivaroxaban (XARELTO) 10 MG Oral Tab Take 1 tablet (10 mg total) by mouth daily with food. 90  tablet 3    latanoprost 0.005 % Ophthalmic Solution INSTILL 1 DROP IN BOTH EYE(S) DAILY AT BEDTIME      Blood Glucose Monitoring Suppl (ONETOUCH ULTRA 2) w/Device Does not apply Kit 1 strip by In Vitro route daily. 200 kit 0    OneTouch Delica Lancets 33G Does not apply Misc Test 2 x daily (Patient not taking: Reported on 11/25/2024) 200 each 0    Multiple Vitamins-Minerals (MULTI FOR HIM 50+) Oral Tab Take by mouth.      Cholecalciferol (VITAMIN D) 50 MCG (2000 UT) Oral Cap Take by mouth.     [3] No Known Allergies

## 2025-05-16 DIAGNOSIS — I82.4Y2 ACUTE DEEP VEIN THROMBOSIS (DVT) OF PROXIMAL VEIN OF LEFT LOWER EXTREMITY (HCC): ICD-10-CM

## 2025-05-19 RX ORDER — RIVAROXABAN 20 MG/1
20 TABLET, FILM COATED ORAL
Qty: 90 TABLET | Refills: 1 | OUTPATIENT
Start: 2025-05-19

## 2025-05-19 NOTE — TELEPHONE ENCOUNTER
Outpatient Medication Detail     Disp Refills Start End    rivaroxaban (XARELTO) 10 MG Oral Tab 90 tablet 3 11/25/2024 --    Sig - Route: Take 1 tablet (10 mg total) by mouth daily with food. - Oral    Sent to pharmacy as: Rivaroxaban 10 MG Oral Tablet (Xarelto)    E-Prescribing Status: Receipt confirmed by pharmacy (11/25/2024  5:01 PM CST)      Pharmacy    [id number] - [address] AT [facility], [phone], [phone]

## 2025-05-23 ENCOUNTER — NURSE TRIAGE (OUTPATIENT)
Dept: FAMILY MEDICINE CLINIC | Facility: CLINIC | Age: 60
End: 2025-05-23

## 2025-05-23 NOTE — TELEPHONE ENCOUNTER
Action Requested: Summary for Provider     []  Critical Lab, Recommendations Needed  [] Need Additional Advice  [x]   FYI    []   Need Orders  [] Need Medications Sent to Pharmacy  []  Other     SUMMARY: Patient reports of middle low back pain for 4 days now. Denies injury and states pain came out of nowhere. Asking what he can take for pain considering that he is on a blood thinner. States certain positions or icing makes the pain better but pain comes back. Recalls being prescribed muscle relaxant in the past. Advised to schedule an appointment for evaluation if requesting medication for pain. He just wants to know what he can take.    Care advice given per protocol - avoid NSAID's, can only take Tylenol and to follow appropriate dosing not exceeding maximum dosage in 24 hours, ice, consider topical pain relievers or patches. Advised to call back for an appointment if symptom persists or worsen. Patient verbalized understanding and agreed with plan of care.     Reason for call: Low Back Pain  Onset: 4 Days Ago    Reason for Disposition   Back pain    Protocols used: Back Pain-A-[place]

## 2025-05-27 ENCOUNTER — OFFICE VISIT (OUTPATIENT)
Dept: FAMILY MEDICINE CLINIC | Facility: CLINIC | Age: 60
End: 2025-05-27

## 2025-05-27 VITALS
BODY MASS INDEX: 30 KG/M2 | SYSTOLIC BLOOD PRESSURE: 109 MMHG | DIASTOLIC BLOOD PRESSURE: 77 MMHG | WEIGHT: 213 LBS | HEART RATE: 77 BPM | OXYGEN SATURATION: 97 %

## 2025-05-27 DIAGNOSIS — M54.50 ACUTE BILATERAL LOW BACK PAIN WITHOUT SCIATICA: Primary | ICD-10-CM

## 2025-05-27 PROCEDURE — 3078F DIAST BP <80 MM HG: CPT | Performed by: FAMILY MEDICINE

## 2025-05-27 PROCEDURE — 99213 OFFICE O/P EST LOW 20 MIN: CPT | Performed by: FAMILY MEDICINE

## 2025-05-27 PROCEDURE — 3074F SYST BP LT 130 MM HG: CPT | Performed by: FAMILY MEDICINE

## 2025-05-27 RX ORDER — CYCLOBENZAPRINE HCL 10 MG
10 TABLET ORAL 3 TIMES DAILY
Qty: 30 TABLET | Refills: 1 | Status: SHIPPED | OUTPATIENT
Start: 2025-05-27 | End: 2025-06-16

## 2025-05-27 NOTE — PROGRESS NOTES
Subjective:   Niko Mast is a 59 year old female who presents for Low Back Pain (Pt C/o lower back pain X10 day ago. No injury. )       History/Other:   History of Present Illness  Niko Mast is a 59 year old male who presents with acute bilateral low back pain.    He has experienced low back pain for ten days without a specific incident causing it. The pain is described as stiffness that worsens with prolonged sitting and improves with movement, such as at the gym. Occasionally, sharp pain causes his knees to buckle. The pain is localized across the lower back and does not radiate down the legs.    He recalls similar symptoms in the past, for which he was prescribed muscle relaxant. He has been using Tylenol for pain relief and has applied Icy Hot and ice, which have provided some relief.    He is currently on Xarelto, an anticoagulant.      Chief Complaint Reviewed and Verified  Nursing Notes Reviewed and   Verified  Tobacco Reviewed  Allergies Reviewed  Medications Reviewed    Problem List Reviewed         Tobacco:  He has never smoked tobacco.    Current Medications[1]           Review of Systems:  See above      Objective:   /77   Pulse 77   Wt 213 lb (96.6 kg)   SpO2 97%   BMI 29.71 kg/m²    Estimated body mass index is 29.71 kg/m² as calculated from the following:    Height as of 4/30/25: 5' 11\" (1.803 m).    Weight as of this encounter: 213 lb (96.6 kg).  Results  RADIOLOGY  No results found.       Physical Exam  Physical Exam  GENERAL: Normal appearance.  CHEST: Clear to auscultation bilaterally.  CARDIOVASCULAR: Regular rate and rhythm.  MUSCULOSKELETAL: Lumbar spine non-tender, no deformity, normal flexion, pain on extension. Tight hamstrings bilaterally, no radiating pain on straight leg raise. Normal lower extremity strength.      Assessment & Plan:   1. Acute bilateral low back pain without sciatica (Primary)  -     Physical Therapy Referral - External  Other orders  -      Cyclobenzaprine HCl; Take 1 tablet (10 mg total) by mouth 3 (three) times daily for 20 days.  Dispense: 30 tablet; Refill: 1      Assessment & Plan  Assessment & Plan  Acute bilateral low back pain without sciatica  Acute bilateral low back pain for 10 days, worsens with prolonged sitting, improves with movement and application of ice. No radiating pain down the legs. Tight hamstrings on examination. Previous episodes treated with muscle relaxants. No NSAIDs due to Xarelto use. Tylenol used for pain management. Physical therapy recommended for structured treatment and long-term prevention of recurrence.  - Refer to physical therapy twice a week for 4-6 weeks for massage, stretching, and exercise. Develop a home exercise program post-therapy.  - Prescribe cyclobenzaprine three times a day as needed. Caution about drowsiness and avoid combining with alcohol or other sedatives.  - Continue Tylenol for pain management.  - Advise continued use of Icy Hot or cold packs for symptomatic relief.            Return if symptoms worsen or fail to improve.      Gemini Lerner MD              [1]   Current Outpatient Medications   Medication Sig Dispense Refill    cyclobenzaprine 10 MG Oral Tab Take 1 tablet (10 mg total) by mouth 3 (three) times daily for 20 days. 30 tablet 1    Brimonidine Tartrate-Timolol 0.2-0.5 % Ophthalmic Solution Place 1 drop into both eyes 2 (two) times daily.      brimonidine 0.2 % Ophthalmic Solution Place 1 drop into both eyes 2 (two) times daily.      cefTRIAXone 500 MG Injection Recon Soln Take 500 mg every day by injection route for 1 day.      cyclobenzaprine 10 MG Oral Tab Take 1 tablet (10 mg total) by mouth 3 (three) times daily as needed.      Lancets (ONETOUCH DELICA PLUS [id number]) Does not apply Misc 4 TIMES A  each 0    ONETOUCH ULTRA In Vitro Strip USE 1 STRIP EVERY  strip 1    Tirzepatide (MOUNJARO) 5 MG/0.5ML Subcutaneous Solution Auto-injector Inject 5 mg into the skin  once a week. 6 mL 1    rivaroxaban (XARELTO) 10 MG Oral Tab Take 1 tablet (10 mg total) by mouth daily with food. 90 tablet 3    latanoprost 0.005 % Ophthalmic Solution INSTILL 1 DROP IN BOTH EYE(S) DAILY AT BEDTIME      Blood Glucose Monitoring Suppl (ONETOUCH ULTRA 2) w/Device Does not apply Kit 1 strip by In Vitro route daily. 200 kit 0    Multiple Vitamins-Minerals (MULTI FOR HIM 50+) Oral Tab Take by mouth.      Cholecalciferol (VITAMIN D) 50 MCG (2000 UT) Oral Cap Take by mouth.      OneTouch Delica Lancets 33G Does not apply Misc Test 2 x daily (Patient not taking: Reported on 11/25/2024) 200 each 0

## 2025-05-27 NOTE — PROGRESS NOTES
The following individual(s) verbally consented to be recorded using ambient AI listening technology and understand that they can each withdraw their consent to this listening technology at any point by asking the clinician to turn off or pause the recording:    Patient name: Niko Mast  Additional names:

## 2025-05-29 ENCOUNTER — OFFICE VISIT (OUTPATIENT)
Dept: PULMONOLOGY | Facility: CLINIC | Age: 60
End: 2025-05-29

## 2025-05-29 VITALS
SYSTOLIC BLOOD PRESSURE: 102 MMHG | HEIGHT: 71 IN | RESPIRATION RATE: 12 BRPM | HEART RATE: 75 BPM | DIASTOLIC BLOOD PRESSURE: 66 MMHG | WEIGHT: 215.63 LBS | BODY MASS INDEX: 30.19 KG/M2 | OXYGEN SATURATION: 99 %

## 2025-05-29 DIAGNOSIS — I26.92 ACUTE SADDLE PULMONARY EMBOLISM, UNSPECIFIED WHETHER ACUTE COR PULMONALE PRESENT (HCC): Primary | ICD-10-CM

## 2025-05-29 PROCEDURE — 3078F DIAST BP <80 MM HG: CPT | Performed by: INTERNAL MEDICINE

## 2025-05-29 PROCEDURE — 99213 OFFICE O/P EST LOW 20 MIN: CPT | Performed by: INTERNAL MEDICINE

## 2025-05-29 PROCEDURE — 3074F SYST BP LT 130 MM HG: CPT | Performed by: INTERNAL MEDICINE

## 2025-05-29 PROCEDURE — 3008F BODY MASS INDEX DOCD: CPT | Performed by: INTERNAL MEDICINE

## 2025-05-29 NOTE — PROGRESS NOTES
The patient is a 59-year-old male who I know well from prior evaluation and comes in now for follow-up.  In general, he is doing well.  Notes that his breathing is good.  He remains on low-dose Xarelto.  He is not always vigilant with the use of the CPAP for sleep apnea.    Review of Systems:  Vision normal. Ear nose and throat normal. Bowel normal. Bladder function normal. No depression. No thyroid disease. No lymphatic system concerns.  No rash. Muscles and joints unremarkable. No weight loss no weight gain.    Physical Examination:  Vital signs normal. HEENT examination is unremarkable with pupils equal round and reactive to light and accommodation. Neck without adenopathy, thyromegaly, JVD nor bruit. Lungs clear to auscultation and percussion. Cardiac regular rate and rhythm no murmur. Abdomen nontender, without hepatosplenomegaly and no mass appreciable. Extremities and Musculoskeletal without clubbing cyanosis nor edema, and mobility acceptable. Neurologic grossly intact with symmetric tone and strength and reflex.    Assessment and plan:  1.  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  Continue current management, medicine refilled and patient to see me in 1 year.    2.  LEONEL-I encouraged the patient to be vigilant with PAP therapy every night all night.

## 2025-06-12 DIAGNOSIS — I82.4Y2 ACUTE DEEP VEIN THROMBOSIS (DVT) OF PROXIMAL VEIN OF LEFT LOWER EXTREMITY (HCC): ICD-10-CM

## 2025-06-13 DIAGNOSIS — E11.65 UNCONTROLLED TYPE 2 DIABETES MELLITUS WITH HYPERGLYCEMIA (HCC): ICD-10-CM

## 2025-06-13 NOTE — TELEPHONE ENCOUNTER
Patient requesting refills for Mounjaro.  Please call.  Thank you.    Current Outpatient Medications   Medication Sig Dispense Refill    Tirzepatide (MOUNJARO) 5 MG/0.5ML Subcutaneous Solution Auto-injector Inject 5 mg into the skin once a week. 6 mL 1

## 2025-06-16 RX ORDER — RIVAROXABAN 20 MG/1
20 TABLET, FILM COATED ORAL
Qty: 90 TABLET | Refills: 1 | OUTPATIENT
Start: 2025-06-16

## 2025-06-16 NOTE — TELEPHONE ENCOUNTER
Outpatient  Date/Time Action Taken User Additional Information   05/29/25 1151 Sign Valeriano Ellison MD Reorder from Order:[id number]   05/29/25 1151 Taking Flag Checked Valeriano Ellison MD [id number]     Provider Information    Authorizing Provider Encounter Provider   Valeriano Ellison MD Cozzi, Phillip J, MD     Medication Detail    Medication Quantity Refills Start End   rivaroxaban (XARELTO) 10 MG Oral Tab 90 tablet 3 5/29/2025 --   Sig:   Take 1 tablet (10 mg total) by mouth daily with food.     Route:   Oral     Order #:   [id number]       Additional Order Information    Multidose Package Dispensed: No Cost ID: -- Admin Qty: 10 mg    NDC #: -- NDC Qty: --    Calculation: --     Pharmacy Actions and Admin    EE Pharmacy Actions     MAR Comment Waste Waste Unit          Outpatient Medication Detail     Disp Refills Start End    rivaroxaban (XARELTO) 10 MG Oral Tab 90 tablet 3 5/29/2025 --    Sig - Route: Take 1 tablet (10 mg total) by mouth daily with food. - Oral    Sent to pharmacy as: Rivaroxaban 10 MG Oral Tablet (Xarelto)    E-Prescribing Status: Receipt confirmed by pharmacy (5/29/2025 11:51 AM CDT)      Pharmacy    [id number] - [address] AT [facility], [phone], [phone]

## 2025-06-17 RX ORDER — TIRZEPATIDE 5 MG/.5ML
5 INJECTION, SOLUTION SUBCUTANEOUS WEEKLY
Qty: 6 ML | Refills: 1 | Status: SHIPPED | OUTPATIENT
Start: 2025-06-17

## 2025-06-17 NOTE — TELEPHONE ENCOUNTER
Endocrine Refill protocol for oral and injectable diabetic medications    Protocol Criteria:  failed Reason: No Visit in required time frame mcm sent    If all below requirements are met, send a 90-day supply with 1 refill per provider protocol.    Verify appointment with Endocrinology completed in the last 6 months or scheduled in the next 3 months.  Verify A1C has been completed within the last 6 months and is below 8.5%     Last completed office visit: 12/11/2024 Berna Pierre APRN   Next scheduled Follow up: no future appt mcm sent     Last A1c result: Last A1c value was 5.6% done 12/11/2024.

## 2025-07-13 DIAGNOSIS — I82.4Y2 ACUTE DEEP VEIN THROMBOSIS (DVT) OF PROXIMAL VEIN OF LEFT LOWER EXTREMITY (HCC): ICD-10-CM

## 2025-07-16 RX ORDER — RIVAROXABAN 20 MG/1
20 TABLET, FILM COATED ORAL
Qty: 90 TABLET | Refills: 1 | OUTPATIENT
Start: 2025-07-16

## 2025-07-16 NOTE — TELEPHONE ENCOUNTER
ssessment and plan:  1.  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  Continue current management, medicine refilled and patient to see me in 1 year.    rivaroxaban (XARELTO) 10 MG Oral Tab 90 tablet 3 5/29/2025 --    Sig - Route: Take 1 tablet (10 mg total) by mouth daily with food. - Oral    Sent to pharmacy as: Rivaroxaban 10 MG Oral Tablet (Xarelto)    E-Prescribing Status: Receipt confirmed by pharmacy (5/29/2025 11:51 AM CDT)      Pharmacy    [id number] - [facility] [address] AT [facility], [phone], [phone]     rivaroxaban (XARELTO) 10 MG Oral Tab 90 tablet 3 5/29/2025 --    Sig - Route: Take 1 tablet (10 mg total) by mouth daily with food. - Oral    Sent to pharmacy as: Rivaroxaban 10 MG Oral Tablet (Xarelto)    E-Prescribing Status: Receipt confirmed by pharmacy (5/29/2025 11:51 AM CDT)      Pharmacy    [id number] - [facility] [address] [place] AT [facility], [phone], [phone]

## 2025-08-10 DIAGNOSIS — I82.4Y2 ACUTE DEEP VEIN THROMBOSIS (DVT) OF PROXIMAL VEIN OF LEFT LOWER EXTREMITY (HCC): ICD-10-CM

## 2025-08-11 RX ORDER — BLOOD SUGAR DIAGNOSTIC
STRIP MISCELLANEOUS DAILY
Qty: 100 STRIP | Refills: 1 | Status: SHIPPED | OUTPATIENT
Start: 2025-08-11

## 2025-08-13 RX ORDER — RIVAROXABAN 20 MG/1
20 TABLET, FILM COATED ORAL
Qty: 90 TABLET | Refills: 1 | OUTPATIENT
Start: 2025-08-13

## (undated) DIAGNOSIS — S09.90XD TRAUMATIC INJURY OF HEAD, SUBSEQUENT ENCOUNTER: ICD-10-CM

## (undated) DIAGNOSIS — R42 VERTIGO: ICD-10-CM

## (undated) DIAGNOSIS — S06.0X9A CONCUSSION WITH BRIEF LOC: Primary | ICD-10-CM

## (undated) DIAGNOSIS — G44.309 POST-TRAUMATIC HEADACHE, NOT INTRACTABLE, UNSPECIFIED CHRONICITY PATTERN: ICD-10-CM

## (undated) DEVICE — SNARE ENDOSCOPIC 10MM ROUND

## (undated) DEVICE — LINE MNTR ADLT SET O2 INTMD

## (undated) DEVICE — 60 ML SYRINGE REGULAR TIP: Brand: MONOJECT

## (undated) DEVICE — SNARE OPTMZ PLPCTM TRP

## (undated) DEVICE — 35 ML SYRINGE REGULAR TIP: Brand: MONOJECT

## (undated) DEVICE — Device

## (undated) DEVICE — KIT CLEAN ENDOKIT 1.1OZ GOWNX2

## (undated) DEVICE — MEDI-VAC NON-CONDUCTIVE SUCTION TUBING 6MM X 1.8M (6FT.) L: Brand: CARDINAL HEALTH

## (undated) DEVICE — Device: Brand: CUSTOM PROCEDURE KIT

## (undated) DEVICE — GIJAW SINGLE-USE BIOPSY FORCEPS WITH NEEDLE: Brand: GIJAW

## (undated) DEVICE — V2 SPECIMEN COLLECTION MANIFOLD KIT: Brand: NEPTUNE

## (undated) DEVICE — Device: Brand: DEFENDO AIR/WATER/SUCTION AND BIOPSY VALVE

## (undated) DEVICE — KIT ENDO ORCAPOD 160/180/190

## (undated) NOTE — LETTER
2022      [address] [facility] [address]         To whom it may concern the representative of the above named:    I am the primary care provider for Mr. Roland Mcguire.  He continues to be under

## (undated) NOTE — LETTER
22          Moreen Duane  :  1965      To Whom It May Concern: This patient was seen in our office on 22 . Work status: May return to work full-time, no restrictions    May return to work status per above effective 5/3/2022. If this office may be of further assistance, please do not hesitate to contact us.       Sincerely,          Izabela Reyes, DO

## (undated) NOTE — LETTER
2020      [address] [facility] [address]         To whom it may concern or supervisor for the above named:    I am the primary care physician for Mr. Mia Madden.   Due to symptoms that he was

## (undated) NOTE — MR AVS SNAPSHOT
After Visit Summary   1/6/2020    Paul Ramírez    MRN: [id number]           Visit Information     Date & Time  1/6/2020  4:00 PM Provider  Diony Flores. [address] [facility]  Phone  [phone] [employer] Activation Code: [email or web address]  Expires: 3/10/2020  2:20 PM    4. Enter your Zip Code and Date of Birth (mm/dd/yyyy) as indicated and click Next. You will be taken to the next sign-up page. 5. Create a [employer] Username.  This will be your [employer] log Communicate with a [facility] Physician or [facility] online. The physician will respond and provide   a treatment plan within a few hours.    ONLINE VISIT  Primary Care Providers  Treatment for mild illness or injury that does not require immediate attention  VIDEO VISI

## (undated) NOTE — LETTER
[facility]  Administration of Anesthesia  Niko MORRIS agree to be cared for by a physician anesthesiologist alone and/or with a nurse anesthetist, who is specially trained to monitor me and give me medicine to put me to sleep or keep me comfortable during my procedure    I understand that my anesthesiologist and/or anesthetist is not an employee or agent of [facility] or [employer]. He or she works for [facility]    As the patient asking for anesthesia services, I agree to:  Allow the anesthesiologist (anesthesia doctor) to give me medicine and do additional procedures as necessary. Some examples are: Starting or using an “IV” to give me medicine, fluids or blood during my procedure, and having a breathing tube placed to help me breathe when I’m asleep (intubation). In the event that my heart stops working properly, I understand that my anesthesiologist will make every effort to sustain my life, unless otherwise directed by [facility] Do Not Resuscitate documents.  Tell my anesthesia doctor before my procedure:  If I am pregnant.  The last time that I ate or drank.  iii. All of the medicines I take (including prescriptions, herbal supplements, and pills I can buy without a prescription (including street drugs/illegal medications). Failure to inform my anesthesiologist about these medicines may increase my risk of anesthetic complications.  iv.If I am allergic to anything or have had a reaction to anesthesia before.  I understand how the anesthesia medicine will help me (benefits).  I understand that with any type of anesthesia medicine there are risks:  The most common risks are: nausea, vomiting, sore throat, muscle soreness, damage to my eyes, mouth, or teeth (from breathing tube placement).  Rare risks include: remembering what happened during my procedure, allergic reactions to medications, injury to my airway, heart, lungs, vision, nerves, or  muscles and in extremely rare instances death.  My doctor has explained to me other choices available to me for my care (alternatives).  Pregnant Patients (“epidural”):  I understand that the risks of having an epidural (medicine given into my back to help control pain during labor), include itching, low blood pressure, difficulty urinating, headache or slowing of the baby’s heart. Very rare risks include infection, bleeding, seizure, irregular heart rhythms and nerve injury.  Regional Anesthesia (“spinal”, “epidural”, & “nerve blocks”):  I understand that rare but potential complications include headache, bleeding, infection, seizure, irregular heart rhythms, and nerve injury.    _____________________________________________________________________________  Patient (or Representative) Signature/Relationship to Patient  Date   Time    _____________________________________________________________________________   Name (if used)    Language/Organization   Time    _____________________________________________________________________________  Nurse Anesthetist Signature     Date   Time  _____________________________________________________________________________  Anesthesiologist Signature     Date   Time  I have discussed the procedure and information above with the patient (or patient’s representative) and answered their questions. The patient or their representative has agreed to have anesthesia services.    _____________________________________________________________________________  Witness        Date   Time  I have verified that the signature is that of the patient or patient’s representative, and that it was signed before the procedure  Patient Name: Niko Mast     : 1965                 Printed: 2025 at 2:55 PM    Medical Record #: [id number]                                            Page 1 of 1  ----------ANESTHESIA CONSENT----------

## (undated) NOTE — MR AVS SNAPSHOT
[address] [employer]  [phone]               Thank you for choosing us for your health care visit with Carlos Douglas PT.   We are glad to serve you and happy to provide you with Please arrive at your scheduled appointment time. Wear comfortable, loose fitting clothing.             Apr 18, 2017  7:45 AM   [facility] Visit By Therapist with [place]o [place], PTA   [address] [facility]

## (undated) NOTE — LETTER
1/5/2021              [address] [place] [id number]         To whom it may concern,      Rita Mcknight is a patient of our clinic. He was seen on 1/5/2021. He can return to work tomorrow, 1/6/2021.        Sincerely,

## (undated) NOTE — MR AVS SNAPSHOT
[address]  [employer]  [phone]               Thank you for choosing us for your health care visit with Tyrese Zamora PT.   We are glad to serve you and happy to provide you with

## (undated) NOTE — MR AVS SNAPSHOT
[facility]  [address], [address] [place] [address]  [phone]               Thank you for choosing us for your health care visit with Latrell Wood DO.   We are glad to serve you and happy to provide you with this sum [facility] however, your insurance company may require you to have these services done at another facility or to obtain an approved referral. Services ordered by your physician may not be covered unless prior authorization is obtained in ac recent med list.                aspirin 325 MG Tbec   Take 325 mg by mouth daily. Beta Sitosterol 40 % Powd   Take by mouth. Fexofenadine-Pseudoephed ER  MG Tb12   Take 1 tablet by mouth 2 (two) times daily.    Commonly known as:

## (undated) NOTE — MR AVS SNAPSHOT
[address] [facility] [address] [address]  [phone]  [phone]               Thank you for choosing us for your health care visit with Angela Gtz PTA.   We are glad to serve you and happy to provide you with Please arrive at your scheduled appointment time. Wear comfortable, loose fitting clothing.             Apr 21, 2017  6:45 AM   [place] Visit By Therapist with Alfredito Weaver, [address] [facility] [facility]

## (undated) NOTE — LETTER
1/3/2025        Niko Mast        [address] [facility] [address]        [facility] [address]         To whom it may concern as a pertains to the above-named patient:     I am the primary care provider for Mr. Niko Mast.  He is currently under my care for any and all acute and chronic medical conditions.  He was tentatively scheduled for left foot surgery on January 23, 2025.  His medical activity has been quite busy over the last 24 months and for now it is the patient's preference along with my recommendation that conservative treatment concerning his left foot will be adequate for now.    This patient is currently scheduled for gastrointestinal procedure on January 9, 2025 and will not be able to work until the following day which would be January 10, 2025.  He will be able to return without restriction on that given date.     If the patient's symptoms persist or worsen, then the surgical option remains just that.     If you have any general questions, please feel free to call.      Sincerely,    Dawson Means, DO  [address] [address]  Ph: [phone]  Fax: [phone]        Document electronically generated by:  Radha ARNOLD RN

## (undated) NOTE — LETTER
12/7/2021      REGARDING:        Niko DAVENPORT [address] [facility] [address]         To whom it may concern or the employer of Mr. Pittman Deanne:    I am the primary care provider for the above named.   He is under my care and r

## (undated) NOTE — LETTER
5/16/2019              [facility] [address]         To whom it may concern,      Landon Scott was seen today in clinic today for occupational exposure.         Sincerely,        DO Kamran Gleason [place] ROSHANI

## (undated) NOTE — LETTER
6/7/2019          To Whom It May Concern:    Paul Ramírez is currently under my medical care and may not return to work at this time. Please excuse Sergio Moss for 1 months beginning 05/16/2019. He may return to work on 06/12/2019.   Activity is restri
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
## (undated) NOTE — LETTER
22          Shyann Lunsford  :  1965      To Whom It May Concern: This patient was seen in our office on 22 . Work status:  Remain off work until re-evaluation at scheduled appointment on 3/8/2022        If this office may be of further assistance, please do not hesitate to contact us.       Sincerely,          Kate Bateman DO

## (undated) NOTE — LETTER
12/31/2024      REGARDING:        Niko aMst        [address] [facility] [address]        [facility] [address]         To whom it may concern as a pertains to the above-named patient:    I am the primary care provider for Mr. Niko Mast.  He is currently under my care for any and all acute and chronic medical conditions.  He was tentatively scheduled for left foot surgery on January 23, 2025.  His medical activity has been quite busy over the last 24 months and for now it is the patient's preference along with my recommendation that conservative treatment concerning his left foot will be adequate for now.    If the patient's symptoms persist or worsen, then the surgical option remains just that.    If you have any general questions, please feel free to call.    Sincerely,    Dawson Means, DO          Document electronically generated by:  Dawson Means, DO

## (undated) NOTE — MR AVS SNAPSHOT
[facility]  [address], [address]  [phone]               Thank you for choosing us for your health care visit with [facility], DO.   We are glad to serve you and happy to provide you with this sum Take 1 tablet by mouth 2 (two) times daily. Commonly known as:  ALLEGRA-D 12 HOUR           FISH OIL CONCENTRATE OR   Take  by mouth.           gabapentin 300 MG Caps   Take 1 capsule by mouth 3 (three) times daily.  After titration   Commonly known as: Don’t eat while when you’re bored.      EAT THESE FOODS MORE OFTEN: EAT THESE FOODS LESS OFTEN:   Make half your plate fruits and vegetables Highly refined, white starches including white bread, rice and pasta   Eat plenty of protein, keep the fat content l

## (undated) NOTE — LETTER
2020      [address] [facility] [address]         To whom it may concern or supervisor for the above named:    I am the primary care physician for  Paul Gerardo.   Due to symptoms that he was

## (undated) NOTE — LETTER
11/21/2019              [facility]         Dear Lizet Jacob,    I wanted to get back to you with your colonoscopy results. You had 2 colon polyps removed which were benign.   I would advise a repeat c

## (undated) NOTE — LETTER
2020      [address] [facility] [address]         To whom it may concern or the supervisor of the above named:    Renetta Viviana Almasandra is still under my care.   He is still temporarily totally disab

## (undated) NOTE — LETTER
[facility]  [address], [place], [place]    Authorization for Surgical Operation and Procedure                               I hereby authorize Johann Coelho MD, my physician and his/her assistants (if applicable), which may include medical students, residents, and/or fellows, to perform the following surgical operation/ procedure and administer such anesthesia as may be determined necessary by my physician: Operation/Procedure name (s) COLONOSCOPY on Niko Mast   2.   I recognize that during the surgical operation/procedure, unforeseen conditions may necessitate additional or different procedures than those listed above.  I, therefore, further authorize and request that the above-named surgeon, assistants, or designees perform such procedures as are, in their judgment, necessary and desirable.    3.   My surgeon/physician has discussed prior to my surgery the potential benefits, risks and side effects of this procedure; the likelihood of achieving goals; and potential problems that might occur during recuperation.  They also discussed reasonable alternatives to the procedure, including risks, benefits, and side effects related to the alternatives and risks related to not receiving this procedure.  I have had all my questions answered and I acknowledge that no guarantee has been made as to the result that may be obtained.    4.   Should the need arise during my operation/procedure, which includes change of level of care prior to discharge, I also consent to the administration of blood and/or blood products.  Further, I understand that despite careful testing and screening of blood or blood products by collecting agencies, I may still be subject to ill effects as a result of receiving a blood transfusion and/or blood products.  The following are some, but not all, of the potential risks that can occur: fever and allergic reactions, hemolytic reactions, transmission of diseases such as  Hepatitis, AIDS and Cytomegalovirus (CMV) and fluid overload.  In the event that I wish to have an autologous transfusion of my own blood, or a directed donor transfusion, I will discuss this with my physician.  Check only if Refusing Blood or Blood Products  I understand refusal of blood or blood products as deemed necessary by my physician may have serious consequences to my condition to include possible death. I hereby assume responsibility for my refusal and release the hospital, its personnel, and my physicians from any responsibility for the consequences of my refusal.    o  Refuse   5.   I authorize the use of any specimen, organs, tissues, body parts or foreign objects that may be removed from my body during the operation/procedure for diagnosis, research or teaching purposes and their subsequent disposal by hospital authorities.  I also authorize the release of specimen test results and/or written reports to my treating physician on the hospital medical staff or other referring or consulting physicians involved in my care, at the discretion of the Pathologist or my treating physician.    6.   I consent to the photographing or videotaping of the operations or procedures to be performed, including appropriate portions of my body for medical, scientific, or educational purposes, provided my identity is not revealed by the pictures or by descriptive texts accompanying them.  If the procedure has been photographed/videotaped, the surgeon will obtain the original picture, image, videotape or CD.  The hospital will not be responsible for storage, release or maintenance of the picture, image, tape or CD.    7.   I consent to the presence of a  or observers in the operating room as deemed necessary by my physician or their designees.    8.   I recognize that in the event my procedure results in extended X-Ray/fluoroscopy time, I may develop a skin reaction.    9. If I have a Do Not Attempt  Resuscitation (DNAR) order in place, that status will be suspended while in the operating room, procedural suite, and during the recovery period unless otherwise explicitly stated by me (or a person authorized to consent on my behalf). The surgeon or my attending physician will determine when the applicable recovery period ends for purposes of reinstating the DNAR order.  10. Patients having a sterilization procedure: I understand that if the procedure is successful the results will be permanent and it will therefore be impossible for me to inseminate, conceive, or bear children.  I also understand that the procedure is intended to result in sterility, although the result has not been guaranteed.   11. I acknowledge that my physician has explained sedation/analgesia administration to me including the risk and benefits I consent to the administration of sedation/analgesia as may be necessary or desirable in the judgment of my physician.    I CERTIFY THAT I HAVE READ AND FULLY UNDERSTAND THE ABOVE CONSENT TO OPERATION and/or OTHER PROCEDURE.     ____________________________________  _________________________________        ______________________________  Signature of Patient    Signature of Responsible Person                Printed Name of Responsible Person                                      ____________________________________  _____________________________                ________________________________  Signature of Witness        Date  Time         Relationship to Patient    STATEMENT OF PHYSICIAN My signature below affirms that prior to the time of the procedure; I have explained to the patient and/or his/her legal representative, the risks and benefits involved in the proposed treatment and any reasonable alternative to the proposed treatment. I have also explained the risks and benefits involved in refusal of the proposed treatment and alternatives to the proposed treatment and have answered the patient's  questions. If I have a significant financial interest in a co-management agreement or a significant financial interest in any product or implant, or other significant relationship used in this procedure/surgery, I have disclosed this and had a discussion with my patient.     _____________________________________________________              _____________________________  (Signature of Physician)                                                                                         (Date)                                   (Time)  Patient Name: Niko Mast      : 1965      Printed: 2025     Medical Record #: [id number]                                      Page 1 of 1

## (undated) NOTE — LETTER
2020      [address] [facility] [address]         To whom it may concern or the supervisor of the above named:    Please be advised that MrRenetta Winters has been under my care for several weeks. He is now found to be in good health and can return to all of his expected duties without restriction on May 6, 2020. If you have any specific concerns or questions please call. Sincerely,    Ness Hampton, DO [facility] , [address], [address] [place] [address]  [phone]        Document electronically generated by:   Ness Hampton

## (undated) NOTE — LETTER
12/31/2024      REGARDING:        Niko Mast        [address] [facility] [address]        [facility] [address]       To whom it may concern as a pertains to the above-named patient:     I am the primary care provider for Mr. Niko Mast.  He is currently under my care for any and all acute and chronic medical conditions.  He was tentatively scheduled for left foot surgery on January 23, 2025.  His medical activity has been quite busy over the last 24 months and for now it is the patient's preference along with my recommendation that conservative treatment concerning his left foot will be adequate for now.    This patient is currently scheduled for gastrointestinal procedure on January 9, 2024 and will not be able to work until the following day which would be January 10, 2024.  He will be able to return without restriction on that given date.     If the patient's symptoms persist or worsen, then the surgical option remains just that.     If you have any general questions, please feel free to call.    Sincerely,    Dawson Means, DO          Document electronically generated by:  Dawson Means, DO

## (undated) NOTE — LETTER
3/28/2019    [facility]            Dear Hunter Doan,      Our records indicate that you are due for an appointment for a Colonoscopy in April 2019, or shortly there after, with Chan Stark MD.